# Patient Record
Sex: MALE | Race: WHITE | Employment: OTHER | URBAN - METROPOLITAN AREA
[De-identification: names, ages, dates, MRNs, and addresses within clinical notes are randomized per-mention and may not be internally consistent; named-entity substitution may affect disease eponyms.]

---

## 2017-01-03 RX ORDER — ATORVASTATIN CALCIUM 40 MG/1
TABLET, FILM COATED ORAL
Qty: 30 TABLET | Refills: 5 | Status: SHIPPED | OUTPATIENT
Start: 2017-01-03 | End: 2017-07-23 | Stop reason: SDUPTHER

## 2017-02-01 RX ORDER — CYCLOBENZAPRINE HCL 10 MG
TABLET ORAL
Qty: 30 TABLET | Refills: 0 | Status: SHIPPED | OUTPATIENT
Start: 2017-02-01 | End: 2017-03-22 | Stop reason: SDUPTHER

## 2017-02-06 ENCOUNTER — HOSPITAL ENCOUNTER (OUTPATIENT)
Dept: OTHER | Age: 62
Discharge: HOME | End: 2017-02-06
Attending: CHIROPRACTOR | Admitting: CHIROPRACTOR

## 2017-02-06 DIAGNOSIS — R51.9 GENERALIZED HEADACHES: ICD-10-CM

## 2017-03-18 ASSESSMENT — ENCOUNTER SYMPTOMS
SHORTNESS OF BREATH: 0
ABDOMINAL PAIN: 0

## 2017-03-22 ENCOUNTER — OFFICE VISIT (OUTPATIENT)
Dept: INTERNAL MEDICINE CLINIC | Age: 62
End: 2017-03-22

## 2017-03-22 VITALS
HEIGHT: 70 IN | BODY MASS INDEX: 23.62 KG/M2 | RESPIRATION RATE: 16 BRPM | SYSTOLIC BLOOD PRESSURE: 132 MMHG | DIASTOLIC BLOOD PRESSURE: 80 MMHG | WEIGHT: 165 LBS | HEART RATE: 64 BPM

## 2017-03-22 DIAGNOSIS — E78.00 PURE HYPERCHOLESTEROLEMIA: Primary | ICD-10-CM

## 2017-03-22 DIAGNOSIS — M54.16 LUMBAR RADICULOPATHY: ICD-10-CM

## 2017-03-22 DIAGNOSIS — K57.30 DIVERTICULOSIS OF LARGE INTESTINE WITHOUT HEMORRHAGE: ICD-10-CM

## 2017-03-22 PROCEDURE — 99213 OFFICE O/P EST LOW 20 MIN: CPT | Performed by: INTERNAL MEDICINE

## 2017-03-22 RX ORDER — CYCLOBENZAPRINE HCL 10 MG
TABLET ORAL
Qty: 30 TABLET | Refills: 3 | Status: SHIPPED | OUTPATIENT
Start: 2017-03-22 | End: 2017-07-23 | Stop reason: SDUPTHER

## 2017-03-22 RX ORDER — RIZATRIPTAN BENZOATE 10 MG/1
TABLET ORAL
Qty: 30 TABLET | Refills: 3 | Status: SHIPPED | OUTPATIENT
Start: 2017-03-22 | End: 2017-11-29 | Stop reason: SDUPTHER

## 2017-03-28 ENCOUNTER — OFFICE VISIT (OUTPATIENT)
Dept: INTERNAL MEDICINE CLINIC | Age: 62
End: 2017-03-28

## 2017-03-28 VITALS
HEART RATE: 96 BPM | HEIGHT: 70 IN | DIASTOLIC BLOOD PRESSURE: 78 MMHG | RESPIRATION RATE: 16 BRPM | WEIGHT: 167.2 LBS | BODY MASS INDEX: 23.94 KG/M2 | SYSTOLIC BLOOD PRESSURE: 138 MMHG

## 2017-03-28 DIAGNOSIS — H61.21 IMPACTED CERUMEN OF RIGHT EAR: Primary | ICD-10-CM

## 2017-03-28 PROCEDURE — 69210 REMOVE IMPACTED EAR WAX UNI: CPT | Performed by: INTERNAL MEDICINE

## 2017-03-28 PROCEDURE — 99999 PR OFFICE/OUTPT VISIT,PROCEDURE ONLY: CPT | Performed by: INTERNAL MEDICINE

## 2017-06-08 DIAGNOSIS — Z13.9 SCREENING: Primary | ICD-10-CM

## 2017-06-08 LAB
A/G RATIO: 1.8 (ref 1.1–2.2)
ALBUMIN SERPL-MCNC: 4.3 G/DL (ref 3.4–5)
ALP BLD-CCNC: 80 U/L (ref 40–129)
ALT SERPL-CCNC: 31 U/L (ref 10–40)
ANION GAP SERPL CALCULATED.3IONS-SCNC: 13 MMOL/L (ref 3–16)
AST SERPL-CCNC: 30 U/L (ref 15–37)
BILIRUB SERPL-MCNC: <0.2 MG/DL (ref 0–1)
BUN BLDV-MCNC: 25 MG/DL (ref 7–20)
CALCIUM SERPL-MCNC: 9.8 MG/DL (ref 8.3–10.6)
CHLORIDE BLD-SCNC: 104 MMOL/L (ref 99–110)
CHOLESTEROL, TOTAL: 147 MG/DL (ref 0–199)
CO2: 28 MMOL/L (ref 21–32)
CREAT SERPL-MCNC: 1.2 MG/DL (ref 0.8–1.3)
GFR AFRICAN AMERICAN: >60
GFR NON-AFRICAN AMERICAN: >60
GLOBULIN: 2.4 G/DL
GLUCOSE BLD-MCNC: 90 MG/DL (ref 70–99)
HDLC SERPL-MCNC: 53 MG/DL (ref 40–60)
LDL CHOLESTEROL CALCULATED: 75 MG/DL
POTASSIUM SERPL-SCNC: 4.9 MMOL/L (ref 3.5–5.1)
PROSTATE SPECIFIC ANTIGEN: 1.84 NG/ML (ref 0–4)
SODIUM BLD-SCNC: 145 MMOL/L (ref 136–145)
TOTAL PROTEIN: 6.7 G/DL (ref 6.4–8.2)
TRIGL SERPL-MCNC: 94 MG/DL (ref 0–150)
VLDLC SERPL CALC-MCNC: 19 MG/DL

## 2017-06-12 ASSESSMENT — ENCOUNTER SYMPTOMS
SHORTNESS OF BREATH: 0
ABDOMINAL PAIN: 0

## 2017-06-14 ENCOUNTER — OFFICE VISIT (OUTPATIENT)
Dept: INTERNAL MEDICINE CLINIC | Age: 62
End: 2017-06-14

## 2017-06-14 VITALS
DIASTOLIC BLOOD PRESSURE: 80 MMHG | WEIGHT: 162 LBS | HEIGHT: 70 IN | BODY MASS INDEX: 23.19 KG/M2 | RESPIRATION RATE: 16 BRPM | HEART RATE: 72 BPM | SYSTOLIC BLOOD PRESSURE: 130 MMHG

## 2017-06-14 DIAGNOSIS — Z00.00 PREVENTATIVE HEALTH CARE: Primary | ICD-10-CM

## 2017-06-14 DIAGNOSIS — E78.00 PURE HYPERCHOLESTEROLEMIA: ICD-10-CM

## 2017-06-14 DIAGNOSIS — K57.30 DIVERTICULOSIS OF LARGE INTESTINE WITHOUT HEMORRHAGE: ICD-10-CM

## 2017-06-14 DIAGNOSIS — M54.16 LUMBAR RADICULOPATHY: ICD-10-CM

## 2017-06-14 PROCEDURE — 99396 PREV VISIT EST AGE 40-64: CPT | Performed by: INTERNAL MEDICINE

## 2017-06-14 RX ORDER — IBUPROFEN 800 MG/1
TABLET ORAL
Qty: 90 TABLET | Refills: 3 | Status: SHIPPED | OUTPATIENT
Start: 2017-06-14 | End: 2018-07-24 | Stop reason: SDUPTHER

## 2017-06-14 RX ORDER — EZETIMIBE 10 MG/1
10 TABLET ORAL DAILY
COMMUNITY
End: 2017-06-14 | Stop reason: SDUPTHER

## 2017-06-14 RX ORDER — EZETIMIBE 10 MG/1
10 TABLET ORAL DAILY
Qty: 90 TABLET | Refills: 3 | Status: SHIPPED | OUTPATIENT
Start: 2017-06-14 | End: 2018-07-09 | Stop reason: SDUPTHER

## 2017-07-19 ENCOUNTER — OFFICE VISIT (OUTPATIENT)
Dept: INTERNAL MEDICINE CLINIC | Age: 62
End: 2017-07-19

## 2017-07-19 VITALS
WEIGHT: 161 LBS | SYSTOLIC BLOOD PRESSURE: 146 MMHG | HEIGHT: 70 IN | DIASTOLIC BLOOD PRESSURE: 92 MMHG | HEART RATE: 80 BPM | BODY MASS INDEX: 23.05 KG/M2 | RESPIRATION RATE: 16 BRPM

## 2017-07-19 DIAGNOSIS — M54.16 LUMBAR RADICULOPATHY: ICD-10-CM

## 2017-07-19 DIAGNOSIS — M25.569 ACUTE KNEE PAIN, UNSPECIFIED LATERALITY: ICD-10-CM

## 2017-07-19 DIAGNOSIS — E78.00 PURE HYPERCHOLESTEROLEMIA: Primary | ICD-10-CM

## 2017-07-19 PROCEDURE — 99213 OFFICE O/P EST LOW 20 MIN: CPT | Performed by: INTERNAL MEDICINE

## 2017-07-19 RX ORDER — PREDNISONE 10 MG/1
TABLET ORAL
Qty: 28 TABLET | Refills: 0 | Status: SHIPPED | OUTPATIENT
Start: 2017-07-19 | End: 2018-01-30 | Stop reason: SDUPTHER

## 2017-07-19 ASSESSMENT — ENCOUNTER SYMPTOMS
SHORTNESS OF BREATH: 0
ABDOMINAL PAIN: 0

## 2017-07-20 ENCOUNTER — HOSPITAL ENCOUNTER (OUTPATIENT)
Dept: OTHER | Age: 62
Discharge: HOME | End: 2017-07-20
Attending: INTERNAL MEDICINE | Admitting: INTERNAL MEDICINE

## 2017-07-20 DIAGNOSIS — M25.569 ACUTE KNEE PAIN, UNSPECIFIED LATERALITY: ICD-10-CM

## 2017-07-24 RX ORDER — ATORVASTATIN CALCIUM 40 MG/1
TABLET, FILM COATED ORAL
Qty: 30 TABLET | Refills: 0 | Status: SHIPPED | OUTPATIENT
Start: 2017-07-24 | End: 2017-08-24 | Stop reason: SDUPTHER

## 2017-07-24 RX ORDER — CYCLOBENZAPRINE HCL 10 MG
TABLET ORAL
Qty: 30 TABLET | Refills: 0 | Status: SHIPPED | OUTPATIENT
Start: 2017-07-24 | End: 2017-08-24 | Stop reason: SDUPTHER

## 2017-08-25 RX ORDER — CYCLOBENZAPRINE HCL 10 MG
TABLET ORAL
Qty: 30 TABLET | Refills: 0 | Status: SHIPPED | OUTPATIENT
Start: 2017-08-25 | End: 2017-09-16 | Stop reason: SDUPTHER

## 2017-08-25 RX ORDER — ATORVASTATIN CALCIUM 40 MG/1
TABLET, FILM COATED ORAL
Qty: 30 TABLET | Refills: 0 | Status: SHIPPED | OUTPATIENT
Start: 2017-08-25 | End: 2017-09-16 | Stop reason: SDUPTHER

## 2017-09-18 RX ORDER — CYCLOBENZAPRINE HCL 10 MG
TABLET ORAL
Qty: 30 TABLET | Refills: 0 | Status: SHIPPED | OUTPATIENT
Start: 2017-09-18 | End: 2017-10-26 | Stop reason: SDUPTHER

## 2017-09-18 RX ORDER — ATORVASTATIN CALCIUM 40 MG/1
TABLET, FILM COATED ORAL
Qty: 30 TABLET | Refills: 0 | Status: SHIPPED | OUTPATIENT
Start: 2017-09-18 | End: 2017-10-26 | Stop reason: SDUPTHER

## 2017-10-27 RX ORDER — ATORVASTATIN CALCIUM 40 MG/1
TABLET, FILM COATED ORAL
Qty: 30 TABLET | Refills: 0 | Status: SHIPPED | OUTPATIENT
Start: 2017-10-27 | End: 2017-11-21 | Stop reason: SDUPTHER

## 2017-10-27 RX ORDER — CYCLOBENZAPRINE HCL 10 MG
TABLET ORAL
Qty: 30 TABLET | Refills: 0 | Status: SHIPPED | OUTPATIENT
Start: 2017-10-27 | End: 2017-11-29 | Stop reason: SDUPTHER

## 2017-11-21 DIAGNOSIS — Z00.00 PREVENTATIVE HEALTH CARE: ICD-10-CM

## 2017-11-21 DIAGNOSIS — E78.00 PURE HYPERCHOLESTEROLEMIA: ICD-10-CM

## 2017-11-21 LAB
A/G RATIO: 2 (ref 1.1–2.2)
ALBUMIN SERPL-MCNC: 4.2 G/DL (ref 3.4–5)
ALP BLD-CCNC: 86 U/L (ref 40–129)
ALT SERPL-CCNC: 39 U/L (ref 10–40)
ANION GAP SERPL CALCULATED.3IONS-SCNC: 13 MMOL/L (ref 3–16)
AST SERPL-CCNC: 42 U/L (ref 15–37)
BILIRUB SERPL-MCNC: <0.2 MG/DL (ref 0–1)
BUN BLDV-MCNC: 22 MG/DL (ref 7–20)
CALCIUM SERPL-MCNC: 9.8 MG/DL (ref 8.3–10.6)
CHLORIDE BLD-SCNC: 102 MMOL/L (ref 99–110)
CHOLESTEROL, TOTAL: 147 MG/DL (ref 0–199)
CO2: 26 MMOL/L (ref 21–32)
CREAT SERPL-MCNC: 1.2 MG/DL (ref 0.8–1.3)
GFR AFRICAN AMERICAN: >60
GFR NON-AFRICAN AMERICAN: >60
GLOBULIN: 2.1 G/DL
GLUCOSE BLD-MCNC: 91 MG/DL (ref 70–99)
HDLC SERPL-MCNC: 70 MG/DL (ref 40–60)
HEPATITIS C ANTIBODY INTERPRETATION: NORMAL
LDL CHOLESTEROL CALCULATED: 65 MG/DL
POTASSIUM SERPL-SCNC: 4.4 MMOL/L (ref 3.5–5.1)
SODIUM BLD-SCNC: 141 MMOL/L (ref 136–145)
TOTAL PROTEIN: 6.3 G/DL (ref 6.4–8.2)
TRIGL SERPL-MCNC: 61 MG/DL (ref 0–150)
VLDLC SERPL CALC-MCNC: 12 MG/DL

## 2017-11-21 RX ORDER — ATORVASTATIN CALCIUM 40 MG/1
TABLET, FILM COATED ORAL
Qty: 30 TABLET | Refills: 0 | Status: SHIPPED | OUTPATIENT
Start: 2017-11-21 | End: 2017-12-26 | Stop reason: SDUPTHER

## 2017-11-22 LAB — HIV-1 AND HIV-2 ANTIBODIES: NORMAL

## 2017-11-27 ASSESSMENT — ENCOUNTER SYMPTOMS
SHORTNESS OF BREATH: 0
ABDOMINAL PAIN: 0

## 2017-11-29 ENCOUNTER — OFFICE VISIT (OUTPATIENT)
Dept: INTERNAL MEDICINE CLINIC | Age: 62
End: 2017-11-29

## 2017-11-29 VITALS
RESPIRATION RATE: 16 BRPM | WEIGHT: 164 LBS | BODY MASS INDEX: 23.48 KG/M2 | HEIGHT: 70 IN | SYSTOLIC BLOOD PRESSURE: 132 MMHG | HEART RATE: 80 BPM | DIASTOLIC BLOOD PRESSURE: 84 MMHG

## 2017-11-29 DIAGNOSIS — G44.221 CHRONIC TENSION-TYPE HEADACHE, INTRACTABLE: ICD-10-CM

## 2017-11-29 DIAGNOSIS — E78.00 PURE HYPERCHOLESTEROLEMIA: Primary | ICD-10-CM

## 2017-11-29 DIAGNOSIS — N40.0 BENIGN PROSTATIC HYPERPLASIA WITHOUT LOWER URINARY TRACT SYMPTOMS: ICD-10-CM

## 2017-11-29 DIAGNOSIS — Z23 NEED FOR INFLUENZA VACCINATION: ICD-10-CM

## 2017-11-29 DIAGNOSIS — M50.20 HERNIATED CERVICAL DISC: ICD-10-CM

## 2017-11-29 PROCEDURE — 90686 IIV4 VACC NO PRSV 0.5 ML IM: CPT | Performed by: INTERNAL MEDICINE

## 2017-11-29 PROCEDURE — 90471 IMMUNIZATION ADMIN: CPT | Performed by: INTERNAL MEDICINE

## 2017-11-29 PROCEDURE — G8484 FLU IMMUNIZE NO ADMIN: HCPCS | Performed by: INTERNAL MEDICINE

## 2017-11-29 PROCEDURE — 1036F TOBACCO NON-USER: CPT | Performed by: INTERNAL MEDICINE

## 2017-11-29 PROCEDURE — 99214 OFFICE O/P EST MOD 30 MIN: CPT | Performed by: INTERNAL MEDICINE

## 2017-11-29 PROCEDURE — G8420 CALC BMI NORM PARAMETERS: HCPCS | Performed by: INTERNAL MEDICINE

## 2017-11-29 PROCEDURE — G8428 CUR MEDS NOT DOCUMENT: HCPCS | Performed by: INTERNAL MEDICINE

## 2017-11-29 PROCEDURE — 3017F COLORECTAL CA SCREEN DOC REV: CPT | Performed by: INTERNAL MEDICINE

## 2017-11-29 RX ORDER — CYCLOBENZAPRINE HCL 10 MG
TABLET ORAL
Qty: 30 TABLET | Refills: 5 | Status: SHIPPED | OUTPATIENT
Start: 2017-11-29 | End: 2018-06-10 | Stop reason: SDUPTHER

## 2017-11-29 RX ORDER — RIZATRIPTAN BENZOATE 10 MG/1
TABLET ORAL
Qty: 30 TABLET | Refills: 5 | Status: SHIPPED | OUTPATIENT
Start: 2017-11-29 | End: 2018-02-26

## 2017-12-26 RX ORDER — ATORVASTATIN CALCIUM 40 MG/1
TABLET, FILM COATED ORAL
Qty: 30 TABLET | Refills: 5 | Status: SHIPPED | OUTPATIENT
Start: 2017-12-26 | End: 2018-07-09 | Stop reason: SDUPTHER

## 2018-01-29 PROBLEM — M79.645 THUMB PAIN, LEFT: Status: ACTIVE | Noted: 2018-01-29

## 2018-01-29 PROBLEM — M25.561 ACUTE PAIN OF RIGHT KNEE: Status: ACTIVE | Noted: 2018-01-29

## 2018-01-29 ASSESSMENT — ENCOUNTER SYMPTOMS
ABDOMINAL PAIN: 0
SHORTNESS OF BREATH: 0

## 2018-01-29 NOTE — PROGRESS NOTES
Subjective:      Patient ID: Maynor Astorga is a 65 y.o. male. HPI  1. Pure hypercholesterolemia --Stable--no new issues----lab noted and reviewed      2. Acute pain of right knee --incr sx --pain--last rx pred--did help-- 07/2017--[facility] sx--no trauma     3. Thumb pain, left --base of thumb --ligament full and sl fluid --cystic         Review of Systems   Respiratory: Negative for shortness of breath. Cardiovascular: Negative for chest pain. Gastrointestinal: Negative for abdominal pain. Objective:   Physical Exam   Cardiovascular: Normal rate. Pulmonary/Chest: Effort normal.   Abdominal: Soft. Musculoskeletal:   Rt knee--med pain at jt line--sl decr flex   No effusion   Lt thumb--palp cyst  at pip jt--sl tender        Assessment:      1. Pure hypercholesterolemia --Continue current therapy      2. Acute pain of right knee --pred tpaer--see ortho if no better     3.  Thumb pain, left --pred tpaer---hand orto for asp if no better             Plan:

## 2018-01-30 ENCOUNTER — OFFICE VISIT (OUTPATIENT)
Dept: INTERNAL MEDICINE CLINIC | Age: 63
End: 2018-01-30

## 2018-01-30 VITALS
SYSTOLIC BLOOD PRESSURE: 138 MMHG | HEART RATE: 72 BPM | HEIGHT: 70 IN | WEIGHT: 171 LBS | DIASTOLIC BLOOD PRESSURE: 82 MMHG | BODY MASS INDEX: 24.48 KG/M2 | RESPIRATION RATE: 16 BRPM

## 2018-01-30 DIAGNOSIS — M25.561 ACUTE PAIN OF RIGHT KNEE: ICD-10-CM

## 2018-01-30 DIAGNOSIS — M79.645 THUMB PAIN, LEFT: ICD-10-CM

## 2018-01-30 DIAGNOSIS — E78.00 PURE HYPERCHOLESTEROLEMIA: Primary | ICD-10-CM

## 2018-01-30 PROCEDURE — 99213 OFFICE O/P EST LOW 20 MIN: CPT | Performed by: INTERNAL MEDICINE

## 2018-01-30 PROCEDURE — G8484 FLU IMMUNIZE NO ADMIN: HCPCS | Performed by: INTERNAL MEDICINE

## 2018-01-30 PROCEDURE — 1036F TOBACCO NON-USER: CPT | Performed by: INTERNAL MEDICINE

## 2018-01-30 PROCEDURE — G8427 DOCREV CUR MEDS BY ELIG CLIN: HCPCS | Performed by: INTERNAL MEDICINE

## 2018-01-30 PROCEDURE — G8420 CALC BMI NORM PARAMETERS: HCPCS | Performed by: INTERNAL MEDICINE

## 2018-01-30 PROCEDURE — 3017F COLORECTAL CA SCREEN DOC REV: CPT | Performed by: INTERNAL MEDICINE

## 2018-01-30 RX ORDER — PREDNISONE 10 MG/1
TABLET ORAL
Qty: 28 TABLET | Refills: 0 | Status: SHIPPED | OUTPATIENT
Start: 2018-01-30 | End: 2018-09-05 | Stop reason: ALTCHOICE

## 2018-02-26 RX ORDER — RIZATRIPTAN BENZOATE 10 MG/1
TABLET ORAL
Qty: 30 TABLET | Refills: 0 | Status: SHIPPED | OUTPATIENT
Start: 2018-02-26 | End: 2018-05-17 | Stop reason: SDUPTHER

## 2018-05-17 RX ORDER — RIZATRIPTAN BENZOATE 10 MG/1
TABLET ORAL
Qty: 30 TABLET | Refills: 0 | Status: SHIPPED | OUTPATIENT
Start: 2018-05-17 | End: 2018-07-24 | Stop reason: SDUPTHER

## 2018-05-21 DIAGNOSIS — E78.00 PURE HYPERCHOLESTEROLEMIA: ICD-10-CM

## 2018-05-21 DIAGNOSIS — N40.0 BENIGN PROSTATIC HYPERPLASIA WITHOUT LOWER URINARY TRACT SYMPTOMS: ICD-10-CM

## 2018-05-21 LAB
A/G RATIO: 2.3 (ref 1.1–2.2)
ALBUMIN SERPL-MCNC: 4.3 G/DL (ref 3.4–5)
ALP BLD-CCNC: 84 U/L (ref 40–129)
ALT SERPL-CCNC: 23 U/L (ref 10–40)
ANION GAP SERPL CALCULATED.3IONS-SCNC: 16 MMOL/L (ref 3–16)
AST SERPL-CCNC: 35 U/L (ref 15–37)
BILIRUB SERPL-MCNC: 0.4 MG/DL (ref 0–1)
BUN BLDV-MCNC: 30 MG/DL (ref 7–20)
CALCIUM SERPL-MCNC: 9.7 MG/DL (ref 8.3–10.6)
CHLORIDE BLD-SCNC: 102 MMOL/L (ref 99–110)
CHOLESTEROL, TOTAL: 153 MG/DL (ref 0–199)
CO2: 23 MMOL/L (ref 21–32)
CREAT SERPL-MCNC: 1.5 MG/DL (ref 0.8–1.3)
GFR AFRICAN AMERICAN: 57
GFR NON-AFRICAN AMERICAN: 47
GLOBULIN: 1.9 G/DL
GLUCOSE BLD-MCNC: 102 MG/DL (ref 70–99)
HDLC SERPL-MCNC: 57 MG/DL (ref 40–60)
LDL CHOLESTEROL CALCULATED: 82 MG/DL
POTASSIUM SERPL-SCNC: 4.2 MMOL/L (ref 3.5–5.1)
PROSTATE SPECIFIC ANTIGEN: 3.19 NG/ML (ref 0–4)
SODIUM BLD-SCNC: 141 MMOL/L (ref 136–145)
TOTAL PROTEIN: 6.2 G/DL (ref 6.4–8.2)
TRIGL SERPL-MCNC: 70 MG/DL (ref 0–150)
VLDLC SERPL CALC-MCNC: 14 MG/DL

## 2018-06-11 RX ORDER — CYCLOBENZAPRINE HCL 10 MG
TABLET ORAL
Qty: 30 TABLET | Refills: 0 | Status: SHIPPED | OUTPATIENT
Start: 2018-06-11 | End: 2018-07-09 | Stop reason: SDUPTHER

## 2018-06-12 ASSESSMENT — ENCOUNTER SYMPTOMS
SHORTNESS OF BREATH: 0
ABDOMINAL PAIN: 0

## 2018-06-15 ENCOUNTER — OFFICE VISIT (OUTPATIENT)
Dept: INTERNAL MEDICINE CLINIC | Age: 63
End: 2018-06-15

## 2018-06-15 VITALS
DIASTOLIC BLOOD PRESSURE: 78 MMHG | RESPIRATION RATE: 16 BRPM | HEART RATE: 84 BPM | BODY MASS INDEX: 23.96 KG/M2 | WEIGHT: 167 LBS | SYSTOLIC BLOOD PRESSURE: 122 MMHG | OXYGEN SATURATION: 97 %

## 2018-06-15 DIAGNOSIS — M54.16 LUMBAR RADICULOPATHY: ICD-10-CM

## 2018-06-15 DIAGNOSIS — E78.00 PURE HYPERCHOLESTEROLEMIA: Primary | ICD-10-CM

## 2018-06-15 DIAGNOSIS — N28.9 RENAL INSUFFICIENCY: ICD-10-CM

## 2018-06-15 DIAGNOSIS — M54.12 CERVICAL RADICULOPATHY: ICD-10-CM

## 2018-06-15 LAB
ANION GAP SERPL CALCULATED.3IONS-SCNC: 14 MMOL/L (ref 3–16)
BUN BLDV-MCNC: 25 MG/DL (ref 7–20)
CALCIUM SERPL-MCNC: 10.2 MG/DL (ref 8.3–10.6)
CHLORIDE BLD-SCNC: 103 MMOL/L (ref 99–110)
CO2: 26 MMOL/L (ref 21–32)
CREAT SERPL-MCNC: 1.2 MG/DL (ref 0.8–1.3)
GFR AFRICAN AMERICAN: >60
GFR NON-AFRICAN AMERICAN: >60
GLUCOSE BLD-MCNC: 93 MG/DL (ref 70–99)
POTASSIUM SERPL-SCNC: 5 MMOL/L (ref 3.5–5.1)
SODIUM BLD-SCNC: 143 MMOL/L (ref 136–145)

## 2018-06-15 PROCEDURE — 99396 PREV VISIT EST AGE 40-64: CPT | Performed by: INTERNAL MEDICINE

## 2018-07-09 RX ORDER — EZETIMIBE 10 MG/1
10 TABLET ORAL DAILY
Qty: 90 TABLET | Refills: 0 | Status: SHIPPED | OUTPATIENT
Start: 2018-07-09 | End: 2018-10-22

## 2018-07-09 RX ORDER — CYCLOBENZAPRINE HCL 10 MG
TABLET ORAL
Qty: 30 TABLET | Refills: 0 | Status: SHIPPED | OUTPATIENT
Start: 2018-07-09 | End: 2018-08-11 | Stop reason: SDUPTHER

## 2018-07-09 RX ORDER — ATORVASTATIN CALCIUM 40 MG/1
TABLET, FILM COATED ORAL
Qty: 30 TABLET | Refills: 0 | Status: SHIPPED | OUTPATIENT
Start: 2018-07-09 | End: 2018-08-11 | Stop reason: SDUPTHER

## 2018-07-25 RX ORDER — IBUPROFEN 800 MG/1
TABLET ORAL
Qty: 90 TABLET | Refills: 0 | Status: SHIPPED | OUTPATIENT
Start: 2018-07-25 | End: 2018-12-03 | Stop reason: SDUPTHER

## 2018-07-25 RX ORDER — RIZATRIPTAN BENZOATE 10 MG/1
TABLET ORAL
Qty: 30 TABLET | Refills: 0 | Status: SHIPPED | OUTPATIENT
Start: 2018-07-25 | End: 2018-09-21 | Stop reason: SDUPTHER

## 2018-08-13 RX ORDER — ATORVASTATIN CALCIUM 40 MG/1
TABLET, FILM COATED ORAL
Qty: 30 TABLET | Refills: 0 | Status: SHIPPED | OUTPATIENT
Start: 2018-08-13 | End: 2018-09-14 | Stop reason: SDUPTHER

## 2018-08-13 RX ORDER — CYCLOBENZAPRINE HCL 10 MG
TABLET ORAL
Qty: 30 TABLET | Refills: 0 | Status: SHIPPED | OUTPATIENT
Start: 2018-08-13 | End: 2018-09-14 | Stop reason: SDUPTHER

## 2018-09-05 ENCOUNTER — OFFICE VISIT (OUTPATIENT)
Dept: DERMATOLOGY | Age: 63
End: 2018-09-05

## 2018-09-05 DIAGNOSIS — L82.1 SEBORRHEIC KERATOSES: ICD-10-CM

## 2018-09-05 DIAGNOSIS — D48.5 NEOPLASM OF UNCERTAIN BEHAVIOR OF SKIN: Primary | ICD-10-CM

## 2018-09-05 PROCEDURE — 11100 PR BIOPSY OF SKIN LESION: CPT | Performed by: DERMATOLOGY

## 2018-09-05 PROCEDURE — 1036F TOBACCO NON-USER: CPT | Performed by: DERMATOLOGY

## 2018-09-05 PROCEDURE — G8427 DOCREV CUR MEDS BY ELIG CLIN: HCPCS | Performed by: DERMATOLOGY

## 2018-09-05 PROCEDURE — G8420 CALC BMI NORM PARAMETERS: HCPCS | Performed by: DERMATOLOGY

## 2018-09-05 PROCEDURE — 99202 OFFICE O/P NEW SF 15 MIN: CPT | Performed by: DERMATOLOGY

## 2018-09-05 PROCEDURE — 3017F COLORECTAL CA SCREEN DOC REV: CPT | Performed by: DERMATOLOGY

## 2018-09-05 NOTE — PROGRESS NOTES
Addendum: Excoriated pink papule left buttock present for months. Has had similar lesions last for years. Very pruritic and patient scratching constantly. Physical exam-raised erythematous papule with intact skin markings, prurigo nodule left buttock    Assessment and plan: Sample of clobetasol cream to be used b.i.d. under occlusion. Reviewed proper use and side effects.

## 2018-09-05 NOTE — PROGRESS NOTES
[facility]) Dermatology  Toni Dia M.D.  [phone]       Jose Garcia  1955    65 y.o. male     Date of Visit: 9/5/2018    Chief Complaint:   Chief Complaint   Patient presents with   [address]     NP here today to establish care    Skin Lesion     lesion on back increasing in size        I was asked to see this patient by Dr. Porras ref. provider found. History of Present Illness:  1. Total body skin exam-wife is a patient    New lesion left lower back present 6-9 months. Was initially pruritic. Enlarged and has now reduced in size. Currently asymptomatic. Not previously treated. No prior personal history of skin cancer. Father with a history of actinic keratoses. Does wear a hat regularly. Review of Systems:  Constitutional: Reports general sense of well-being       Past Medical History, Surgical History, Family History, Medications and Allergies reviewed. Social History:   Social History     Social History    Marital status:      Spouse name: N/A    Number of children: N/A    Years of education: N/A     Occupational History    Not on file. Social History Main Topics    Smoking status: Never Smoker    Smokeless tobacco: Never Used    Alcohol use Yes    Drug use: No    Sexual activity: Yes     Partners: Female     Other Topics Concern    Not on file     Social History Narrative    No narrative on file       Physical Examination       -General: Well-appearing, NAD  1. Normal affect. Total body skin exam including scalp, face, neck, chest, abdomen, back, bilateral upper extremities, bilateral lower extremities, ocular conjunctiva, external lips, and nails was performed. Examination normal unless stated below. Underwear area not examined. Scattered on the trunk and extremities are multiple well-defined round and oval symmetric smoothly-bordered uniformly brown macules and papules.   Hyperkeratotic stuck on papule central chest and back  Left lower back 8 mm pink papule-rule out nonmelanoma skin cancer          Assessment and Plan     1. Neoplasm of uncertain behavior of skin -Left lower back--Discussed possible diagnosis. Patient agreeable to biopsy. Verbal consent obtained after risks (infection, bleeding, scar), benefits and alternatives explained. -Area(s) to be biopsied were marked with a surgical pen. Site(s) were cleansed with alcohol. Local anesthesia achieved with 1% lidocaine with epinephrine/sodium bicarbonate. Shave biopsy performed with a razor blade. Hemostasis was achieved with aluminum chloride. The wound(s) were dressed with petrolatum and covered with a bandage. Specimen(s) sent to pathology. Pt educated re: risk of bleeding, infection, scar and wound care instructions.      2. Seborrheic keratoses -Monitor for change

## 2018-09-10 LAB — DERMATOLOGY PATHOLOGY REPORT: NORMAL

## 2018-09-13 RX ORDER — CLOBETASOL PROPIONATE 0.5 MG/G
OINTMENT TOPICAL
Qty: 15 G | Refills: 2 | Status: SHIPPED | OUTPATIENT
Start: 2018-09-13

## 2018-09-13 NOTE — TELEPHONE ENCOUNTER
clobetasol (TEMOVATE) 0.05 % ointment [[id number]]   Order Details   Dose, Route, Frequency: As Directed    Dispense Quantity:  15 g Refills:  2 Fills remaining:  --           Sig: Apply to affected area BID PRN flares               Spoke with patient to notify that prescription had been sent in. Patient stated understanding.

## 2018-09-16 RX ORDER — ATORVASTATIN CALCIUM 40 MG/1
TABLET, FILM COATED ORAL
Qty: 30 TABLET | Refills: 0 | Status: SHIPPED | OUTPATIENT
Start: 2018-09-16 | End: 2018-10-23 | Stop reason: SDUPTHER

## 2018-09-16 RX ORDER — CYCLOBENZAPRINE HCL 10 MG
TABLET ORAL
Qty: 30 TABLET | Refills: 0 | Status: SHIPPED | OUTPATIENT
Start: 2018-09-16 | End: 2018-10-21 | Stop reason: SDUPTHER

## 2018-09-21 RX ORDER — RIZATRIPTAN BENZOATE 10 MG/1
TABLET ORAL
Qty: 30 TABLET | Refills: 0 | Status: SHIPPED | OUTPATIENT
Start: 2018-09-21 | End: 2018-11-30 | Stop reason: SDUPTHER

## 2018-10-16 PROBLEM — M54.9 BACK PAIN: Status: ACTIVE | Noted: 2018-10-16

## 2018-10-22 RX ORDER — CYCLOBENZAPRINE HCL 10 MG
TABLET ORAL
Qty: 30 TABLET | Refills: 0 | Status: SHIPPED | OUTPATIENT
Start: 2018-10-22 | End: 2018-11-19 | Stop reason: SDUPTHER

## 2018-10-22 RX ORDER — EZETIMIBE 10 MG/1
10 TABLET ORAL DAILY
Qty: 90 TABLET | Refills: 0 | Status: SHIPPED | OUTPATIENT
Start: 2018-10-22 | End: 2019-01-23

## 2018-10-24 RX ORDER — ATORVASTATIN CALCIUM 40 MG/1
TABLET, FILM COATED ORAL
Qty: 30 TABLET | Refills: 0 | Status: SHIPPED | OUTPATIENT
Start: 2018-10-24 | End: 2018-11-19 | Stop reason: SDUPTHER

## 2018-11-19 RX ORDER — ATORVASTATIN CALCIUM 40 MG/1
TABLET, FILM COATED ORAL
Qty: 30 TABLET | Refills: 0 | Status: SHIPPED | OUTPATIENT
Start: 2018-11-19 | End: 2018-12-23 | Stop reason: SDUPTHER

## 2018-11-19 RX ORDER — CYCLOBENZAPRINE HCL 10 MG
TABLET ORAL
Qty: 30 TABLET | Refills: 0 | Status: SHIPPED | OUTPATIENT
Start: 2018-11-19 | End: 2019-02-25 | Stop reason: SDUPTHER

## 2018-12-01 RX ORDER — RIZATRIPTAN BENZOATE 10 MG/1
TABLET ORAL
Qty: 30 TABLET | Refills: 0 | Status: SHIPPED | OUTPATIENT
Start: 2018-12-01 | End: 2019-01-23 | Stop reason: SDUPTHER

## 2018-12-03 RX ORDER — IBUPROFEN 800 MG/1
TABLET ORAL
Qty: 90 TABLET | Refills: 0 | Status: SHIPPED | OUTPATIENT
Start: 2018-12-03 | End: 2019-05-04 | Stop reason: SDUPTHER

## 2018-12-24 RX ORDER — ATORVASTATIN CALCIUM 40 MG/1
TABLET, FILM COATED ORAL
Qty: 30 TABLET | Refills: 0 | Status: SHIPPED | OUTPATIENT
Start: 2018-12-24 | End: 2019-01-23 | Stop reason: SDUPTHER

## 2019-01-04 ENCOUNTER — TELEPHONE (OUTPATIENT)
Dept: FAMILY MEDICINE CLINIC | Age: 64
End: 2019-01-04

## 2019-01-23 RX ORDER — EZETIMIBE 10 MG/1
10 TABLET ORAL DAILY
Qty: 90 TABLET | Refills: 0 | Status: SHIPPED | OUTPATIENT
Start: 2019-01-23 | End: 2019-08-20

## 2019-01-23 RX ORDER — ATORVASTATIN CALCIUM 40 MG/1
TABLET, FILM COATED ORAL
Qty: 30 TABLET | Refills: 0 | Status: SHIPPED | OUTPATIENT
Start: 2019-01-23 | End: 2019-02-23 | Stop reason: SDUPTHER

## 2019-01-23 RX ORDER — RIZATRIPTAN BENZOATE 10 MG/1
TABLET ORAL
Qty: 30 TABLET | Refills: 0 | Status: SHIPPED | OUTPATIENT
Start: 2019-01-23 | End: 2019-05-14 | Stop reason: SDUPTHER

## 2019-02-25 RX ORDER — CYCLOBENZAPRINE HCL 10 MG
TABLET ORAL
Qty: 30 TABLET | Refills: 0 | Status: SHIPPED | OUTPATIENT
Start: 2019-02-25 | End: 2019-04-01 | Stop reason: SDUPTHER

## 2019-02-25 RX ORDER — ATORVASTATIN CALCIUM 40 MG/1
TABLET, FILM COATED ORAL
Qty: 30 TABLET | Refills: 0 | Status: SHIPPED | OUTPATIENT
Start: 2019-02-25 | End: 2019-04-01 | Stop reason: SDUPTHER

## 2019-04-02 RX ORDER — CYCLOBENZAPRINE HCL 10 MG
TABLET ORAL
Qty: 30 TABLET | Refills: 0 | Status: SHIPPED | OUTPATIENT
Start: 2019-04-02 | End: 2019-05-04 | Stop reason: SDUPTHER

## 2019-04-02 RX ORDER — ATORVASTATIN CALCIUM 40 MG/1
TABLET, FILM COATED ORAL
Qty: 30 TABLET | Refills: 0 | Status: SHIPPED | OUTPATIENT
Start: 2019-04-02 | End: 2019-05-04 | Stop reason: SDUPTHER

## 2019-05-06 RX ORDER — IBUPROFEN 800 MG/1
TABLET ORAL
Qty: 90 TABLET | Refills: 0 | Status: SHIPPED | OUTPATIENT
Start: 2019-05-06 | End: 2019-08-20 | Stop reason: SDUPTHER

## 2019-05-06 RX ORDER — ATORVASTATIN CALCIUM 40 MG/1
TABLET, FILM COATED ORAL
Qty: 30 TABLET | Refills: 0 | Status: SHIPPED | OUTPATIENT
Start: 2019-05-06 | End: 2019-05-31 | Stop reason: SDUPTHER

## 2019-05-06 RX ORDER — EZETIMIBE 10 MG/1
10 TABLET ORAL DAILY
Qty: 90 TABLET | Refills: 0 | Status: SHIPPED | OUTPATIENT
Start: 2019-05-06 | End: 2019-06-05 | Stop reason: CLARIF

## 2019-05-06 RX ORDER — CYCLOBENZAPRINE HCL 10 MG
TABLET ORAL
Qty: 30 TABLET | Refills: 0 | Status: SHIPPED | OUTPATIENT
Start: 2019-05-06 | End: 2019-06-05 | Stop reason: SDUPTHER

## 2019-05-14 RX ORDER — RIZATRIPTAN BENZOATE 10 MG/1
TABLET ORAL
Qty: 30 TABLET | Refills: 0 | Status: SHIPPED | OUTPATIENT
Start: 2019-05-14 | End: 2019-07-29 | Stop reason: SDUPTHER

## 2019-05-31 RX ORDER — ATORVASTATIN CALCIUM 40 MG/1
TABLET, FILM COATED ORAL
Qty: 30 TABLET | Refills: 0 | Status: SHIPPED | OUTPATIENT
Start: 2019-05-31 | End: 2019-07-10 | Stop reason: SDUPTHER

## 2019-07-29 RX ORDER — RIZATRIPTAN BENZOATE 10 MG/1
TABLET ORAL
Qty: 30 TABLET | Refills: 0 | Status: SHIPPED | OUTPATIENT
Start: 2019-07-29 | End: 2019-08-09 | Stop reason: SDUPTHER

## 2019-08-20 RX ORDER — EZETIMIBE 10 MG/1
10 TABLET ORAL DAILY
Qty: 90 TABLET | Refills: 0 | Status: SHIPPED | OUTPATIENT
Start: 2019-08-20 | End: 2019-12-09

## 2019-08-20 RX ORDER — ATORVASTATIN CALCIUM 40 MG/1
TABLET, FILM COATED ORAL
Qty: 30 TABLET | Refills: 0 | Status: SHIPPED | OUTPATIENT
Start: 2019-08-20 | End: 2019-09-25 | Stop reason: SDUPTHER

## 2019-08-20 RX ORDER — IBUPROFEN 800 MG/1
TABLET ORAL
Qty: 90 TABLET | Refills: 0 | Status: SHIPPED | OUTPATIENT
Start: 2019-08-20 | End: 2019-12-08 | Stop reason: SDUPTHER

## 2019-09-25 RX ORDER — ATORVASTATIN CALCIUM 40 MG/1
TABLET, FILM COATED ORAL
Qty: 30 TABLET | Refills: 0 | Status: SHIPPED | OUTPATIENT
Start: 2019-09-25 | End: 2019-10-30 | Stop reason: SDUPTHER

## 2019-10-30 RX ORDER — ATORVASTATIN CALCIUM 40 MG/1
TABLET, FILM COATED ORAL
Qty: 30 TABLET | Refills: 0 | Status: SHIPPED | OUTPATIENT
Start: 2019-10-30 | End: 2019-12-08 | Stop reason: SDUPTHER

## 2019-12-09 RX ORDER — IBUPROFEN 800 MG/1
TABLET ORAL
Qty: 90 TABLET | Refills: 0 | Status: SHIPPED | OUTPATIENT
Start: 2019-12-09 | End: 2020-03-10

## 2019-12-09 RX ORDER — EZETIMIBE 10 MG/1
10 TABLET ORAL DAILY
Qty: 90 TABLET | Refills: 0 | Status: SHIPPED | OUTPATIENT
Start: 2019-12-09 | End: 2020-03-10

## 2019-12-09 RX ORDER — ATORVASTATIN CALCIUM 40 MG/1
TABLET, FILM COATED ORAL
Qty: 30 TABLET | Refills: 0 | Status: SHIPPED | OUTPATIENT
Start: 2019-12-09 | End: 2020-01-10

## 2020-01-10 RX ORDER — ATORVASTATIN CALCIUM 40 MG/1
TABLET, FILM COATED ORAL
Qty: 30 TABLET | Refills: 0 | Status: SHIPPED | OUTPATIENT
Start: 2020-01-10 | End: 2020-02-10

## 2020-02-10 RX ORDER — ATORVASTATIN CALCIUM 40 MG/1
TABLET, FILM COATED ORAL
Qty: 30 TABLET | Refills: 0 | Status: SHIPPED | OUTPATIENT
Start: 2020-02-10 | End: 2020-03-10

## 2020-03-10 RX ORDER — EZETIMIBE 10 MG/1
10 TABLET ORAL DAILY
Qty: 90 TABLET | Refills: 0 | Status: SHIPPED | OUTPATIENT
Start: 2020-03-10 | End: 2020-06-03

## 2020-06-03 RX ORDER — EZETIMIBE 10 MG/1
10 TABLET ORAL DAILY
Qty: 90 TABLET | Refills: 0 | Status: SHIPPED | OUTPATIENT
Start: 2020-06-03 | End: 2020-06-22

## 2020-06-22 RX ORDER — EZETIMIBE 10 MG/1
10 TABLET ORAL DAILY
Qty: 90 TABLET | Refills: 0 | Status: SHIPPED | OUTPATIENT
Start: 2020-06-22 | End: 2020-09-15

## 2020-11-23 DIAGNOSIS — N40.0 BENIGN PROSTATIC HYPERPLASIA WITHOUT LOWER URINARY TRACT SYMPTOMS: ICD-10-CM

## 2020-11-23 DIAGNOSIS — E78.00 PURE HYPERCHOLESTEROLEMIA: ICD-10-CM

## 2020-11-23 LAB
A/G RATIO: 2.2 (ref 1.1–2.2)
ALBUMIN SERPL-MCNC: 4.4 G/DL (ref 3.4–5)
ALP BLD-CCNC: 96 U/L (ref 40–129)
ALT SERPL-CCNC: 21 U/L (ref 10–40)
ANION GAP SERPL CALCULATED.3IONS-SCNC: 9 MMOL/L (ref 3–16)
AST SERPL-CCNC: 32 U/L (ref 15–37)
BILIRUB SERPL-MCNC: 0.5 MG/DL (ref 0–1)
BUN BLDV-MCNC: 22 MG/DL (ref 7–20)
CALCIUM SERPL-MCNC: 9.6 MG/DL (ref 8.3–10.6)
CHLORIDE BLD-SCNC: 102 MMOL/L (ref 99–110)
CHOLESTEROL, TOTAL: 147 MG/DL (ref 0–199)
CO2: 27 MMOL/L (ref 21–32)
CREAT SERPL-MCNC: 1.3 MG/DL (ref 0.8–1.3)
GFR AFRICAN AMERICAN: >60
GFR NON-AFRICAN AMERICAN: 56
GLOBULIN: 2 G/DL
GLUCOSE BLD-MCNC: 102 MG/DL (ref 70–99)
HDLC SERPL-MCNC: 58 MG/DL (ref 40–60)
LDL CHOLESTEROL CALCULATED: 75 MG/DL
POTASSIUM SERPL-SCNC: 4.4 MMOL/L (ref 3.5–5.1)
PROSTATE SPECIFIC ANTIGEN: 2.69 NG/ML (ref 0–4)
SODIUM BLD-SCNC: 138 MMOL/L (ref 136–145)
TOTAL PROTEIN: 6.4 G/DL (ref 6.4–8.2)
TRIGL SERPL-MCNC: 72 MG/DL (ref 0–150)
VLDLC SERPL CALC-MCNC: 14 MG/DL

## 2021-06-14 DIAGNOSIS — E78.00 PURE HYPERCHOLESTEROLEMIA: ICD-10-CM

## 2021-06-14 LAB
A/G RATIO: 2 (ref 1.1–2.2)
ALBUMIN SERPL-MCNC: 4.2 G/DL (ref 3.4–5)
ALP BLD-CCNC: 93 U/L (ref 40–129)
ALT SERPL-CCNC: 22 U/L (ref 10–40)
ANION GAP SERPL CALCULATED.3IONS-SCNC: 13 MMOL/L (ref 3–16)
AST SERPL-CCNC: 25 U/L (ref 15–37)
BILIRUB SERPL-MCNC: 0.3 MG/DL (ref 0–1)
BUN BLDV-MCNC: 26 MG/DL (ref 7–20)
CALCIUM SERPL-MCNC: 9.6 MG/DL (ref 8.3–10.6)
CHLORIDE BLD-SCNC: 105 MMOL/L (ref 99–110)
CHOLESTEROL, TOTAL: 155 MG/DL (ref 0–199)
CO2: 23 MMOL/L (ref 21–32)
CREAT SERPL-MCNC: 1.3 MG/DL (ref 0.8–1.3)
GFR AFRICAN AMERICAN: >60
GFR NON-AFRICAN AMERICAN: 55
GLOBULIN: 2.1 G/DL
GLUCOSE BLD-MCNC: 107 MG/DL (ref 70–99)
HDLC SERPL-MCNC: 49 MG/DL (ref 40–60)
LDL CHOLESTEROL CALCULATED: 85 MG/DL
POTASSIUM SERPL-SCNC: 5 MMOL/L (ref 3.5–5.1)
SODIUM BLD-SCNC: 141 MMOL/L (ref 136–145)
TOTAL PROTEIN: 6.3 G/DL (ref 6.4–8.2)
TRIGL SERPL-MCNC: 104 MG/DL (ref 0–150)
VLDLC SERPL CALC-MCNC: 21 MG/DL

## 2021-10-08 PROBLEM — M25.529 ELBOW PAIN: Status: ACTIVE | Noted: 2021-10-08

## 2021-12-13 DIAGNOSIS — R73.09 ABNORMAL GLUCOSE: ICD-10-CM

## 2021-12-13 DIAGNOSIS — N40.0 BENIGN PROSTATIC HYPERPLASIA WITHOUT LOWER URINARY TRACT SYMPTOMS: ICD-10-CM

## 2021-12-13 DIAGNOSIS — E78.00 PURE HYPERCHOLESTEROLEMIA: ICD-10-CM

## 2021-12-13 LAB
A/G RATIO: 1.9 (ref 1.1–2.2)
ALBUMIN SERPL-MCNC: 4.6 G/DL (ref 3.4–5)
ALP BLD-CCNC: 100 U/L (ref 40–129)
ALT SERPL-CCNC: 21 U/L (ref 10–40)
ANION GAP SERPL CALCULATED.3IONS-SCNC: 14 MMOL/L (ref 3–16)
AST SERPL-CCNC: 25 U/L (ref 15–37)
BILIRUB SERPL-MCNC: 0.4 MG/DL (ref 0–1)
BUN BLDV-MCNC: 21 MG/DL (ref 7–20)
CALCIUM SERPL-MCNC: 10 MG/DL (ref 8.3–10.6)
CHLORIDE BLD-SCNC: 99 MMOL/L (ref 99–110)
CHOLESTEROL, TOTAL: 178 MG/DL (ref 0–199)
CO2: 25 MMOL/L (ref 21–32)
CREAT SERPL-MCNC: 1.2 MG/DL (ref 0.8–1.3)
GFR AFRICAN AMERICAN: >60
GFR NON-AFRICAN AMERICAN: >60
GLUCOSE BLD-MCNC: 91 MG/DL (ref 70–99)
HDLC SERPL-MCNC: 60 MG/DL (ref 40–60)
LDL CHOLESTEROL CALCULATED: 102 MG/DL
POTASSIUM SERPL-SCNC: 5.5 MMOL/L (ref 3.5–5.1)
PROSTATE SPECIFIC ANTIGEN: 3.24 NG/ML (ref 0–4)
SODIUM BLD-SCNC: 138 MMOL/L (ref 136–145)
TOTAL PROTEIN: 7 G/DL (ref 6.4–8.2)
TRIGL SERPL-MCNC: 80 MG/DL (ref 0–150)
VLDLC SERPL CALC-MCNC: 16 MG/DL

## 2021-12-14 LAB
ESTIMATED AVERAGE GLUCOSE: 119.8 MG/DL
HBA1C MFR BLD: 5.8 %

## 2022-06-13 DIAGNOSIS — R73.09 ABNORMAL GLUCOSE: ICD-10-CM

## 2022-06-13 DIAGNOSIS — E78.00 PURE HYPERCHOLESTEROLEMIA: ICD-10-CM

## 2022-06-13 LAB
A/G RATIO: 1.8 (ref 1.1–2.2)
ALBUMIN SERPL-MCNC: 4.2 G/DL (ref 3.4–5)
ALP BLD-CCNC: 135 U/L (ref 40–129)
ALT SERPL-CCNC: 105 U/L (ref 10–40)
ANION GAP SERPL CALCULATED.3IONS-SCNC: 12 MMOL/L (ref 3–16)
AST SERPL-CCNC: 47 U/L (ref 15–37)
BILIRUB SERPL-MCNC: 0.3 MG/DL (ref 0–1)
BUN BLDV-MCNC: 24 MG/DL (ref 7–20)
CALCIUM SERPL-MCNC: 9.3 MG/DL (ref 8.3–10.6)
CHLORIDE BLD-SCNC: 105 MMOL/L (ref 99–110)
CHOLESTEROL, TOTAL: 152 MG/DL (ref 0–199)
CO2: 22 MMOL/L (ref 21–32)
CREAT SERPL-MCNC: 1.2 MG/DL (ref 0.8–1.3)
ESTIMATED AVERAGE GLUCOSE: 125.5 MG/DL
GFR AFRICAN AMERICAN: >60
GFR NON-AFRICAN AMERICAN: >60
GLUCOSE BLD-MCNC: 106 MG/DL (ref 70–99)
HBA1C MFR BLD: 6 %
HDLC SERPL-MCNC: 39 MG/DL (ref 40–60)
LDL CHOLESTEROL CALCULATED: 90 MG/DL
POTASSIUM SERPL-SCNC: 5.6 MMOL/L (ref 3.5–5.1)
SODIUM BLD-SCNC: 139 MMOL/L (ref 136–145)
TOTAL PROTEIN: 6.6 G/DL (ref 6.4–8.2)
TRIGL SERPL-MCNC: 117 MG/DL (ref 0–150)
VLDLC SERPL CALC-MCNC: 23 MG/DL

## 2022-07-14 DIAGNOSIS — R73.09 ABNORMAL GLUCOSE: ICD-10-CM

## 2022-07-14 DIAGNOSIS — E78.00 PURE HYPERCHOLESTEROLEMIA: ICD-10-CM

## 2022-07-14 DIAGNOSIS — R74.8 ABNORMAL LIVER ENZYMES: ICD-10-CM

## 2022-07-14 DIAGNOSIS — N40.0 BENIGN PROSTATIC HYPERPLASIA WITHOUT LOWER URINARY TRACT SYMPTOMS: ICD-10-CM

## 2022-07-14 LAB
A/G RATIO: 2.3 (ref 1.1–2.2)
ALBUMIN SERPL-MCNC: 4.8 G/DL (ref 3.4–5)
ALP BLD-CCNC: 106 U/L (ref 40–129)
ALT SERPL-CCNC: 18 U/L (ref 10–40)
ANION GAP SERPL CALCULATED.3IONS-SCNC: 16 MMOL/L (ref 3–16)
AST SERPL-CCNC: 27 U/L (ref 15–37)
BILIRUB SERPL-MCNC: 0.5 MG/DL (ref 0–1)
BUN BLDV-MCNC: 24 MG/DL (ref 7–20)
CALCIUM SERPL-MCNC: 10.2 MG/DL (ref 8.3–10.6)
CHLORIDE BLD-SCNC: 102 MMOL/L (ref 99–110)
CHOLESTEROL, TOTAL: 177 MG/DL (ref 0–199)
CO2: 24 MMOL/L (ref 21–32)
CREAT SERPL-MCNC: 1.4 MG/DL (ref 0.8–1.3)
GFR AFRICAN AMERICAN: >60
GFR NON-AFRICAN AMERICAN: 51
GLUCOSE BLD-MCNC: 62 MG/DL (ref 70–99)
HDLC SERPL-MCNC: 62 MG/DL (ref 40–60)
LDL CHOLESTEROL CALCULATED: 93 MG/DL
POTASSIUM SERPL-SCNC: 5.3 MMOL/L (ref 3.5–5.1)
SODIUM BLD-SCNC: 142 MMOL/L (ref 136–145)
TOTAL PROTEIN: 6.9 G/DL (ref 6.4–8.2)
TRIGL SERPL-MCNC: 112 MG/DL (ref 0–150)
VLDLC SERPL CALC-MCNC: 22 MG/DL

## 2022-07-15 LAB
ESTIMATED AVERAGE GLUCOSE: 122.6 MG/DL
HBA1C MFR BLD: 5.9 %
PROSTATE SPECIFIC ANTIGEN: 2.69 NG/ML (ref 0–4)

## 2022-08-09 ENCOUNTER — HOSPITAL ENCOUNTER (EMERGENCY)
Age: 67
Discharge: HOME | End: 2022-08-09
Attending: EMERGENCY MEDICINE
Payer: MEDICARE

## 2022-08-09 ENCOUNTER — APPOINTMENT (OUTPATIENT)
Dept: GENERAL RADIOLOGY | Age: 67
End: 2022-08-09
Payer: MEDICARE

## 2022-08-09 VITALS
HEART RATE: 81 BPM | WEIGHT: 159 LBS | BODY MASS INDEX: 22.76 KG/M2 | TEMPERATURE: 98.4 F | SYSTOLIC BLOOD PRESSURE: 151 MMHG | HEIGHT: 70 IN | OXYGEN SATURATION: 99 % | DIASTOLIC BLOOD PRESSURE: 88 MMHG | RESPIRATION RATE: 14 BRPM

## 2022-08-09 DIAGNOSIS — M25.511 ACUTE PAIN OF RIGHT SHOULDER: Primary | ICD-10-CM

## 2022-08-09 PROCEDURE — 73030 X-RAY EXAM OF SHOULDER: CPT

## 2022-08-09 PROCEDURE — 99283 EMERGENCY DEPT VISIT LOW MDM: CPT

## 2022-08-09 ASSESSMENT — PAIN DESCRIPTION - PAIN TYPE
TYPE: ACUTE PAIN
TYPE: ACUTE PAIN

## 2022-08-09 ASSESSMENT — PAIN DESCRIPTION - LOCATION
LOCATION: SHOULDER
LOCATION: SHOULDER

## 2022-08-09 ASSESSMENT — PAIN SCALES - GENERAL
PAINLEVEL_OUTOF10: 5
PAINLEVEL_OUTOF10: 5

## 2022-08-09 ASSESSMENT — PAIN - FUNCTIONAL ASSESSMENT
PAIN_FUNCTIONAL_ASSESSMENT: 0-10
PAIN_FUNCTIONAL_ASSESSMENT: 0-10

## 2022-08-09 ASSESSMENT — PAIN DESCRIPTION - ORIENTATION
ORIENTATION: RIGHT
ORIENTATION: RIGHT

## 2022-08-09 ASSESSMENT — PAIN DESCRIPTION - DESCRIPTORS
DESCRIPTORS: ACHING
DESCRIPTORS: ACHING

## 2022-08-09 ASSESSMENT — PAIN DESCRIPTION - FREQUENCY
FREQUENCY: INTERMITTENT
FREQUENCY: INTERMITTENT

## 2022-08-09 NOTE — ED NOTES
Patient to ed with complaints of a right shoulder injury which occurred yesterday after a trip and fall.      Luis Daniel Fountain RN  08/09/22 [id number]

## 2022-08-09 NOTE — ED NOTES
Patient given discharge instructions verbal and written, patient verbalized understanding. Alert/oriented X4, Clear speech.   Patient exhibits no distress, ambulates with steady gait per self leaving unit, no further request.      David Daniel RN  08/09/22 [id number]

## 2022-08-09 NOTE — ED PROVIDER NOTES
TRIAGE CHIEF COMPLAINT:   Chief Complaint   Patient presents with    Shoulder Injury     right         HPI: Angelina Grossman is a 73 y.o. male who presents to the Emergency Department with complaint of right shoulder pain. He states he tripped over the threshold this morning trying to get his dog and fell forward landing on both arms. Denies loss of consciousness. No headache, head injury or neck pain. Denies any lower extremity injury. No back pain. Complains of right anterior shoulder pain. No elbow wrist or right hand pain. He is not on blood thinners. Denies numbness or weakness. He has had previous rotator cuff surgery about 10 years ago in this shoulder. REVIEW OF SYSTEMS:  6 systems reviewed. Pertinent positives per HPI. Otherwise noted to be negative. Nursing notes reviewed and agree with above. Past medical/surgical history reviewed. MEDICATIONS   Patient's Medications   New Prescriptions    No medications on file   Previous Medications    ASPIRIN 325 MG TABLET    Take 325 mg by mouth daily.     ATORVASTATIN (LIPITOR) 40 MG TABLET    TAKE 1 TABLET BY MOUTH DAILY    CLOBETASOL (TEMOVATE) 0.05 % OINTMENT    Apply to affected area BID PRN flares    CYCLOBENZAPRINE (FLEXERIL) 10 MG TABLET    TAKE 1 TABLET BY MOUTH EVERY EVENING AS NEEDED FOR MUSCLE SPASM    ERGOTAMINE-CAFFEINE (CAFERGOT) 2-100 MG SUPPOSITORY    Place 1 suppository rectally once    EZETIMIBE (ZETIA) 10 MG TABLET    TAKE 1 TABLET BY MOUTH DAILY    GABAPENTIN (NEURONTIN) 100 MG CAPSULE    TAKE 2 CAPSULE BY MOUTH EVERY NIGHT AT BEDTIME    GABAPENTIN (NEURONTIN) 300 MG CAPSULE    TAKE 1 CAPSULE BY MOUTH EVERY NIGHT    IBUPROFEN (ADVIL;MOTRIN) 800 MG TABLET    TAKE 1 TABLET BY MOUTH EVERY 8 HOURS AS NEEDED    MONTELUKAST (SINGULAIR) 10 MG TABLET    Take 10 mg by mouth nightly    RIZATRIPTAN (MAXALT) 10 MG TABLET    TAKE UP TO 4 TABLETS BY MOUTH EVERY 7 DAYS AS NEEDED   Modified Medications    No medications on file   Discontinued Medications    No medications on file         ALLERGIES No Known Allergies      BP (!) 151/88   Pulse 81   Temp 98.4 °F (36.9 °C)   Resp 14   Ht 5' 10\" (1.778 m)   Wt 159 lb (72.1 kg)   SpO2 99%   BMI 22.81 kg/m²   General:  No acute distress. Non toxic appearance  Head:   Normocephalic and atraumatic  Eyes:   Conjunctiva clear, KRISTOPHER, EOM's intact. ENT:   Mucous membranes moist  Neck:   Supple. No adenopathy. Lungs/Chest:  No respiratory distress  CVS:   Regular rate and rhythm  Extremities:  Examination of the right upper extremity shows no deformity or bruising. Mild localized tenderness noted in the anterior aspect of the right shoulder. No obvious rotator cuff weakness. Range of motion is mildly decreased in abduction but normal in rotation. No tenderness of the clavicle, elbow, forearm, wrist or hand. Distal neurovascular exams intact. Skin:   No rashes or lesions to exposed skin  Neuro:  Alert and OX3. Speech clear and appropriate. No extremity weakness. Normal sensation in all extremities. No facial asymmetry. Gait normal.  Psych:   Affect normal. Mood normal        RADIOLOGY  XR SHOULDER RIGHT (MIN 2 VIEWS)   Final Result      No acute fracture seen. LAB      ED COURSE / MDM:  13-year-old male status post rotator cuff surgery on the right shoulder about 10 years ago, tripped over the threshold today falling forward landing on both arms and injuring the right shoulder. No loss of consciousness, head injury, headache or neck pain. He has some mild tenderness of the right shoulder without deformity or bruising. Distal neurovascular exam is intact. X-rays of the right shoulder read by the radiologist and reviewed by myself shows no evidence of acute fracture. I recommended rest and ice to the area. Advised Tylenol or ibuprofen if needed for pain. I recommended he use his own sling at home for the next 3 to 5 days if needed for pain.   Recommended follow-up with his

## 2022-08-29 PROBLEM — M25.511 ACUTE PAIN OF RIGHT SHOULDER: Status: ACTIVE | Noted: 2022-08-29

## 2022-09-01 ENCOUNTER — OFFICE VISIT (OUTPATIENT)
Dept: ORTHOPEDIC SURGERY | Age: 67
End: 2022-09-01
Payer: MEDICARE

## 2022-09-01 VITALS — BODY MASS INDEX: 22.9 KG/M2 | WEIGHT: 160 LBS | HEIGHT: 70 IN

## 2022-09-01 DIAGNOSIS — M75.21 BICIPITAL TENDINITIS OF RIGHT SHOULDER: ICD-10-CM

## 2022-09-01 DIAGNOSIS — S46.011A TRAUMATIC COMPLETE TEAR OF RIGHT ROTATOR CUFF, INITIAL ENCOUNTER: Primary | ICD-10-CM

## 2022-09-01 DIAGNOSIS — M25.511 RIGHT SHOULDER PAIN, UNSPECIFIED CHRONICITY: ICD-10-CM

## 2022-09-01 DIAGNOSIS — S43.431A SUPERIOR GLENOID LABRUM LESION OF RIGHT SHOULDER, INITIAL ENCOUNTER: ICD-10-CM

## 2022-09-01 DIAGNOSIS — S46.011A TRAUMATIC COMPLETE TEAR OF RIGHT ROTATOR CUFF, INITIAL ENCOUNTER: ICD-10-CM

## 2022-09-01 PROCEDURE — 99204 OFFICE O/P NEW MOD 45 MIN: CPT | Performed by: ORTHOPAEDIC SURGERY

## 2022-09-01 PROCEDURE — 1123F ACP DISCUSS/DSCN MKR DOCD: CPT | Performed by: ORTHOPAEDIC SURGERY

## 2022-09-01 RX ORDER — MELOXICAM 15 MG/1
15 TABLET ORAL DAILY PRN
Qty: 90 TABLET | OUTPATIENT
Start: 2022-09-01

## 2022-09-01 RX ORDER — MELOXICAM 15 MG/1
15 TABLET ORAL DAILY PRN
Qty: 30 TABLET | Refills: 0 | Status: SHIPPED | OUTPATIENT
Start: 2022-09-01 | End: 2022-10-17

## 2022-09-01 NOTE — PROGRESS NOTES
needed for Pain, Disp: 30 tablet, Rfl: 0    ezetimibe (ZETIA) 10 MG tablet, TAKE 1 TABLET BY MOUTH DAILY, Disp: 90 tablet, Rfl: 0    cyclobenzaprine (FLEXERIL) 10 MG tablet, TAKE 1 TABLET BY MOUTH EVERY EVENING AS NEEDED FOR MUSCLE SPASM, Disp: 30 tablet, Rfl: 0    gabapentin (NEURONTIN) 300 MG capsule, TAKE 1 CAPSULE BY MOUTH EVERY NIGHT, Disp: 90 capsule, Rfl: 0    gabapentin (NEURONTIN) 100 MG capsule, TAKE 2 CAPSULE BY MOUTH EVERY NIGHT AT BEDTIME, Disp: 60 capsule, Rfl: 5    ibuprofen (ADVIL;MOTRIN) 800 MG tablet, TAKE 1 TABLET BY MOUTH EVERY 8 HOURS AS NEEDED, Disp: 90 tablet, Rfl: 0    atorvastatin (LIPITOR) 40 MG tablet, TAKE 1 TABLET BY MOUTH DAILY, Disp: 90 tablet, Rfl: 3    rizatriptan (MAXALT) 10 MG tablet, TAKE UP TO 4 TABLETS BY MOUTH EVERY 7 DAYS AS NEEDED, Disp: 30 tablet, Rfl: 0    ergotamine-caffeine (CAFERGOT) 2-100 MG suppository, Place 1 suppository rectally once, Disp: , Rfl:     montelukast (SINGULAIR) 10 MG tablet, Take 10 mg by mouth nightly, Disp: , Rfl:     clobetasol (TEMOVATE) 0.05 % ointment, Apply to affected area BID PRN flares, Disp: 15 g, Rfl: 2    aspirin 325 MG tablet, Take 325 mg by mouth daily, Disp: , Rfl:      Social history: Denies IV drug use.     Social History     Socioeconomic History    Marital status:      Spouse name: Not on file    Number of children: Not on file    Years of education: Not on file    Highest education level: Not on file   Occupational History    Not on file   Tobacco Use    Smoking status: Never    Smokeless tobacco: Never   Vaping Use    Vaping Use: Never used   Substance and Sexual Activity    Alcohol use: Yes    Drug use: No    Sexual activity: Yes     Partners: Female   Other Topics Concern    Not on file   Social History Narrative    Not on file     Social Determinants of Health     Financial Resource Strain: Unknown    Difficulty of Paying Living Expenses: Patient refused   Food Insecurity: Unknown    Worried About Running Out of Food in the Last Year: Patient refused    Ran Out of Food in the Last Year: Patient refused   Transportation Needs: Not on file   Physical Activity: Not on file   Stress: Not on file   Social Connections: Not on file   Intimate Partner Violence: Not on file   Housing Stability: Not on file     Tobacco use. Social History     Tobacco Use   Smoking Status Never   Smokeless Tobacco Never     Employment: Noncontributory    Workers compensation claim: Noncontributory    Review of systems: Patient denies any fevers chills chest pain shortness of breath nausea vomiting significant weight loss any change in voiding or bowel movements. Patient denies any significant numbness or tingling at baseline as it relates to this presenting symptom/chief complaint. The patient denies any significant problems with skin or any significant allergies. Physical examination:  Body mass index is 22.96 kg/m². AAOx3, NCAT  EOMI  MMM  RR  Unlabored breathing, no wheezing  Skin intact BUE and BLE, warm and moist  Bilateral upper extremity examination specific to subjective symptoms  Exam Right Shoulder                                                Active Range of Motion (FF/Abd/ER/IR)         160/160/50/T12  Passive Range of Motion (FF/Abd/ER/IR)      170/170  Trace   Neer,    positive Becerra,      4+/5   empty Can,          5/5 ER arm at the side,       5/5   belly Press,      plus/5 and asymmetric ear Hug,       trace O'Briens,      trace TTP at Biceps Tendon Sheath,     positive Speed, positive Yergeson, negative   TTP AC Joint, negative cross arm adduction,               Skin intact throughout  5/5 D B T G IO EPL  SILT Ax, R, U, M  +2 radial pulse      Diagnostic imaging:  MY READ:  3 view right shoulder 8/9/2022 and 2 view right shoulder 9/1/2022: Negative fracture. Positive acromioclavicular joint arthrosis. Very early glenohumeral arthrosis. Type I acromion with anterior spur. Pertinent lab work: None     Diagnosis Orders   1. Traumatic complete tear of right rotator cuff, initial encounter  MRI SHOULDER RIGHT WO CONTRAST    meloxicam (MOBIC) 15 MG tablet      2. Superior glenoid labrum lesion of right shoulder, initial encounter  MRI SHOULDER RIGHT WO CONTRAST    meloxicam (MOBIC) 15 MG tablet      3. Bicipital tendinitis of right shoulder  MRI SHOULDER RIGHT WO CONTRAST    meloxicam (MOBIC) 15 MG tablet      4. Right shoulder pain, unspecified chronicity            Assessment and plan: 73 y.o. male with current subjective symptoms and physical exam findings with diagnostic imaging correlating to right shoulder traumatic rotator cuff tears setting of previous rotator cuff repair, bicipital tenosynovitis and subacromial bursitis. -Time of 23 minutes was spent coordinating and discussing the clinical findings, reviewing diagnostic imaging as indicated, coordinating care with prior notes review and current clinical encounter documentation as it pertains to the patient's presenting subjective symptoms and diagnoses. -I reviewed with the patient the imaging findings as well as clinical exam and  how it correlates to subjective symptoms.  -I had a pleasant discussion with the patient today. I reviewed with him and had additional time taken to review with him his previous history of prior rotator cuff repair and how he had recovered. I reviewed with him in detail his current clinical examination as to how it correlates to his subjective symptoms and his traumatic injury.  -I recommended a MRI to rule out rotator cuff tear is also in the setting of previous rotator cuff repair. He has clinical weakness on examination and pain. -Mobic 15 mg p.o. daily as needed pain.   OTC Tylenol per bottle as needed discomfort  -Continue activity modification to include low impact.  -Physician directed physical therapy has been provided to include Thera-Band's  -All questions answered to the patient's satisfaction and the patient expressed understanding and agreement with the above listed treatment plan  -Follow up in after MRI completed to determine additional treatment options pending results of imaging correlating to subjective symptoms and exam  -Thank you for the clinical consultation and allowing me to participate in the patient's care. Electronically signed by Sugar Clancy MD on 9/1/22 at 2:21 PM EDT         Sugar Clancy MD       Orthopaedic Surgery-Sports Medicine        Disclaimer: This note was dictated with voice recognition software. Though review and correction are routinely performed, please contact the office/medical records for any errors requiring correction.

## 2022-09-06 ENCOUNTER — TELEPHONE (OUTPATIENT)
Dept: ORTHOPEDIC SURGERY | Age: 67
End: 2022-09-06

## 2022-09-06 NOTE — TELEPHONE ENCOUNTER
Left VM with patient stating his MRI for R shoulder does not need a pre certification.  Instructed to call [facility] in [place] to schedule MRI and then to call back to schedule FU with Dr. Pacheco Escobar

## 2022-09-07 ENCOUNTER — TELEPHONE (OUTPATIENT)
Dept: ORTHOPEDIC SURGERY | Age: 67
End: 2022-09-07

## 2022-09-07 NOTE — TELEPHONE ENCOUNTER
General Question     Subject: PATIENT IS REQUESTING HIS REFERRAL FOR A MRI BE FAXED TO [facility]  Patient and /or Facility Request: Ellen Kyaw  Contact Number:  [phone]    PATIENT IS REQUESTING HIS REFERRAL FOR A MRI BE FAXED TO [facility] THE FAX NUMBER IS [phone]. PLEASE CALL PATIENT BACK AT THE ABOVE NUMBER WHEN FINISHED.

## 2022-09-09 ENCOUNTER — TELEPHONE (OUTPATIENT)
Dept: ORTHOPEDIC SURGERY | Age: 67
End: 2022-09-09

## 2022-09-09 NOTE — TELEPHONE ENCOUNTER
General Question     Subject: MRI PRO SCAN  Patient and /or Facility Request: Alex Charlton  Contact Number: [phone]        PATIENT IS TRYING TO GET [facility] IN [facility] ON [facility]. FX NUMBER IS [id number].

## 2022-09-20 ENCOUNTER — TELEPHONE (OUTPATIENT)
Dept: ORTHOPEDIC SURGERY | Age: 67
End: 2022-09-20

## 2022-09-20 NOTE — TELEPHONE ENCOUNTER
I LEFT VM WITH PATIENT TO LET THEM KNOW THEY ARE APPROVED FOR MRI. I LEFT PHONE NUMBERS AND CONTACT INFO [facility].  Verena Wilcox

## 2022-10-11 ENCOUNTER — OFFICE VISIT (OUTPATIENT)
Dept: ORTHOPEDIC SURGERY | Age: 67
End: 2022-10-11
Payer: MEDICARE

## 2022-10-11 VITALS — WEIGHT: 160 LBS | BODY MASS INDEX: 22.9 KG/M2 | HEIGHT: 70 IN

## 2022-10-11 DIAGNOSIS — M75.21 BICIPITAL TENDINITIS OF SHOULDER, RIGHT: ICD-10-CM

## 2022-10-11 DIAGNOSIS — S46.011A TRAUMATIC COMPLETE TEAR OF RIGHT ROTATOR CUFF, INITIAL ENCOUNTER: Primary | ICD-10-CM

## 2022-10-11 DIAGNOSIS — M75.51 SUBACROMIAL BURSITIS OF RIGHT SHOULDER JOINT: ICD-10-CM

## 2022-10-11 PROCEDURE — L3670 SO ACRO/CLAV CAN WEB PRE OTS: HCPCS | Performed by: ORTHOPAEDIC SURGERY

## 2022-10-11 PROCEDURE — 99214 OFFICE O/P EST MOD 30 MIN: CPT | Performed by: ORTHOPAEDIC SURGERY

## 2022-10-11 PROCEDURE — 1123F ACP DISCUSS/DSCN MKR DOCD: CPT | Performed by: ORTHOPAEDIC SURGERY

## 2022-10-11 NOTE — LETTER
[id number]   DR. Bird Rodarte     TODAY'S DATE: 10/11/22    PATIENT'S NAME: Brett Garcia    PATIENT'S NUMBER: [id number]    : 1955    PREFERRED PHONE NUMBER: [phone]      WORK PHONE NUMBER: [phone] (work)         DIAGNOSIS:   1. Traumatic complete tear of right rotator cuff, initial encounter    2. Bicipital tendinitis of shoulder, right    3.  Subacromial bursitis of right shoulder joint        PROCEDURE: Right shoulder arthroscopic revision rotator cuff repair, possible allograft augmentation, subacromial decompression and open subpectoral biceps tenodesis    SURGEON: Dr. Maxie Meigs: Elective    [facility]PITAL: [facility]: Outpatient/Same Day Surgery    ANESTHESIA: General  Pre-Op Block requested NONE    LENGTH OF SURGERY: 2.5 Hr    EQUIPMENT REQUESTED: Arthrex Biceps Tenodesis set, Arthrex Corkscrews/Swivel Locks and Arthrex SutureTaks / FiberTaks      X-RAYS REQUIRED: None    ANTIBIOTICS: Ancef 2gm IV x 1    MEDICATIONS: None    LABS: None          PCP: Sangeeta Ayala MD    H&P TO BE DONE BY THE PCP      CARDIAC CLEARANCE NEEDED: No     ALLERGIES: No Known Allergies    DME: Shoulder UltraSling (Belly Pillow)    POST-OP VISIT: 10-12 days    OTHER INSTRUCTIONS/REMARKS: Arthoflex augmentation patch kit - Arthrex    INSURANCE INFORMATION: _________________________ CARD IN MEDIA IN EPIC___  CARD FAXED___    PRE-CERTIFICATION REQUIRED: YES   NO   PER _______________________    SURGERY SCHEDULED BY: ____________________________________    PATIENT CALLED AND CONFIRMED DATE & TIME: ______________________             Saskia Dumont MD       Orthopaedic Surgery - Sports Medicine

## 2022-10-11 NOTE — PROGRESS NOTES
FOLLOW UP ORTHOPAEDIC NOTE    The patient follows up today for reevaluation of right shoulder. The patient states 5/10 pain right shoulder. He received his MRI and follows up for results and determination of additional treatment options. He had been out of town for a few weeks and so that is ultimately with the follow-up interval difference    PE:  AAOx3  RR  Unlabored breathing  Skin warm and moist  Focused physical examination of the right shoulder  Positive tenderness palpation biceps tendon sheath, positive Speed positive Yergason positive Becerra positive Neer 4+/5 supraspinatus 5/5 infraspinatus, 4/5 subscapularis with asymmetry. Nontender to palpation acromioclavicular joint    Pertinent radiographs/imaging:  MRI 10/6/2022  CONCLUSION:   1. Full-depth supraspinatus tendon tear distal insertion fibers 1.3 x 1.9cm retracted fibers    level of the superior humeral head. 2. Full-depth near-complete subscapularis tendon tear middle and inferior fibers. Tendinotic    fibers attached to the footprint. Grade 1 subscapularis strain. 3. Tendinosis and partial-thickness tear long head biceps tendon split component bicipital    groove. 4. Confluent hypertrophic infraspinatus tendinosis. 5. Large glenohumeral bursal effusion. MY READ: Full-thickness supraspinatus tear with retraction to mid to lateral humeral head. Infraspinatus with tendinosis albeit intact. Positive subacromial edema. Positive acromioclavicular joint edema/arthrosis. Positive full-thickness subscapularis tear. Positive fluid in the biceps tendon sheath with associated degenerative superior labrum. Grade 2/3 changes supraspinatus, grade 1/2 changes subscapularis although limited based incomplete visualization medially     Diagnosis Orders   1.  Traumatic complete tear of right rotator cuff, initial encounter  Breg Sling Shot 2 Shoulder Sling        Assessment and plan: 78 male with continued subjective symptoms of right shoulder pain with known, correlating diagnosis of right shoulder full-thickness rotator cuff tears and bicipital tenosynovitis. -Time of 16 minutes was spent coordinating and discussing the clinical findings and diagnostic imaging results as they pertain to the patient's presenting subjective symptoms.  -I had a pleasant discussion with the patient and his wife who accompanies him. At this time I reviewed with him directly his MRI images. He does have a previous failed rotator cuff repair. This was reported as by Dr. Tip Alegria having been performed in 2011. I asked for the patient to be able to try to obtain the operative report to be able to look at previous tear pattern and injury and how repaired.  -I did review with the patient long-term sequelae with nonoperative management versus surgical treatment options. I did review with him likelihood of success in the setting of revision rotator cuff repair. The imaging did not go as far medial as I would have hoped to be able to fully ascertain grading fatty infiltration/atrophy however this appears to be roughly grade 2 changes on the supraspinatus possible 3 and grade 1/2 changes on the subscapularis. While previous injury to the previous repaired rotator cuff could have been present given relative chronicity, patient states that he was doing well except for most recent breaking up a dog fight as previously reported  -At this time understand the risk and benefits of nonoperative versus operative measures, the patient wishes to proceed with surgery. We will tentatively plan for 26 October as he has multiple logistics that he is working through. This would be for a right shoulder arthroscopic revision rotator cuff repair (possible allograft augmentation) and subpectoral biceps tenodesis to include subacromial decompression.   --Currently the patient wishes to proceed with surgery given right shoulder symptoms from right shoulder injury as assessed by clinical exam and diagnostic imaging, which correlates to subjective symptoms. Understanding the risks and benefits of nonoperative versus operative treatments, he wishes to pursue surgery. Benefits of decreased pain and improved function were discussed with the patient as well as potential risks which included but were not limited to damage to nerves arteries tendons veins infection bleeding continued pain, new onset pain, stiffness, loss of range of motion, painful scar tissue, ligament instability, instability, chondromalacia related symptoms, need for revision surgery, need for additional surgery, venous thromboembolism, and ultimately death. Understanding this, a signed document consent will be placed in the patient's chart for right shoulder arthroscopic revision rotator cuff repair, possible allograft augmentation, subacromial decompression and open subpectoral biceps tenodesis. -The patient understands the perioperative course and what it would entail  -He is going to work on some logistics on his end and he was educated as to preoperative clearance to be obtained through PCP. -Patient understands that I will be out the majority the month of November 4 to 6 weeks and ultimately he will follow-up with one of my colleagues for initial suture discontinuation. Given massive rotator cuff repair what I would anticipate him likely not starting formal physical therapy until 1 month postop. I would then plan to see him at his 6-week postop visit.  -All questions answered to the patient's satisfaction and the patient expressed understanding and agreement with the above listed treatment plan  -Follow up in 10-12d postop for suture discontinuation and ensuring that he has physical therapy scheduled to start 1 month after surgery unless changed from intraoperative op report-Thank you for the clinical consultation and allowing me to participate in the patient's care.       Electronically signed by Magaly Friedman MD on 10/11/22 at 5:12 PM EDT         Rey Stovall MD       Orthopaedic Surgery-Sports Medicine    Disclaimer: This note was dictated with voice recognition software. Though review and correction are routinely performed, please contact the office/medical records for any errors requiring correction.

## 2022-10-14 ENCOUNTER — TELEPHONE (OUTPATIENT)
Dept: ORTHOPEDIC SURGERY | Age: 67
End: 2022-10-14

## 2022-10-14 PROBLEM — Z01.818 PRE-OP EVALUATION: Status: ACTIVE | Noted: 2022-10-14

## 2022-10-14 NOTE — TELEPHONE ENCOUNTER
SUBMITTED OUTPATIENT SX PA R SHOULDER VIA [facility] WEBSITE. PENDING STATUS [id number].

## 2022-10-17 NOTE — TELEPHONE ENCOUNTER
[id number]    Date: 10/26/22  Type of SX:  OUTPATIENT  Location: [facility]  CPT: 76899   SX area: R SHOULDER  Insurance: Jalen Dennys

## 2022-10-18 NOTE — PROGRESS NOTES
Atif Polanco    Age 78 y.o.    male    1955    MRN [id number]    10/26/2022  Arrival Time_____________  OR Time____________[address]     Procedure(s):  VIDEO ARTHROSCOPY RIGHT SHOULDER, REVISION OF ROTATOR CUFF REPAIR, POSSIBLE ALLOGRAFT AUGMENTATION, SUBACROMIAL DECOMPRESSION AND SUBPECTORAL BICEPS TENODESIS                      General    Surgeon(s):  Alfredito Barth MD       Phone [phone] (home) [phone] (work)    [address] [place] [address]         Work  _____________________________________________________________________  _____________________________________________________________________  _____________________________________________________________________  _____________________________________________________________________  _____________________________________________________________________    PCP _____________________________ Phone_________________     H&P__________________Bringing      Chart            Epic   DOS      Called________  EKG__________________Bringing      Chart            Epic   DOS      Called________  LAB__________________ Bringing      Chart            Epic   DOS      Called________  Cardiac Clearance_______Bringing      Chart            Epic      DOS      Called________    Cardiologist________________________ Phone___________________________    ? Pent[employer]alism concerns / Waiver on Chart            [employer]________________  ? Pre-op Instructions Given [place]          _________________________________  ? Directions to Surgery Center                          _________________________________  ? Transportation Home_______________      __________________________________  ?  Crutches/Walker__________________        __________________________________    ________Pre-op Orders   _______Transcribed    _______Wt.  ________Pharmacy          _______SCD  ______VTE     ______[facility]  _______  Surgery Consent    _______  Anesthesia Consent         COVID DATE______________LOCATION________________ RESULT__________

## 2022-10-20 DIAGNOSIS — Z47.89 ORTHOPEDIC AFTERCARE: Primary | ICD-10-CM

## 2022-10-21 NOTE — PROGRESS NOTES
Obstructive Sleep Apnea (ASHLYN) Screening     Patient:  Rubio Tatum    YOB: 1955      Medical Record #:  [id number]                     Date:  10/21/2022     1. Are you a loud and/or regular snorer? []  Yes       [x] No    2. Have you been observed to gasp or stop breathing during sleep? []  Yes       [x] No    3. Do you feel tired or groggy upon awakening or do you awaken with a headache?           []  Yes       [] No    4. Are you often tired or fatigued during the wake time hours? []  Yes       [] No    5. Do you fall asleep sitting, reading, watching TV or driving? []  Yes       [] No    6. Do you often have problems with memory or concentration? []  Yes       [] No    **If patient's score is ? 3 they are considered high risk for ASHLYN. An Anesthesia provider will evaluate the patient and develop a plan of care the day of surgery. Note:  If the patient's BMI is more than 35 kg m¯² , has neck circumference > 40 cm, and/or high blood pressure the risk is greater (© American Sleep Apnea Association, 2006).

## 2022-10-21 NOTE — PROGRESS NOTES

## 2022-10-25 ENCOUNTER — ANESTHESIA EVENT (OUTPATIENT)
Dept: OPERATING ROOM | Age: 67
End: 2022-10-25
Payer: MEDICARE

## 2022-10-25 ENCOUNTER — OFFICE VISIT (OUTPATIENT)
Dept: DERMATOLOGY | Age: 67
End: 2022-10-25
Payer: MEDICARE

## 2022-10-25 DIAGNOSIS — L82.1 SEBORRHEIC KERATOSES: ICD-10-CM

## 2022-10-25 DIAGNOSIS — D22.9 BENIGN NEVUS: Primary | ICD-10-CM

## 2022-10-25 PROCEDURE — 99203 OFFICE O/P NEW LOW 30 MIN: CPT | Performed by: DERMATOLOGY

## 2022-10-25 PROCEDURE — 1123F ACP DISCUSS/DSCN MKR DOCD: CPT | Performed by: DERMATOLOGY

## 2022-10-25 NOTE — PROGRESS NOTES
[facility]) Dermatology  Donna Cardenas M.D.  [phone]       Holli Nicole  1955    78 y.o. male     Date of Visit: 10/25/2022    Chief Complaint:   Chief Complaint   Patient presents with    Skin Lesion        I was asked to see this patient by Dr. Zander Nava. History of Present Illness:  1. TBSE    Multiple nevi. Patient has not noticed any new or changing pigmented lesions. Stable in size, shape, color. Not itching, bleeding. Seborrheic keratoses. Increasing number of hyperkeratotic stuck on papules and plaques over the torso. No discrete lesion itching, bleeding, becoming symptomatic. No prior personal history of skin cancer. Father with a history of actinic keratoses. Does wear a hat regularly. Wife is a patient    Review of Systems:  Constitutional: Reports general sense of well-being       Past Medical History, Surgical History, Family History, Medications and Allergies reviewed.     Social History:   Social History     Socioeconomic History    Marital status:      Spouse name: Not on file    Number of children: Not on file    Years of education: Not on file    Highest education level: Not on file   Occupational History    Not on file   Tobacco Use    Smoking status: Never    Smokeless tobacco: Never   Vaping Use    Vaping Use: Never used   Substance and Sexual Activity    Alcohol use: Yes     Comment: 0-1 drink per month    Drug use: No    Sexual activity: Yes     Partners: Female   Other Topics Concern    Not on file   Social History Narrative    Not on file     Social Determinants of Health     Financial Resource Strain: Low Risk     Difficulty of Paying Living Expenses: Not hard at all   Food Insecurity: No Food Insecurity    Worried About Running Out of Food in the Last Year: Never true    Ran Out of Food in the Last Year: Never true   Transportation Needs: Not on file   Physical Activity: Not on file   Stress: Not on file   Social Connections: Not on file Intimate Partner Violence: Not on file   Housing Stability: Not on file       Physical Examination       -General: Well-appearing, NAD  1. Normal affect. Total body skin exam including scalp, face, neck, chest, abdomen, back, bilateral upper extremities, bilateral lower extremities excluding socks, ocular conjunctiva, external lips, and nails was performed. Examination normal unless stated below. Underwear area not examined. Scattered on the trunk and extremities are multiple well-defined round and oval symmetric smoothly-bordered uniformly brown macules and papules. Multiple hyperkeratotic stuck on papules and plaques torso      Assessment and Plan     1. Benign nevus - Benign acquired melanocytic nevi  -Recommend monthly self skin exams   -Educated regarding the ABCDEs of melanoma detection   -Call for any new/changing moles or concerning lesions  -Reviewed sun protective behavior -- sun avoidance during the peak hours of the day, sun-protective clothing (including hat and sunglasses), sunscreen use (water resistant, broad spectrum, SPF at least 30, need for reapplication every 2 to 3 hours), avoidance of tanning beds      2. Seborrheic keratoses - Discussed underlying nature of seborrheic keratosis and low risk of malignancy. Treatment reserved for lesions that are itching, bleeding, growing or otherwise becoming bothersome. Discussed monitoring for change with reevaluation for changing lesions.

## 2022-10-26 ENCOUNTER — HOSPITAL ENCOUNTER (OUTPATIENT)
Age: 67
Setting detail: OUTPATIENT SURGERY
Discharge: HOME | End: 2022-10-26
Attending: ORTHOPAEDIC SURGERY | Admitting: ORTHOPAEDIC SURGERY
Payer: MEDICARE

## 2022-10-26 ENCOUNTER — ANESTHESIA (OUTPATIENT)
Dept: OPERATING ROOM | Age: 67
End: 2022-10-26
Payer: MEDICARE

## 2022-10-26 ENCOUNTER — TELEPHONE (OUTPATIENT)
Dept: ORTHOPEDIC SURGERY | Age: 67
End: 2022-10-26

## 2022-10-26 ENCOUNTER — HOSPITAL ENCOUNTER (EMERGENCY)
Age: 67
Discharge: HOME | End: 2022-10-26
Attending: EMERGENCY MEDICINE
Payer: MEDICARE

## 2022-10-26 VITALS
SYSTOLIC BLOOD PRESSURE: 155 MMHG | BODY MASS INDEX: 23.77 KG/M2 | DIASTOLIC BLOOD PRESSURE: 88 MMHG | HEIGHT: 70 IN | HEART RATE: 78 BPM | RESPIRATION RATE: 14 BRPM | TEMPERATURE: 97.7 F | WEIGHT: 166 LBS | OXYGEN SATURATION: 100 %

## 2022-10-26 VITALS
HEIGHT: 70 IN | HEART RATE: 63 BPM | TEMPERATURE: 97.5 F | WEIGHT: 160 LBS | RESPIRATION RATE: 10 BRPM | DIASTOLIC BLOOD PRESSURE: 79 MMHG | BODY MASS INDEX: 22.9 KG/M2 | SYSTOLIC BLOOD PRESSURE: 129 MMHG | OXYGEN SATURATION: 94 %

## 2022-10-26 DIAGNOSIS — R33.8 ACUTE URINARY RETENTION: Primary | ICD-10-CM

## 2022-10-26 DIAGNOSIS — Z47.89 ORTHOPEDIC AFTERCARE: Primary | ICD-10-CM

## 2022-10-26 LAB
BILIRUBIN URINE: NEGATIVE
BLOOD, URINE: NEGATIVE
CLARITY: CLEAR
COLOR: YELLOW
GLUCOSE URINE: 250 MG/DL
KETONES, URINE: ABNORMAL MG/DL
LEUKOCYTE ESTERASE, URINE: NEGATIVE
MICROSCOPIC EXAMINATION: ABNORMAL
NITRITE, URINE: NEGATIVE
PH UA: 6.5 (ref 5–8)
PROTEIN UA: NEGATIVE MG/DL
SPECIFIC GRAVITY UA: 1.01 (ref 1–1.03)
URINE REFLEX TO CULTURE: ABNORMAL
URINE TYPE: ABNORMAL
UROBILINOGEN, URINE: 0.2 E.U./DL

## 2022-10-26 PROCEDURE — 7100000011 HC PHASE II RECOVERY - ADDTL 15 MIN: Performed by: ORTHOPAEDIC SURGERY

## 2022-10-26 PROCEDURE — C1713 ANCHOR/SCREW BN/BN,TIS/BN: HCPCS | Performed by: ORTHOPAEDIC SURGERY

## 2022-10-26 PROCEDURE — 2580000003 HC RX 258: Performed by: ANESTHESIOLOGY

## 2022-10-26 PROCEDURE — 2720000010 HC SURG SUPPLY STERILE: Performed by: ORTHOPAEDIC SURGERY

## 2022-10-26 PROCEDURE — 23430 REPAIR BICEPS TENDON: CPT | Performed by: ORTHOPAEDIC SURGERY

## 2022-10-26 PROCEDURE — 6360000002 HC RX W HCPCS: Performed by: ANESTHESIOLOGY

## 2022-10-26 PROCEDURE — 6360000002 HC RX W HCPCS: Performed by: ORTHOPAEDIC SURGERY

## 2022-10-26 PROCEDURE — 2709999900 HC NON-CHARGEABLE SUPPLY: Performed by: ORTHOPAEDIC SURGERY

## 2022-10-26 PROCEDURE — 7100000010 HC PHASE II RECOVERY - FIRST 15 MIN: Performed by: ORTHOPAEDIC SURGERY

## 2022-10-26 PROCEDURE — 51798 US URINE CAPACITY MEASURE: CPT

## 2022-10-26 PROCEDURE — 2500000003 HC RX 250 WO HCPCS: Performed by: ORTHOPAEDIC SURGERY

## 2022-10-26 PROCEDURE — 99283 EMERGENCY DEPT VISIT LOW MDM: CPT

## 2022-10-26 PROCEDURE — 76942 ECHO GUIDE FOR BIOPSY: CPT | Performed by: ANESTHESIOLOGY

## 2022-10-26 PROCEDURE — A4217 STERILE WATER/SALINE, 500 ML: HCPCS | Performed by: ORTHOPAEDIC SURGERY

## 2022-10-26 PROCEDURE — 29827 SHO ARTHRS SRG RT8TR CUF RPR: CPT | Performed by: ORTHOPAEDIC SURGERY

## 2022-10-26 PROCEDURE — 2500000003 HC RX 250 WO HCPCS

## 2022-10-26 PROCEDURE — 2580000003 HC RX 258: Performed by: ORTHOPAEDIC SURGERY

## 2022-10-26 PROCEDURE — 7100000000 HC PACU RECOVERY - FIRST 15 MIN: Performed by: ORTHOPAEDIC SURGERY

## 2022-10-26 PROCEDURE — 6370000000 HC RX 637 (ALT 250 FOR IP): Performed by: ANESTHESIOLOGY

## 2022-10-26 PROCEDURE — 81003 URINALYSIS AUTO W/O SCOPE: CPT

## 2022-10-26 PROCEDURE — C1776 JOINT DEVICE (IMPLANTABLE): HCPCS | Performed by: ORTHOPAEDIC SURGERY

## 2022-10-26 PROCEDURE — 7100000001 HC PACU RECOVERY - ADDTL 15 MIN: Performed by: ORTHOPAEDIC SURGERY

## 2022-10-26 PROCEDURE — 3700000001 HC ADD 15 MINUTES (ANESTHESIA): Performed by: ORTHOPAEDIC SURGERY

## 2022-10-26 PROCEDURE — 29826 SHO ARTHRS SRG DECOMPRESSION: CPT | Performed by: ORTHOPAEDIC SURGERY

## 2022-10-26 PROCEDURE — 3600000004 HC SURGERY LEVEL 4 BASE: Performed by: ORTHOPAEDIC SURGERY

## 2022-10-26 PROCEDURE — 3600000014 HC SURGERY LEVEL 4 ADDTL 15MIN: Performed by: ORTHOPAEDIC SURGERY

## 2022-10-26 PROCEDURE — 6360000002 HC RX W HCPCS

## 2022-10-26 PROCEDURE — 3700000000 HC ANESTHESIA ATTENDED CARE: Performed by: ORTHOPAEDIC SURGERY

## 2022-10-26 DEVICE — PROXIMAL TENODESIS IMPLANT SYSTEM REV: 0
Type: IMPLANTABLE DEVICE | Site: HUMERUS | Status: FUNCTIONAL
Brand: ARTHREX®

## 2022-10-26 DEVICE — ANCHOR SUTURE FIBER TAK SELF PUNCHING 2.6MM: Type: IMPLANTABLE DEVICE | Site: HUMERUS | Status: FUNCTIONAL

## 2022-10-26 DEVICE — BIO-COMP SWIVELOCK C, CLD 5.5X19.1MM
Type: IMPLANTABLE DEVICE | Site: HUMERUS | Status: FUNCTIONAL
Brand: ARTHREX®

## 2022-10-26 RX ORDER — ROPIVACAINE HYDROCHLORIDE 5 MG/ML
INJECTION, SOLUTION EPIDURAL; INFILTRATION; PERINEURAL
Status: COMPLETED | OUTPATIENT
Start: 2022-10-26 | End: 2022-10-26

## 2022-10-26 RX ORDER — MAGNESIUM HYDROXIDE 1200 MG/15ML
LIQUID ORAL CONTINUOUS PRN
Status: COMPLETED | OUTPATIENT
Start: 2022-10-26 | End: 2022-10-26

## 2022-10-26 RX ORDER — HYDRALAZINE HYDROCHLORIDE 20 MG/ML
10 INJECTION INTRAMUSCULAR; INTRAVENOUS
Status: DISCONTINUED | OUTPATIENT
Start: 2022-10-26 | End: 2022-10-26 | Stop reason: HOSPADM

## 2022-10-26 RX ORDER — BUPIVACAINE HYDROCHLORIDE 2.5 MG/ML
INJECTION, SOLUTION EPIDURAL; INFILTRATION; INTRACAUDAL PRN
Status: DISCONTINUED | OUTPATIENT
Start: 2022-10-26 | End: 2022-10-26 | Stop reason: ALTCHOICE

## 2022-10-26 RX ORDER — TRANEXAMIC ACID 100 MG/ML
INJECTION, SOLUTION INTRAVENOUS PRN
Status: DISCONTINUED | OUTPATIENT
Start: 2022-10-26 | End: 2022-10-26 | Stop reason: SDUPTHER

## 2022-10-26 RX ORDER — FENTANYL CITRATE 50 UG/ML
INJECTION, SOLUTION INTRAMUSCULAR; INTRAVENOUS PRN
Status: DISCONTINUED | OUTPATIENT
Start: 2022-10-26 | End: 2022-10-26 | Stop reason: SDUPTHER

## 2022-10-26 RX ORDER — SODIUM CHLORIDE 0.9 % (FLUSH) 0.9 %
5-40 SYRINGE (ML) INJECTION PRN
Status: DISCONTINUED | OUTPATIENT
Start: 2022-10-26 | End: 2022-10-26 | Stop reason: HOSPADM

## 2022-10-26 RX ORDER — ONDANSETRON 2 MG/ML
4 INJECTION INTRAMUSCULAR; INTRAVENOUS
Status: DISCONTINUED | OUTPATIENT
Start: 2022-10-26 | End: 2022-10-26 | Stop reason: HOSPADM

## 2022-10-26 RX ORDER — ROCURONIUM BROMIDE 10 MG/ML
INJECTION, SOLUTION INTRAVENOUS PRN
Status: DISCONTINUED | OUTPATIENT
Start: 2022-10-26 | End: 2022-10-26 | Stop reason: SDUPTHER

## 2022-10-26 RX ORDER — DEXAMETHASONE SODIUM PHOSPHATE 10 MG/ML
4 INJECTION INTRAMUSCULAR; INTRAVENOUS
Status: DISCONTINUED | OUTPATIENT
Start: 2022-10-26 | End: 2022-10-26 | Stop reason: HOSPADM

## 2022-10-26 RX ORDER — TRANEXAMIC ACID 100 MG/ML
1000 INJECTION, SOLUTION INTRAVENOUS ONCE
Status: DISCONTINUED | OUTPATIENT
Start: 2022-10-26 | End: 2022-10-26 | Stop reason: HOSPADM

## 2022-10-26 RX ORDER — SODIUM CHLORIDE, SODIUM LACTATE, POTASSIUM CHLORIDE, CALCIUM CHLORIDE 600; 310; 30; 20 MG/100ML; MG/100ML; MG/100ML; MG/100ML
INJECTION, SOLUTION INTRAVENOUS CONTINUOUS
Status: DISCONTINUED | OUTPATIENT
Start: 2022-10-26 | End: 2022-10-26 | Stop reason: HOSPADM

## 2022-10-26 RX ORDER — DEXAMETHASONE SODIUM PHOSPHATE 4 MG/ML
INJECTION, SOLUTION INTRA-ARTICULAR; INTRALESIONAL; INTRAMUSCULAR; INTRAVENOUS; SOFT TISSUE PRN
Status: DISCONTINUED | OUTPATIENT
Start: 2022-10-26 | End: 2022-10-26 | Stop reason: SDUPTHER

## 2022-10-26 RX ORDER — IPRATROPIUM BROMIDE AND ALBUTEROL SULFATE 2.5; .5 MG/3ML; MG/3ML
1 SOLUTION RESPIRATORY (INHALATION)
Status: DISCONTINUED | OUTPATIENT
Start: 2022-10-26 | End: 2022-10-26 | Stop reason: HOSPADM

## 2022-10-26 RX ORDER — OXYCODONE HYDROCHLORIDE 5 MG/1
5 TABLET ORAL EVERY 6 HOURS PRN
Qty: 20 TABLET | Refills: 0 | Status: SHIPPED | OUTPATIENT
Start: 2022-10-26 | End: 2022-10-31

## 2022-10-26 RX ORDER — SODIUM CHLORIDE 9 MG/ML
INJECTION, SOLUTION INTRAVENOUS PRN
Status: DISCONTINUED | OUTPATIENT
Start: 2022-10-26 | End: 2022-10-26 | Stop reason: HOSPADM

## 2022-10-26 RX ORDER — DIPHENHYDRAMINE HYDROCHLORIDE 50 MG/ML
12.5 INJECTION INTRAMUSCULAR; INTRAVENOUS
Status: DISCONTINUED | OUTPATIENT
Start: 2022-10-26 | End: 2022-10-26 | Stop reason: HOSPADM

## 2022-10-26 RX ORDER — SODIUM CHLORIDE 0.9 % (FLUSH) 0.9 %
5-40 SYRINGE (ML) INJECTION EVERY 12 HOURS SCHEDULED
Status: DISCONTINUED | OUTPATIENT
Start: 2022-10-26 | End: 2022-10-26 | Stop reason: HOSPADM

## 2022-10-26 RX ORDER — EPHEDRINE SULFATE 50 MG/ML
INJECTION, SOLUTION INTRAVENOUS PRN
Status: DISCONTINUED | OUTPATIENT
Start: 2022-10-26 | End: 2022-10-26 | Stop reason: SDUPTHER

## 2022-10-26 RX ORDER — OXYCODONE HYDROCHLORIDE 5 MG/1
5 TABLET ORAL
Status: DISCONTINUED | OUTPATIENT
Start: 2022-10-26 | End: 2022-10-26 | Stop reason: HOSPADM

## 2022-10-26 RX ORDER — ONDANSETRON 2 MG/ML
INJECTION INTRAMUSCULAR; INTRAVENOUS PRN
Status: DISCONTINUED | OUTPATIENT
Start: 2022-10-26 | End: 2022-10-26 | Stop reason: SDUPTHER

## 2022-10-26 RX ORDER — ONDANSETRON 4 MG/1
4 TABLET, ORALLY DISINTEGRATING ORAL EVERY 8 HOURS PRN
Status: DISCONTINUED | OUTPATIENT
Start: 2022-10-26 | End: 2022-10-26 | Stop reason: HOSPADM

## 2022-10-26 RX ORDER — ACETAMINOPHEN 325 MG/1
650 TABLET ORAL
Status: DISCONTINUED | OUTPATIENT
Start: 2022-10-26 | End: 2022-10-26 | Stop reason: HOSPADM

## 2022-10-26 RX ORDER — MIDAZOLAM HYDROCHLORIDE 1 MG/ML
INJECTION INTRAMUSCULAR; INTRAVENOUS PRN
Status: DISCONTINUED | OUTPATIENT
Start: 2022-10-26 | End: 2022-10-26 | Stop reason: SDUPTHER

## 2022-10-26 RX ORDER — PROPOFOL 10 MG/ML
INJECTION, EMULSION INTRAVENOUS PRN
Status: DISCONTINUED | OUTPATIENT
Start: 2022-10-26 | End: 2022-10-26 | Stop reason: SDUPTHER

## 2022-10-26 RX ORDER — GLYCOPYRROLATE 0.2 MG/ML
INJECTION INTRAMUSCULAR; INTRAVENOUS PRN
Status: DISCONTINUED | OUTPATIENT
Start: 2022-10-26 | End: 2022-10-26 | Stop reason: SDUPTHER

## 2022-10-26 RX ORDER — LIDOCAINE HYDROCHLORIDE 10 MG/ML
0.3 INJECTION, SOLUTION EPIDURAL; INFILTRATION; INTRACAUDAL; PERINEURAL
Status: DISCONTINUED | OUTPATIENT
Start: 2022-10-26 | End: 2022-10-26 | Stop reason: HOSPADM

## 2022-10-26 RX ORDER — LIDOCAINE HYDROCHLORIDE 20 MG/ML
INJECTION, SOLUTION INFILTRATION; PERINEURAL PRN
Status: DISCONTINUED | OUTPATIENT
Start: 2022-10-26 | End: 2022-10-26 | Stop reason: SDUPTHER

## 2022-10-26 RX ORDER — MIDAZOLAM HYDROCHLORIDE 1 MG/ML
2 INJECTION INTRAMUSCULAR; INTRAVENOUS
Status: DISCONTINUED | OUTPATIENT
Start: 2022-10-26 | End: 2022-10-26 | Stop reason: HOSPADM

## 2022-10-26 RX ORDER — ONDANSETRON 4 MG/1
TABLET, ORALLY DISINTEGRATING ORAL
Status: DISCONTINUED
Start: 2022-10-26 | End: 2022-10-26 | Stop reason: HOSPADM

## 2022-10-26 RX ORDER — MEPERIDINE HYDROCHLORIDE 50 MG/ML
12.5 INJECTION INTRAMUSCULAR; INTRAVENOUS; SUBCUTANEOUS EVERY 5 MIN PRN
Status: DISCONTINUED | OUTPATIENT
Start: 2022-10-26 | End: 2022-10-26 | Stop reason: HOSPADM

## 2022-10-26 RX ADMIN — LIDOCAINE HYDROCHLORIDE 40 MG: 20 INJECTION, SOLUTION INFILTRATION; PERINEURAL at 07:37

## 2022-10-26 RX ADMIN — ROCURONIUM BROMIDE 50 MG: 10 SOLUTION INTRAVENOUS at 07:37

## 2022-10-26 RX ADMIN — EPHEDRINE SULFATE 5 MG: 50 INJECTION INTRAMUSCULAR; INTRAVENOUS; SUBCUTANEOUS at 10:16

## 2022-10-26 RX ADMIN — PHENYLEPHRINE HYDROCHLORIDE 100 MCG: 10 INJECTION INTRAVENOUS at 10:35

## 2022-10-26 RX ADMIN — FENTANYL CITRATE 100 MCG: 50 INJECTION INTRAMUSCULAR; INTRAVENOUS at 07:00

## 2022-10-26 RX ADMIN — EPHEDRINE SULFATE 10 MG: 50 INJECTION INTRAMUSCULAR; INTRAVENOUS; SUBCUTANEOUS at 08:13

## 2022-10-26 RX ADMIN — ONDANSETRON 4 MG: 2 INJECTION INTRAMUSCULAR; INTRAVENOUS at 10:43

## 2022-10-26 RX ADMIN — EPHEDRINE SULFATE 10 MG: 50 INJECTION INTRAMUSCULAR; INTRAVENOUS; SUBCUTANEOUS at 09:49

## 2022-10-26 RX ADMIN — PHENYLEPHRINE HYDROCHLORIDE 50 MCG: 10 INJECTION INTRAVENOUS at 08:52

## 2022-10-26 RX ADMIN — SODIUM CHLORIDE, POTASSIUM CHLORIDE, SODIUM LACTATE AND CALCIUM CHLORIDE: 600; 310; 30; 20 INJECTION, SOLUTION INTRAVENOUS at 06:57

## 2022-10-26 RX ADMIN — DEXAMETHASONE SODIUM PHOSPHATE 8 MG: 4 INJECTION, SOLUTION INTRAMUSCULAR; INTRAVENOUS at 08:29

## 2022-10-26 RX ADMIN — PHENYLEPHRINE HYDROCHLORIDE 100 MCG: 10 INJECTION INTRAVENOUS at 08:37

## 2022-10-26 RX ADMIN — GLYCOPYRROLATE 0.2 MG: 0.2 INJECTION, SOLUTION INTRAMUSCULAR; INTRAVENOUS at 10:16

## 2022-10-26 RX ADMIN — GLYCOPYRROLATE 0.2 MG: 0.2 INJECTION, SOLUTION INTRAMUSCULAR; INTRAVENOUS at 07:45

## 2022-10-26 RX ADMIN — PHENYLEPHRINE HYDROCHLORIDE 100 MCG: 10 INJECTION INTRAVENOUS at 07:52

## 2022-10-26 RX ADMIN — CEFAZOLIN 2000 MG: 10 INJECTION, POWDER, FOR SOLUTION INTRAVENOUS at 10:35

## 2022-10-26 RX ADMIN — TRANEXAMIC ACID 1000 MG: 100 INJECTION, SOLUTION INTRAVENOUS at 07:45

## 2022-10-26 RX ADMIN — PHENYLEPHRINE HYDROCHLORIDE 100 MCG: 10 INJECTION INTRAVENOUS at 08:46

## 2022-10-26 RX ADMIN — PHENYLEPHRINE HYDROCHLORIDE 50 MCG: 10 INJECTION INTRAVENOUS at 10:03

## 2022-10-26 RX ADMIN — PHENYLEPHRINE HYDROCHLORIDE 100 MCG: 10 INJECTION INTRAVENOUS at 08:05

## 2022-10-26 RX ADMIN — PROPOFOL 100 MG: 10 INJECTION, EMULSION INTRAVENOUS at 07:37

## 2022-10-26 RX ADMIN — EPHEDRINE SULFATE 5 MG: 50 INJECTION INTRAMUSCULAR; INTRAVENOUS; SUBCUTANEOUS at 09:38

## 2022-10-26 RX ADMIN — ONDANSETRON 4 MG: 4 TABLET, ORALLY DISINTEGRATING ORAL at 12:43

## 2022-10-26 RX ADMIN — ROPIVACAINE HYDROCHLORIDE 30 ML: 5 INJECTION EPIDURAL; INFILTRATION; PERINEURAL at 07:00

## 2022-10-26 RX ADMIN — GLYCOPYRROLATE 0.2 MG: 0.2 INJECTION, SOLUTION INTRAMUSCULAR; INTRAVENOUS at 08:31

## 2022-10-26 RX ADMIN — EPHEDRINE SULFATE 5 MG: 50 INJECTION INTRAMUSCULAR; INTRAVENOUS; SUBCUTANEOUS at 08:05

## 2022-10-26 RX ADMIN — MIDAZOLAM HYDROCHLORIDE 2 MG: 2 INJECTION, SOLUTION INTRAMUSCULAR; INTRAVENOUS at 07:00

## 2022-10-26 RX ADMIN — PHENYLEPHRINE HYDROCHLORIDE 50 MCG: 10 INJECTION INTRAVENOUS at 10:11

## 2022-10-26 RX ADMIN — PHENYLEPHRINE HYDROCHLORIDE 200 MCG: 10 INJECTION INTRAVENOUS at 08:27

## 2022-10-26 RX ADMIN — SODIUM CHLORIDE, POTASSIUM CHLORIDE, SODIUM LACTATE AND CALCIUM CHLORIDE: 600; 310; 30; 20 INJECTION, SOLUTION INTRAVENOUS at 08:54

## 2022-10-26 RX ADMIN — EPHEDRINE SULFATE 5 MG: 50 INJECTION INTRAMUSCULAR; INTRAVENOUS; SUBCUTANEOUS at 08:43

## 2022-10-26 RX ADMIN — EPHEDRINE SULFATE 10 MG: 50 INJECTION INTRAMUSCULAR; INTRAVENOUS; SUBCUTANEOUS at 07:44

## 2022-10-26 RX ADMIN — CEFAZOLIN 2000 MG: 10 INJECTION, POWDER, FOR SOLUTION INTRAVENOUS at 07:41

## 2022-10-26 ASSESSMENT — PAIN - FUNCTIONAL ASSESSMENT: PAIN_FUNCTIONAL_ASSESSMENT: 0-10

## 2022-10-26 ASSESSMENT — ENCOUNTER SYMPTOMS: ABDOMINAL PAIN: 1

## 2022-10-26 NOTE — TELEPHONE ENCOUNTER
Left a VM for patient to call back either my personal line or [facility] to schedule the follow up appointment with Christine Kruse or Tyler Doe on Nov 7, for post op vist # 1. Ilana Regalado

## 2022-10-26 NOTE — OP NOTE
Operative Note      Patient: Irlanda Noyola  YOB: 1955  MRN: [id number]    Date of Procedure: 10/26/2022    Pre-Op Diagnosis: Traumatic tear of right rotator cuff, initial encounter [S46.011A] Bicipital tendinitis of right shoulder [M75.21] Subacromial bursitis of right shoulder joint [M75.51]    Post-Op Diagnosis: Same       Procedure(s):  VIDEO ARTHROSCOPY RIGHT SHOULDER, REVISION  ROTATOR CUFF REPAIR, SUBACROMIAL DECOMPRESSION AND SUBPECTORAL BICEPS TENODESIS    Surgeon(s):  Ming Sherman MD    Assistant:   Surgical Assistant: Irlanda Heller    Anesthesia: General    Estimated Blood Loss (mL): Minimal    Complications: None    Specimens:   * No specimens in log *    Implants:  Implant Name Type Inv. Item Serial No.  Lot No. LRB No. Used Action   ANCHOR SUTURE FIBER SKIP SELF PUNCHING 2.6MM - [id number]  ANCHOR SUTURE FIBER SKIP SELF PUNCHING 2.6MM  [employer] [id number] Right 4 Implanted   ANCHOR SUT L19.1MM DIA5. 5MM BIOCOMPOSITE FULL THRD KNOTLESS - [id number]  ANCHOR SUT L19.1MM DIA5. 5MM BIOCOMPOSITE FULL THRD KNOTLESS  [employer] [id number] Right 3 Implanted   KIT IMPL SYS PROX TENODESIS W/ BICEPSBUTTON INSRT FIBERLOOP - [id number]  KIT IMPL SYS PROX TENODESIS W/ [employer] [id number] Right 2 Implanted         Drains: * No LDAs found *    Findings: Complete subscapularis tear - subacute appearing.  + Synovitis GH joint. Grade I softening humeral head, grade II changes central and anterior glenoid.  + Bicipital tenosynovitis and with extra-articular component with severe tendinosis and 30% tear.  + full thickness mid substance at footprint supraspinatus tear (16mm A to P) with minimal extension to anterior infraspinatus if at all.  + scar tissue throughout and subacromial bursitis.   No gross spur    Detailed Description of Procedure:     OPERATIVE INDICATIONS: Patient is a  78 y.o. male with right shoulder pain and symptoms consistent with subacromial impingement, failed prior rotator cuff repair of supraspinatus and with full thickness subscapularis tear and associated bicipital tendinitis. Please see outpatient notes for details and clinical history and previous treatment courses as applicable. With further discussion with the patient regarding continued non operative versus operative measures including risks and benefit s of each. Understanding these, the patient wished to proceed with surgery. These risks included were not limited to: damage to nerves, arteries, tendons, veins, infection, bleeding, continued pain, continued disability, need for revision surgery, chondral changes/chondral damage, loss of range of motion, damage to ligaments causing shoulder instability, painful hardware, retained broken in strumentation, retained broken hardware, venothromboembolism, hardware failure, cosmetic deformity, associated complications with anesthesia, and ultimately death. Understanding these, a signed document of consent was placed in the patients chart. OPERATIVE PROCEDURE: The patient was correctly identified in the preoperative holding area. The right upper extremity was marked by the surgeon. All questions were answered. Ultimately the patient was transported back to the operating room placed supine on the OR table with all bony prominences well padded and transitioned to a beach chair position after anesthesia induced per above. The right upper extremity was prepped and draped in the standard sterile fashion after the patient received Ancef IV x1 within 60 minutes prior to surgery as well as TXA 1gm IV x1 at induction anesthesia, a timeout was performed per protocol, and the patient had SCDs on the bilateral lower extremity for venothromboembolism prophylaxis. The right shoulder glenohumeral joint was insufflated with 60 cc of normal saline. Standard posterior working portal was made and arthroscope was introduced.  Immediate anterior working portal was made by spinal needle localization. Above findings were appreciated. Biceps tendon was tenotomized in anticipation for tenodesis given preop and intraop findings. Labrum was debrided to stable base and edge. Articular sided review and probing of the rotator cuff revealed full thickness tear of the supraspinatus however with anterior leading edge intact and with minimal extension into anterior infraspinatus and no gross tear of the  infraspinatus . A full thickness tear was visualized in the subscapularis. No loose bodies in the joint appreciated. A decision was made to repair the subscapularis full thickness tendon tear which was appreciated to be moreso subacute with its assessment with scar tissue present. After making an additional anteriorlateral portal and working through the known and visualized supraspinatus tear. After preparing the bone bed on the lesser tuberosity and with safely dissecting the subscapularis tendon away from scar tissue superior anterior and articular sided as needed and with traction suture placed into it, two double loaded anchors were placed in the lesser tuberosity as per routine. Additional time was taken given subacute nature of this tear with scar tissue to safely mobilize the tendon. Sutures were passed through the tendon and ultimately tied arthroscopically which nicely reduced the tendon to the bone. Sutures were then incorporated into a swivel lock for double row fixation of the subscapularis. Final arthroscopic images were obtained and found to have excellent tear repair with tissue quality satisfactory and with stable IR and ER to 30 degrees. All arthroscopic equipment was removed from the glenohumeral joint and scope placed into the subacromial space when viewing from posteriorly. Direct anterior lateral working portal was made after localizing with a spinal needle.  Shaver and VAPR electrocautery were introduced to perform a subacromial decompression which there was significant bursitis appreciated. With reviewing there was full thickness supraspinatus tear with prior appreciated sutures. These were debrided/removed as needed. There was scar tissue appreciated as well which was managed during subacromial decompression, which required additional time also in the setting of ensuring adequate tendon mobilization. Ultimately the tendon quality was found to be reasonable and robust for adequate revision repair without augmentation with allograft. Additional portals were created as needed. Ultimately the bone bed just at the articular edge was debrided and prepared. Two double loaded anchors were placed to allow for adequate passage of sutures through the tendon in a L type manner that allowed for tendon to bone and also side to side. Again the tissue was relatively robust without gross attrition. Sutures were tied arthroscopically and this nicely reduced the tendon to bone and side to side as well. A single limb from each knot was then incorporated into a knotless lateral row anchor. A total of two lateral row anchors were placed in this fashion, which nicely reduced the tissue to bone and provided stable fixation. The repair was probed and found to be satisfactory. There was no gross prominence of hardware and the cuff moved as one unit. All arthroscopic portals were thoroughly irrigated and closed with interrupated 3-0 nylon. Attention then turned to the subpectoral biceps tenodesis. A vertical incision was made perpendicular to the inferior palpated border of the pectoralis major tendon, roughly 1cm above and 2cm below this border. Satisfactory soft tissue planes were developed bluntly. The subpectoral space was entered after gently dissecting the deep fascial layers by blunt finger dissection. The biceps tendon was palpated and delivered through the open wound. Significant tendinosis and partial tear of the groove portion was noted. See findings. The tendon and musculotendinous junction was clearly identified. A locking #2 fiberwire suture was placed in the tendon starting at the musculotendinous junction and worked proximally. Then remnant tendon was resected and the biceps button loaded onto the suture. Blunt homans were  placed laterally and gently medially around the proximal humerus, staying directly on bone. No undue tension or pressure was placed on either retractor. A unicortical drill hole was made above the inferior border of the pectoralis tendon per routine with spade drill tip and the anterior humeral cortex was freshened with key elevator. The suture button was introduced into that hole, flipped and tensioned. This nicely tensioned the biceps tendon. A single limb of the suture was placed through the tendon and the sutures tied to secure the fixation and then sutures cut. The wound was thoroughly irrigated with copious normal saline and the retractors safely removed. The layers were allowed to develop back onto themselves. The superficial skin was closed with 3-0 monocryl and the skin closed with 3-0 stratofix in running subcuticular fashion. Wounds were cleaned and dried. The biceps incision was infiltrated with 15 cc (0.25% bupivacaine s epi). The wounds were ultimately dressed with Xeroform 4 x 4's ABDs and foam tape. The right upper extremity was placed in a sling. Patient was awakened without difficulty and transferred to the PACU in stable condition    **Greater than 50% time to include 30 minutes were required given revision setting of cuff mobilization and scar tissue management**    Condition stable     Disposition to PACU and then to home. Patient will start PT 1 mo postop given massive cuff repair in the setting of revision. Patient will follow massive RCR protocol and biceps tenodesis protocol.  Sling and NWB x 6 weeks postop      Electronically signed by Ruma White MD on 10/26/22 at 11:16 AM EDT      Electronically signed by Alfredito Barth MD on 10/26/2022 at 11:13 AM

## 2022-10-26 NOTE — PROGRESS NOTES
Pt states ready to go home, pt taken to car via wheelchair; able to trans mony to car without assist

## 2022-10-26 NOTE — DISCHARGE INSTRUCTIONS
Orthopaedic Sports Medicine  Post-Operative Discharge Instructions      Pain Medication/Management  - Narcotic electronic-scribed to pharmacy listed in EPIC (Follow prescription instructions)  - IF taking a narcotic with acetaminophen then do not take additional acetaminophen. IF not, then Tylenol (650mg) - take 1 tab every 8 hours x 1 week  - Enteric Coated Aspirin (325mg) Over The Counter - take 1 tab daily x 3 weeks with food -  Start in AM on 10/27/22  - Colace (100mg) Over The Counter - take 1 tab twice daily as needed for a stool softener  * Wean off narcotic and start Tylenol (500mg) Over The Counter - take 1-2 tabs every 8 hours as needed for pain    Nonweightbearing right arm, use sling at all times except for hygiene. Ice right shoulder. Elevate right  wrist above right elbow and open close hand to circulate blood. May start range of motion on 11/26/22 and may shower on 10/29/22, pat wounds dry air dry and then place bandaids and/or gauze/tape to cover all wounds and then return to sling use. Call PT in 24hrs to activate PT referral to start PT on 11/26/22. Check all paperwork for surgeon's follow up appointment date and time. Call office [phone] with any questions or concerns      * Day of Surgery - If you had a regional nerve block (extremity was numbed by anesthesia), it will wear off in 6-16 hours after surgery. It is recommended that you start the narcotic prescription once you get home to stay ahead of pain control even if the nerve block has not worn off yet. This will help limit severe pain from waking you up. It is also reasonable to set your alarm for every 6 hours so that you don't get behind in your pain control. * Elevation and Ice - For lower extremity surgery, elevate the operative extremity with rolled towels / pillows placed under the ankle/calf as tolerated.   Move the position of towels or pillows around every so often to limit undue pressure to the back of the calf/heel. NEVER place pillows or blankets under the knee. For shoulder and upper extremity surgery, position pillows behind your back to aid in swelling reduction and comfort with sleep, and if it applies, keep the wrist above the elbow as tolerated while in the sling. Dressing/Shower  - If you had a shoulder or knee arthroscopy, you may shower in the PM on the 3rd post-operative day. Pat the wounds dry as well and place band-aids over the smaller wounds and gauze/ACE wrap over larger wounds. Do NOT submerge the wounds in hot-tubs, bathtubs, or ocean for at least 1 month postoperatively. - If you had a surgery other than the above listed (or if otherwise stated on hospital discharge instructions), then remove the dressing in the PM on the 7th post-operative day. Shower and pat the wounds dry, then place a clean dry dressing with ACE wrap over the wound(s). Brace  - Must ALWAYS wear the brace/sling applied by the surgeon. Unless otherwise instructed, unlock the brace the day after surgery to work on range of motion as it is set on the brace/listed in discharge instruction. May remove brace for hygiene. IF the operative extremity is in a well-padded splint/cast, do NOT remove it. It will be removed at the first post-operative office visit unless otherwise directed. Mobilization   - While recovery time is needed post-operatively, please attempt to get some fresh air outside as tolerated, out of bed/chair mobilization as tolerated using assist devices as prescribed and maintain weight-bearing status as directed by the surgeon in the discharge instructions. Open/close your hand and move your ankle up and down to help circulate the blood and limit swelling. Physical Therapy  - Please contact physical therapy no later than 2 days after your surgery date to schedule your first appointment with them. The first visit with them will likely be an introductory visit between the 3rd -7th postoperative day. chair whenever possible, especially the first 24 hours after surgery. ? Use only medicines prescribed by your doctor. ? Do not drink alcohol. ? If you have a dental device to assist you while at rest, use it at all times for the first 24 hours. For patients using CPAP machines:  ? Use your CPAP machine during all periods of sleep as usual.  ? Use your CPAP machine during all periods of daytime rest while on pain medicines. ** Follow up with your primary care doctor for continued care. IF YOU DO NOT TAKE ALL OF YOUR NARCOTIC PAIN MEDICATION, please dispose of them responsibly. There are drop off boxes in the Emergency Departments 24/7 at both [facility] and [facility]. If these locations are not convenient, other options for discarding them can be found at:  [email or web address]    Hospital or office staff may NOT accept any medications to drop off in the cabinet for you. What is a Surgical Site Infection or  (SSI)? A surgical site infection (SSI) is an infection that occurs after surgery in the part of the body where the surgery took place. Most patients who have surgery do not develop an infection. However, infections can develop in about 1-3 cases for every 100 patients who have had surgery. Our goal is for you to NOT experience any complications and be completely satisfied with your care! However, some signs or symptoms to look for and report immediately to your doctor are:   1. Fever above 101 degrees    2. Redness and increasing pain around the area  where you had surgery   3. Drainage of cloudy fluid or pus coming from the surgical area    Some of the things we/ you can do to prevent SSI's are:   1. Clean hands with soap and water or an alcohol-based hand rub before and after caring for the operative area. This occurs the day of surgery and for the next 2 weeks.    2.Sometimes you receive an appropriate antibiotic within 60 minutes before your surgery or take one for several days after surgery depending on your surgeon's instructions and/or the type of surgery you are having. 3. Family and/or friends who visit you should NOT touch the surgical wound or dressings until advised by your surgeon. 4. Be sure to elevate and decrease the swelling after your surgery to help prevent infection. 5. If you are a diabetic, you need to closely monitor your blood sugar levels and report any significant increases or changes to your surgeon to help promote the healing process. PERIPHERAL NERVE BLOCK INSTRUCTIONS     Please remember while having a nerve block you are at an increased risk for [address]!! You were given a nerve block today from the anesthesiologist. Most nerve blocks last anywhere from 6-36 hours. You should start taking your pain medication before the block wears off or when you first begin feeling discomfort. It takes at least 30-60 minutes for a pain pill to take effect. Pain medications should be taken with food. Consider setting an alarm through the night to help manage your pain level so you do not wake up with too much pain. Pain medicines can cause more sedation and decrease your breathing so ONLY take as directed. If you have Sleep Apnea, you definitely need to use your C-Pap machine. What to expect after a nerve block:   Numbness, tingling- arm or leg feels heavy or asleep  Weakness or inability to move or control your arm or leg   Inability to feel temperature changes to your arm or leg  Usually the weakness wears off first, followed by the tingling or heaviness. You may notice more pain at this point and should start taking your pain meds.    If you had a shoulder block, you may experience:  Mild shortness of breath (may be relieved by sitting up in a chair or recliner)  Hoarse voice  Blurry vision  Unequal pupils  Drooping of your face (eye or lip) on the same side as the nerve block  Swelling at the injection site on the side of your neck  These side effects should resolve as the block wears off! IF you have severe or prolonged shortness of breath-  GO to the nearest Emergency Room! If you had a nerve block of your arm or leg:  Protect the arm or leg from extreme hot or cold temperatures  Protect the arm or leg from obstructing blood flow by frequent position changes- Make sure fingers/ toes stay pink and warm. Call surgeon with any changes  For leg block, get assistance walking until the block wears off, ie:  crutches, walker, support person   If you continue to feel the effects of the nerve block for longer than 72 hours- call [facility] at [phone] and ask to speak with the Anesthesiologist on call.

## 2022-10-26 NOTE — H&P
ORTHOPAEDIC SURGERY INTERVAL H&P    Candelario Mcclellan was seen in the preoperative area, where a history and physical examination was reviewed and the patient was examined by me today. There have been no significant clinical changes since the completion of the previous recorded history and physical dated 9/1/22 and 10/11/22. The surgical site was confirmed by the patient and me and the surgical site was marked. The risks, benefits, and alternatives of the proposed procedure(s) have been explained to the patient (or appropriately confirmed guardian) and understanding was verbalized. Please see outpatient notes for details. All questions were answered and a signed documented consent has been placed in the patient's chart. The patient wishes to proceed. On call to the OR. Electronically signed by: Magaly Friedman MD,10/26/2022,7:32 AM         Magaly Friedman MD       Orthopaedic Surgery-Sports Medicine      Disclaimer: This note was dictated with voice recognition software. Though review and correction are routinely performed, please contact the office/medical records for any errors requiring correction.

## 2022-10-26 NOTE — PROGRESS NOTES
Time out confirmed with Dr. Olya Chisholm RN and the patient. Interscalene johnny R shoulder for VIDEO ARTHROSCOPY RIGHT SHOULDER, REVISION OF ROTATOR CUFF REPAIR, POSSIBLE ALLOGRAFT AUGMENTATION, SUBACROMIAL DECOMPRESSION AND SUBPECTORAL BICEPS TENODESIS.

## 2022-10-26 NOTE — ANESTHESIA PROCEDURE NOTES
Peripheral Block    Patient location during procedure: pre-op  Reason for block: post-op pain management  Start time: 10/26/2022 7:00 AM  End time: 10/26/2022 7:10 AM  Staffing  Performed: anesthesiologist   Anesthesiologist: Elizabeth Hancock MD  Preanesthetic Checklist  Completed: patient identified, IV checked, site marked, risks and benefits discussed, surgical/procedural consents, equipment checked, pre-op evaluation, timeout performed, anesthesia consent given, oxygen available and monitors applied/VS acknowledged  Peripheral Block   Patient position: sitting  Prep: ChloraPrep  Provider prep: sterile gloves  Patient monitoring: continuous pulse ox  Block type: Brachial plexus  Interscalene  Laterality: right  Injection technique: single-shot  Guidance: ultrasound guided  Local infiltration: lidocaine  Infiltration strength: 1 %  Local infiltration: lidocaine  Dose: 0.5 mL    Needle   Needle type: insulated echogenic nerve stimulator needle   Needle gauge: 21 G  Needle localization: anatomical landmarks and ultrasound guidance  Needle insertion depth: 4 cm  Test dose: negative  Needle length: 10 cm  Assessment   Injection assessment: negative aspiration for heme, no paresthesia on injection, local visualized surrounding nerve on ultrasound and no intravascular symptoms  Paresthesia pain: none  Slow fractionated injection: yes  Hemodynamics: stable  Real-time US image taken/store: yes  Outcomes: uncomplicated    Medications Administered  ropivacaine (NAROPIN) injection 0.5% - Perineural, Neck   30 mL - 10/26/2022 7:00:00 AM

## 2022-10-26 NOTE — ANESTHESIA POSTPROCEDURE EVALUATION
Department of Anesthesiology  Postprocedure Note    Patient: Janeth Oseguera  MRN: [id number]  [place]: 1955  Date of evaluation: 10/26/2022      Procedure Summary     Date: 10/26/22 Room / Location: [address]    Anesthesia Start: 1579 Anesthesia Stop: 0804    Procedure: VIDEO ARTHROSCOPY RIGHT SHOULDER, REVISION  ROTATOR CUFF REPAIR, SUBACROMIAL DECOMPRESSION AND SUBPECTORAL BICEPS TENODESIS (Right: Shoulder) Diagnosis:       Traumatic tear of right rotator cuff, initial encounter      Bicipital tendinitis of right shoulder      Subacromial bursitis of right shoulder joint      (Traumatic tear of right rotator cuff, initial encounter [S46.011A] Bicipital tendinitis of right shoulder [M75.21] Subacromial bursitis of right shoulder joint [M75.51])    Surgeons: Delilah Hidalgo MD Responsible Provider: Daysi Iniguez MD    Anesthesia Type: general ASA Status: 2          Anesthesia Type: No value filed.     Familia Phase I: Familia Score: 10    Familia Phase II:        Anesthesia Post Evaluation    Patient location during evaluation: PACU  Patient participation: complete - patient participated  Level of consciousness: awake  Pain score: 0  Airway patency: patent  Nausea & Vomiting: no nausea  Complications: no  Cardiovascular status: blood pressure returned to baseline  Respiratory status: acceptable  Hydration status: euvolemic

## 2022-10-26 NOTE — ANESTHESIA PRE PROCEDURE
Department of Anesthesiology  Preprocedure Note       Name:  Franky Kovacs   Age:  78 y.o.  :  1955                                          MRN:  [id number]         Date:  10/26/2022      Surgeon: Param Nelson):  Willem Oliva MD    Procedure: Procedure(s):  VIDEO ARTHROSCOPY RIGHT SHOULDER, REVISION OF ROTATOR CUFF REPAIR, POSSIBLE ALLOGRAFT AUGMENTATION, SUBACROMIAL DECOMPRESSION AND SUBPECTORAL BICEPS TENODESIS    Medications prior to admission:   Prior to Admission medications    Medication Sig Start Date End Date Taking?  Authorizing Provider   atorvastatin (LIPITOR) 40 MG tablet TAKE 1 TABLET BY MOUTH DAILY 10/17/22   Gary Yoon MD   cyclobenzaprine (FLEXERIL) 10 MG tablet TAKE 1 TABLET BY MOUTH EVERY EVENING AS NEEDED FOR MUSCLE SPASM 10/17/22   Gary Yoon MD   gabapentin (NEURONTIN) 300 MG capsule TAKE 1 CAPSULE BY MOUTH EVERY NIGHT 10/11/22 1/9/23  Gary Yoon MD   ibuprofen (ADVIL;MOTRIN) 800 MG tablet TAKE 1 TABLET BY MOUTH EVERY 8 HOURS AS NEEDED 22   Gary Yoon MD   ezetimibe (ZETIA) 10 MG tablet TAKE 1 TABLET BY MOUTH DAILY 22   Gary Yoon MD   rizatriptan (MAXALT) 10 MG tablet TAKE UP TO 4 TABLETS BY MOUTH EVERY 7 DAYS AS NEEDED 21   Elvira Manriquez MD   clobetasol (TEMOVATE) 0.05 % ointment Apply to affected area BID PRN flares 18   Triny Hudson MD       Current medications:    Current Facility-Administered Medications   Medication Dose Route Frequency Provider Last Rate Last Admin    ceFAZolin (ANCEF) 2000 mg in dextrose 5 % 100 mL IVPB  2,000 mg IntraVENous On Call to [place] MD        lidocaine PF 1 % injection 0.3 mL  0.3 mL IntraDERmal Once PRN Kat Luna MD        lactated ringers infusion   IntraVENous Continuous Kat Luna  mL/hr at 10/26/22 0704 NoRateChange at 10/26/22 0704    sodium chloride flush 0.9 % injection 5-40 mL  5-40 mL IntraVENous 2 times per day Kat Luna MD        sodium chloride flush 0.9 % injection 5-40 mL  5-40 mL IntraVENous PRN Vaishali Amaya MD        0.9 % sodium chloride infusion   IntraVENous PRN Vaishali Amaya MD         Facility-Administered Medications Ordered in Other Encounters   Medication Dose Route Frequency Provider Last Rate Last Admin    fentaNYL (SUBLIMAZE) injection   IntraVENous PRN Samira Cantu MD   100 mcg at 10/26/22 0700    midazolam (VERSED) injection   IntraVENous PRN Samira Cantu MD   2 mg at 10/26/22 0700       Allergies:  No Known Allergies    Problem List:    Patient Active Problem List   Diagnosis Code    Insomnia G47.00    Constipation K59.00    Herniated cervical disc M50.20    Cervical radiculopathy M54.12    Lumbar radiculopathy M54.16    Rotator cuff syndrome M75.100    Depression F32. A    Eczema L30.9    Cervicalgia M54.2    Renal insufficiency N28.9    Benign prostatic hyperplasia N40.0    Hyperlipidemia E78.5    Cellulitis and abscess of upper arm and forearm [id number]    Acute kidney injury (nontraumatic) (HCC) N17.9    Cellulitis L03.90    Foot pain M79.673    Headache R51.9    Renal cyst N28.1    Diverticulosis of large intestine without hemorrhage K57.30    Acute knee pain M25.569    Acute pain of right knee M25.561    Thumb pain, left M79.645    Back pain M54.9    Elbow pain M25.529    Acute pain of right shoulder M25.511    Pre-op evaluation Z01.818       Past Medical History:        Diagnosis Date    Arthritis     Cervical disc disease 01/01/2010    Headache(784.0)     Hyperlipemia     Low back pain        Past Surgical History:        Procedure Laterality Date    ACHILLES TENDON SURGERY      APPENDECTOMY      COLONOSCOPY  06/30/2017    polyp,  diverticulosis    dr Criselda caceres    5 years    SHOULDER SURGERY  right shoulder       Social History:    Social History     Tobacco Use    Smoking status: Never    Smokeless tobacco: Never   Substance Use Topics    Alcohol use:  Yes Comment: 0-1 drink per month                                Counseling given: Not Answered      Vital Signs (Current):   Vitals:    10/21/22 1435 10/26/22 0624   BP:  (!) 164/103   Pulse:  70   Resp:  16   Temp:  97 °F (36.1 °C)   TempSrc:  Temporal   SpO2:  98%   Weight: 160 lb (72.6 kg)    Height: 5' 10\" (1.778 m)                                               BP Readings from Last 3 Encounters:   10/26/22 (!) 164/103   10/17/22 136/78   08/29/22 (!) 136/100       NPO Status: Time of last liquid consumption: 1900                        Time of last solid consumption: 1900                        Date of last liquid consumption: 10/25/22                        Date of last solid food consumption: 10/25/22    BMI:   Wt Readings from Last 3 Encounters:   10/21/22 160 lb (72.6 kg)   10/17/22 162 lb (73.5 kg)   10/11/22 160 lb (72.6 kg)     Body mass index is 22.96 kg/m².     CBC:   Lab Results   Component Value Date/Time    WBC 6.2 01/27/2016 02:14 PM    RBC 4.62 01/27/2016 02:14 PM    HGB 14.7 01/27/2016 02:14 PM    HCT 44.2 01/27/2016 02:14 PM    MCV 95.8 01/27/2016 02:14 PM    RDW 12.9 01/27/2016 02:14 PM     01/27/2016 02:14 PM       CMP:   Lab Results   Component Value Date/Time     07/14/2022 01:18 PM    K 5.3 07/14/2022 01:18 PM     07/14/2022 01:18 PM    CO2 24 07/14/2022 01:18 PM    BUN 24 07/14/2022 01:18 PM    CREATININE 1.4 07/14/2022 01:18 PM    GFRAA >60 07/14/2022 01:18 PM    GFRAA >60 04/03/2013 07:51 AM    AGRATIO 2.3 07/14/2022 01:18 PM    LABGLOM 51 07/14/2022 01:18 PM    LABGLOM 56.1 04/19/2011 07:58 AM    GLUCOSE 62 07/14/2022 01:18 PM    GLUCOSE 100 04/19/2011 07:58 AM    PROT 6.9 07/14/2022 01:18 PM    PROT 6.8 10/01/2012 08:00 AM    CALCIUM 10.2 07/14/2022 01:18 PM    BILITOT 0.5 07/14/2022 01:18 PM    ALKPHOS 106 07/14/2022 01:18 PM    AST 27 07/14/2022 01:18 PM    ALT 18 07/14/2022 01:18 PM       POC Tests: No results for input(s): POCGLU, POCNA, POCK, POCCL, POCBUN, POCHEMO, POCHCT in the last 72 hours. Coags: No results found for: PROTIME, INR, APTT    HCG (If Applicable): No results found for: PREGTESTUR, PREGSERUM, HCG, HCGQUANT     ABGs: No results found for: PHART, PO2ART, TTE0XYI, QYF8OTQ, BEART, E9THHGUV     Type & Screen (If Applicable):  No results found for: LABABO, LABRH    Drug/Infectious Status (If Applicable):  No results found for: HIV, HEPCAB    COVID-19 Screening (If Applicable): No results found for: COVID19        Anesthesia Evaluation  Patient summary reviewed and Nursing notes reviewed  Airway: Mallampati: II  TM distance: >3 FB   Neck ROM: full  Mouth opening: > = 3 FB   Dental: normal exam         Pulmonary:normal exam                               Cardiovascular:Negative CV ROS                      Neuro/Psych:   (+) neuromuscular disease:, headaches:, psychiatric history:            GI/Hepatic/Renal: Neg GI/Hepatic/Renal ROS            Endo/Other: Negative Endo/Other ROS                    Abdominal:             Vascular: negative vascular ROS. Other Findings:           Anesthesia Plan      general     ASA 2       Induction: intravenous. MIPS: Postoperative opioids intended. Anesthetic plan and risks discussed with patient and spouse. Plan discussed with CRNA.     Attending anesthesiologist reviewed and agrees with Preprocedure content      Post-op pain plan if not by surgeon: single peripheral nerve block            SUZANNE Carmichael MD   10/26/2022

## 2022-10-26 NOTE — ANESTHESIA PRE PROCEDURE
Department of Anesthesiology  Preprocedure Note       Name:  Boston Larson   Age:  78 y.o.  :  1955                                          MRN:  [id number]         Date:  10/26/2022      Surgeon: Ashley Peña):  Alex Aggarwal MD    Procedure: Procedure(s):  VIDEO ARTHROSCOPY RIGHT SHOULDER, REVISION OF ROTATOR CUFF REPAIR, POSSIBLE ALLOGRAFT AUGMENTATION, SUBACROMIAL DECOMPRESSION AND SUBPECTORAL BICEPS TENODESIS    Medications prior to admission:   Prior to Admission medications    Medication Sig Start Date End Date Taking?  Authorizing Provider   atorvastatin (LIPITOR) 40 MG tablet TAKE 1 TABLET BY MOUTH DAILY 10/17/22   Daria Ferrari MD   cyclobenzaprine (FLEXERIL) 10 MG tablet TAKE 1 TABLET BY MOUTH EVERY EVENING AS NEEDED FOR MUSCLE SPASM 10/17/22   Daria Ferrari MD   gabapentin (NEURONTIN) 300 MG capsule TAKE 1 CAPSULE BY MOUTH EVERY NIGHT 10/11/22 1/9/23  Daria Ferrari MD   ibuprofen (ADVIL;MOTRIN) 800 MG tablet TAKE 1 TABLET BY MOUTH EVERY 8 HOURS AS NEEDED 22   Daria Ferrari MD   ezetimibe (ZETIA) 10 MG tablet TAKE 1 TABLET BY MOUTH DAILY 22   Daria Ferrari MD   rizatriptan (MAXALT) 10 MG tablet TAKE UP TO 4 TABLETS BY MOUTH EVERY 7 DAYS AS NEEDED 21   Db Suarez MD   clobetasol (TEMOVATE) 0.05 % ointment Apply to affected area BID PRN flares 18   Jairon Andrade MD       Current medications:    Current Facility-Administered Medications   Medication Dose Route Frequency Provider Last Rate Last Admin    ceFAZolin (ANCEF) 2000 mg in dextrose 5 % 100 mL IVPB  2,000 mg IntraVENous On Call to Alejandrina Daniels MD        lidocaine PF 1 % injection 0.3 mL  0.3 mL IntraDERmal Once PRN Chito Morris MD        lactated ringers infusion   IntraVENous Continuous Chito Morris MD        sodium chloride flush 0.9 % injection 5-40 mL  5-40 mL IntraVENous 2 times per day Chito Morris MD        sodium chloride flush 0.9 % injection 5-40 mL  5-40 mL IntraVENous PRN Alida Bob MD        0.9 % sodium chloride infusion   IntraVENous PRN Alida Bob MD           Allergies:  No Known Allergies    Problem List:    Patient Active Problem List   Diagnosis Code    Insomnia G47.00    Constipation K59.00    Herniated cervical disc M50.20    Cervical radiculopathy M54.12    Lumbar radiculopathy M54.16    Rotator cuff syndrome M75.100    Depression F32. A    Eczema L30.9    Cervicalgia M54.2    Renal insufficiency N28.9    Benign prostatic hyperplasia N40.0    Hyperlipidemia E78.5    Cellulitis and abscess of upper arm and forearm [id number]    Acute kidney [facility] (nontraumatic) [facility]) N17.9    Cellulitis L03.90    Foot pain M79.673    Headache R51.9    Renal cyst N28.1    Diverticulosis of large intestine without hemorrhage K57.30    Acute knee pain M25.569    Acute pain of right knee M25.561    Thumb pain, left M79.645    Back pain M54.9    Elbow pain M25.529    Acute pain of right shoulder M25.511    Pre-op evaluation Z01.818       Past Medical History:        Diagnosis Date    Arthritis     Cervical disc disease 01/01/2010    Headache(784.0)     Hyperlipemia     Low back pain        Past Surgical History:        Procedure Laterality Date    ACHILLES TENDON SURGERY      APPENDECTOMY      COLONOSCOPY  06/30/2017    polyp,  diverticulosis    dr Crow Sorensen    5 years    SHOULDER SURGERY  right shoulder       Social History:    Social History     Tobacco Use    Smoking status: Never    Smokeless tobacco: Never   Substance Use Topics    Alcohol use: Yes     Comment: 0-1 drink per month                                Counseling given: Not Answered      Vital Signs (Current):   Vitals:    10/21/22 1435 10/26/22 0624   BP:  (!) 164/103   Pulse:  70   Resp:  16   Temp:  97 °F (36.1 °C)   TempSrc:  Temporal   SpO2:  98%   Weight: 160 lb (72.6 kg)    Height: 5' 10\" (1.778 m)                                               BP Readings from Last 3 Encounters:   10/26/22 (!) 164/103   10/17/22 136/78   08/29/22 (!) 136/100       NPO Status: Time of last liquid consumption: 1900                        Time of last solid consumption: 1900                        Date of last liquid consumption: 10/25/22                        Date of last solid food consumption: 10/25/22    BMI:   Wt Readings from Last 3 Encounters:   10/21/22 160 lb (72.6 kg)   10/17/22 162 lb (73.5 kg)   10/11/22 160 lb (72.6 kg)     Body mass index is 22.96 kg/m². CBC:   Lab Results   Component Value Date/Time    WBC 6.2 01/27/2016 02:14 PM    RBC 4.62 01/27/2016 02:14 PM    HGB 14.7 01/27/2016 02:14 PM    HCT 44.2 01/27/2016 02:14 PM    MCV 95.8 01/27/2016 02:14 PM    RDW 12.9 01/27/2016 02:14 PM     01/27/2016 02:14 PM       CMP:   Lab Results   Component Value Date/Time     07/14/2022 01:18 PM    K 5.3 07/14/2022 01:18 PM     07/14/2022 01:18 PM    CO2 24 07/14/2022 01:18 PM    BUN 24 07/14/2022 01:18 PM    CREATININE 1.4 07/14/2022 01:18 PM    GFRAA >60 07/14/2022 01:18 PM    GFRAA >60 04/03/2013 07:51 AM    AGRATIO 2.3 07/14/2022 01:18 PM    LABGLOM 51 07/14/2022 01:18 PM    LABGLOM 56.1 04/19/2011 07:58 AM    GLUCOSE 62 07/14/2022 01:18 PM    GLUCOSE 100 04/19/2011 07:58 AM    PROT 6.9 07/14/2022 01:18 PM    PROT 6.8 10/01/2012 08:00 AM    CALCIUM 10.2 07/14/2022 01:18 PM    BILITOT 0.5 07/14/2022 01:18 PM    ALKPHOS 106 07/14/2022 01:18 PM    AST 27 07/14/2022 01:18 PM    ALT 18 07/14/2022 01:18 PM       POC Tests: No results for input(s): POCGLU, POCNA, POCK, POCCL, POCBUN, POCHEMO, POCHCT in the last 72 hours.     Coags: No results found for: PROTIME, INR, APTT    HCG (If Applicable): No results found for: PREGTESTUR, PREGSERUM, HCG, HCGQUANT     ABGs: No results found for: PHART, PO2ART, QXG5TNY, PYW8HDF, BEART, V6IZNFSI     Type & Screen (If Applicable):  No results found for: LABABO, LABRH    Drug/Infectious Status (If Applicable):  No results found for: HIV, HEPCAB    COVID-19 Screening (If Applicable): No results found for: COVID19        Anesthesia Evaluation  Patient summary reviewed and Nursing notes reviewed  Airway:           Dental:          Pulmonary:                              Cardiovascular:Negative CV ROS                      Neuro/Psych:   (+) neuromuscular disease:, headaches:, psychiatric history:            GI/Hepatic/Renal: Neg GI/Hepatic/Renal ROS            Endo/Other: Negative Endo/Other ROS                    Abdominal:             Vascular: negative vascular ROS.          Other Findings:           Anesthesia Plan        SUZANNE Ryan MD   10/26/2022

## 2022-10-26 NOTE — PROGRESS NOTES
Wife updated in waiting room. Discharge instructions reviewed with patient and responsible adult. Discharge instructions signed and copy given with no additional questions. Patient to be discharged home with belongings.   Script sent to pharmacy per Md.

## 2022-10-27 ENCOUNTER — HOSPITAL ENCOUNTER (EMERGENCY)
Age: 67
Discharge: HOME | End: 2022-10-27
Attending: EMERGENCY MEDICINE
Payer: MEDICARE

## 2022-10-27 ENCOUNTER — TELEPHONE (OUTPATIENT)
Dept: ORTHOPEDIC SURGERY | Age: 67
End: 2022-10-27

## 2022-10-27 VITALS
RESPIRATION RATE: 14 BRPM | HEART RATE: 96 BPM | TEMPERATURE: 97.6 F | BODY MASS INDEX: 23.55 KG/M2 | DIASTOLIC BLOOD PRESSURE: 102 MMHG | WEIGHT: 159 LBS | HEIGHT: 69 IN | OXYGEN SATURATION: 99 % | SYSTOLIC BLOOD PRESSURE: 176 MMHG

## 2022-10-27 DIAGNOSIS — R33.9 URINARY RETENTION: Primary | ICD-10-CM

## 2022-10-27 DIAGNOSIS — T83.9XXA PROBLEM WITH FOLEY CATHETER, INITIAL ENCOUNTER (HCC): ICD-10-CM

## 2022-10-27 PROCEDURE — 99282 EMERGENCY DEPT VISIT SF MDM: CPT

## 2022-10-27 ASSESSMENT — PAIN - FUNCTIONAL ASSESSMENT
PAIN_FUNCTIONAL_ASSESSMENT: NONE - DENIES PAIN
PAIN_FUNCTIONAL_ASSESSMENT: NONE - DENIES PAIN

## 2022-10-27 NOTE — ED NOTES
Patients fuentes secured with clamp, leg bag emptied, patient given larger home bag for night time, instructions given on fuentes care.      Fredy Jennings RN  10/27/22 7181

## 2022-10-27 NOTE — TELEPHONE ENCOUNTER
Other PATIENT'S WIFE IS CALLING.  PATIENT WAS TAKE TO [facility] AFTER HOME FROM SURGERY BECAUSE HE COULD NOT URINATE AND A CATHETER WAS PLACED AND SHE WOULD LIKE TO KNOW IF ANYONE IN THE OFFICE CAN REMOVE IT. [address]

## 2022-10-27 NOTE — ED NOTES
Patient given discharge instructions verbal and written, patient verbalized understanding. Alert/oriented X4, Clear speech.   Patient exhibits no distress, ambulates with steady gait per self leaving unit, no further request.      Kira Mckay RN  10/27/22 0021

## 2022-10-27 NOTE — ED PROVIDER NOTES
[facility] PROVIDER NOTE    Patient Identification  Pt Name: Yao Roy  MRN: [id number]  [place] 1955  Date of evaluation: 10/26/2022  Provider: Michell Hicks MD  PCP: Hilton Parson MD    [facility]  Yao Roy is a 78 y.o. male who presents to the ED for  urinary retention. Had outpatient rotator cuff repair this morning and has not urinated since 7am this morning. .     No other complaints, modifying factors or associated symptoms. Nursing notes reviewed. Allergies: Patient has no known allergies. Past medical history:   Past Medical History:   Diagnosis Date    Arthritis     Cervical disc disease 01/01/2010    Headache(784.0)     Hyperlipemia     Low back pain        Past surgical history:   Past Surgical History:   Procedure Laterality Date    ACHILLES TENDON SURGERY      APPENDECTOMY      COLONOSCOPY  06/30/2017    polyp,  diverticulosis    dr Delgadillo Pac    5 years    SHOULDER ARTHROSCOPY Right 10/26/2022    VIDEO ARTHROSCOPY RIGHT SHOULDER, REVISION  ROTATOR CUFF REPAIR, SUBACROMIAL DECOMPRESSION AND SUBPECTORAL BICEPS TENODESIS performed by William Wiggins MD at [address] [place] [address]  right shoulder       Home medications:   Discharge Medication List as of 10/26/2022  8:58 PM        CONTINUE these medications which have NOT CHANGED    Details   oxyCODONE (ROXICODONE) 5 MG immediate release tablet Take 1 tablet by mouth every 6 hours as needed for Pain for up to 5 days. Intended supply: 5 days.  Take lowest dose possible to manage pain, Disp-20 tablet, R-0Normal      atorvastatin (LIPITOR) 40 MG tablet TAKE 1 TABLET BY MOUTH DAILY, Disp-90 tablet, R-3Normal      cyclobenzaprine (FLEXERIL) 10 MG tablet TAKE 1 TABLET BY MOUTH EVERY EVENING AS NEEDED FOR MUSCLE SPASM, Disp-30 tablet, R-0Normal      gabapentin (NEURONTIN) 300 MG capsule TAKE 1 CAPSULE BY MOUTH EVERY NIGHT, Disp-90 capsule, R-0Normal      ibuprofen (ADVIL;MOTRIN) 800 MG tablet TAKE 1 TABLET BY MOUTH EVERY 8 HOURS AS NEEDED, Disp-90 tablet, R-0Normal      ezetimibe (ZETIA) 10 MG tablet TAKE 1 TABLET BY MOUTH DAILY, Disp-90 tablet, R-0Normal      rizatriptan (MAXALT) 10 MG tablet TAKE UP TO 4 TABLETS BY MOUTH EVERY 7 DAYS AS NEEDED, Disp-30 tablet, R-0Normal      clobetasol (TEMOVATE) 0.05 % ointment Apply to affected area BID PRN flares, Disp-15 g, R-2, Normal             Social history:  reports that he has never smoked. He has never used smokeless tobacco. He reports current alcohol use. He reports that he does not use drugs. Family history:    Family History   Problem Relation Age of Onset    Cancer Mother     Cancer Father        REVIEW OF SYSTEMS  Review of Systems   Constitutional:  Negative for chills and fever. Gastrointestinal:  Positive for abdominal pain (pressure). PHYSICAL EXAM  BP (!) 155/88   Pulse 78   Temp 97.7 °F (36.5 °C) (Oral)   Resp 14   Ht 5' 10\" (1.778 m)   Wt 166 lb (75.3 kg)   SpO2 100%   BMI 23.82 kg/m²     Physical Exam  Vitals and nursing note reviewed. Constitutional:       Appearance: Normal appearance. He is well-developed. He is not ill-appearing. HENT:      Head: Normocephalic and atraumatic. Right Ear: External ear normal.      Left Ear: External ear normal.      Nose: Nose normal.   Eyes:      General: No scleral icterus. Right eye: No discharge. Left eye: No discharge. Conjunctiva/sclera: Conjunctivae normal.   Cardiovascular:      Rate and Rhythm: Normal rate and regular rhythm. Pulmonary:      Effort: Pulmonary effort is normal. No respiratory distress. Breath sounds: No wheezing or rales. Abdominal:      General: Bowel sounds are normal.   Musculoskeletal:      Cervical back: Neck supple. Skin:     Coloration: Skin is not pale. Neurological:      Mental Status: He is alert.    Psychiatric:         Mood and Affect: Mood normal.         Behavior: Behavior normal. PROCEDURES        EKG:    RADIOLOGY  No orders to display          LABS  Labs Reviewed   URINALYSIS WITH REFLEX TO CULTURE - Abnormal; Notable for the following components:       Result Value    Glucose, Ur 250 (*)     Ketones, Urine TRACE (*)     All other components within normal limits       ED COURSE/MDM  Patient seen and evaluated. Patient had general anesthesia today. Post surgery he had been unable to urinate. Garibay catheter inserted by nursing staff. Patient drained about 500 cc. No urinary tract infection. Patient reassured. He will be discharged with the Garibay catheter in place. I do feel that Americo Carpenter can be safely discharged home due to being low risk for serious or life-threatening conditions. Outpatient follow-up  recommended. Return instructions discussed. Patient and or family expresses understanding and is in agreement with plan. Is this patient to be included in the SEP-1 Core Measure due to severe sepsis or septic shock? No   Exclusion criteria - the patient is NOT to be included for SEP-1 Core Measure due to: Infection is not suspected       Patient Referrals: [facility] [address] [place] [address]  [phone]  Schedule an appointment as soon as possible for a visit       Discharge Medications:  Discharge Medication List as of 10/26/2022  8:58 PM          FINAL IMPRESSION  1. Acute urinary retention        Blood pressure (!) 155/88, pulse 78, temperature 97.7 °F (36.5 °C), temperature source Oral, resp. rate 14, height 5' 10\" (1.778 m), weight 166 lb (75.3 kg), SpO2 100 %. DISPOSITION  DISPOSITION Decision To Discharge 10/26/2022 08:56:22 PM        Electronically signed by: Ana Lee M.D. I am the primary clinician of record. This chart was generated using the [address] [place] [address] Vadd[employer]ation system. I created this record but it may contain dictation errors given the limitations of this technology.        Rhonda Portillo Linnette Mustafa MD  10/27/22 9832

## 2022-10-27 NOTE — ED PROVIDER NOTES
[address] [address] ENCOUNTER      Pt Name: Prudence Zacarias  MRN: [id number]  [place] 1955  Date of evaluation: 10/27/2022  Provider: Bob Perkins MD    CHIEF COMPLAINT       Chief Complaint   Patient presents with    Other     Wants Garibay cath removed         HISTORY OF PRESENT ILLNESS   (Location/Symptom, Timing/Onset, Context/Setting, Quality, Duration, Modifying Factors, Severity)  Note limiting factors. Prudence Zacarias is a 78 y.o. male with past medical history of hyperlipidemia, headaches, arthritis currently 1 day postop from right rotator cuff surgery here today with problems with his Garibay catheter that was placed yesterday. Patient is 1 day postop from right rotator cuff surgery complicated by postoperative urinary retention. He was seen last night diagnosed with this and a Garibay catheter was placed. His work-up was otherwise unremarkable. He was discharged with outpatient urology follow-up but throughout the day has had some difficulty with the catheter. He states the bag frequently pulls down when full and is very uncomfortable for him. He has had regular drainage but states he is just having discomfort at the end of his penis where the Garibay catheter itself is. No bleeding. No abdominal pain. No nausea or vomiting    HPI    Nursing Notes were reviewed. REVIEW OF SYSTEMS    (2-9 systems for level 4, 10 or more for level 5)     Review of Systems    Please see HPI for pertinent positive and negative review of system findings. A full 10 system ROS was performed and otherwise negative.         PAST MEDICAL HISTORY     Past Medical History:   Diagnosis Date    Arthritis     Cervical disc disease 01/01/2010    Headache(784.0)     Hyperlipemia     Low back pain          SURGICAL HISTORY       Past Surgical History:   Procedure Laterality Date    ACHILLES TENDON SURGERY      APPENDECTOMY      COLONOSCOPY  06/30/2017    polyp,  diverticulosis     real caceres    5 years    SHOULDER ARTHROSCOPY Right 10/26/2022    VIDEO ARTHROSCOPY RIGHT SHOULDER, REVISION  ROTATOR CUFF REPAIR, SUBACROMIAL DECOMPRESSION AND SUBPECTORAL BICEPS TENODESIS performed by Bobo العلي MD at [address] [place] [address]  right shoulder         CURRENT MEDICATIONS       Previous Medications    ATORVASTATIN (LIPITOR) 40 MG TABLET    TAKE 1 TABLET BY MOUTH DAILY    CLOBETASOL (TEMOVATE) 0.05 % OINTMENT    Apply to affected area BID PRN flares    CYCLOBENZAPRINE (FLEXERIL) 10 MG TABLET    TAKE 1 TABLET BY MOUTH EVERY EVENING AS NEEDED FOR MUSCLE SPASM    EZETIMIBE (ZETIA) 10 MG TABLET    TAKE 1 TABLET BY MOUTH DAILY    GABAPENTIN (NEURONTIN) 300 MG CAPSULE    TAKE 1 CAPSULE BY MOUTH EVERY NIGHT    IBUPROFEN (ADVIL;MOTRIN) 800 MG TABLET    TAKE 1 TABLET BY MOUTH EVERY 8 HOURS AS NEEDED    OXYCODONE (ROXICODONE) 5 MG IMMEDIATE RELEASE TABLET    Take 1 tablet by mouth every 6 hours as needed for Pain for up to 5 days. Intended supply: 5 days. Take lowest dose possible to manage pain    RIZATRIPTAN (MAXALT) 10 MG TABLET    TAKE UP TO 4 TABLETS BY MOUTH EVERY 7 DAYS AS NEEDED       ALLERGIES     Patient has no known allergies.     FAMILY HISTORY       Family History   Problem Relation Age of Onset    Cancer Mother     Cancer Father           SOCIAL HISTORY       Social History     Socioeconomic History    Marital status:    Tobacco Use    Smoking status: Never    Smokeless tobacco: Never   Vaping Use    Vaping Use: Never used   Substance and Sexual Activity    Alcohol use: Yes     Comment: 0-1 drink per month    Drug use: No    Sexual activity: Yes     Partners: Female     Social Determinants of Health     Financial Resource Strain: Low Risk     Difficulty of Paying Living Expenses: Not hard at all   Food Insecurity: No Food Insecurity    Worried About [facility] in the Last Year: Never true    Ran Out of Food in the Last Year: Never true       SCREENINGS    Cascade Coma Scale  Eye Opening: Spontaneous  Best Verbal Response: Oriented  Best Motor Response: Obeys commands  Barry Coma Scale Score: 15          PHYSICAL EXAM    (up to 7 for level 4, 8 or more for level 5)     ED Triage Vitals [10/27/22 1547]   BP Temp Temp src Heart Rate Resp SpO2 Height Weight   (!) 176/102 97.6 °F (36.4 °C) -- 96 14 99 % 5' 9\" (1.753 m) 159 lb (72.1 kg)       Physical Exam      General appearance:  Cooperative. No acute distress. Skin:  Warm. Dry. Ears, nose, mouth and throat:  Oral mucosa moist,  Perfusion:  intact  Respiratory: Respirations nonlabored. : Normal-appearing external male genitalia with Garibay catheter in place. Neurological:  Alert. Moves all extremities spontaneously  Musculoskeletal:   Normal ROM, no deformities. Right shoulder in a sling          Psychiatric:  Normal mood      DIAGNOSTIC RESULTS       Labs Reviewed - No data to display    Interpretation per the Radiologist below, if obtained/available at the time of this note:    No orders to display       All other labs/imaging were within normal range or not returned as of this dictation. EMERGENCY DEPARTMENT COURSE and DIFFERENTIAL DIAGNOSIS/MDM:   Vitals:    Vitals:    10/27/22 1547   BP: (!) 176/102   Pulse: 96   Resp: 14   Temp: 97.6 °F (36.4 °C)   SpO2: 99%   Weight: 159 lb (72.1 kg)   Height: 5' 9\" (1.753 m)       Patient presents with uncomfortable Garibay catheter. It was noted that the catheter itself was connected directly to the bag with fairly limited slack. He is uncomfortable because is frequently pulling on his penis. I explained that he is only had the catheter in for 12 hours and that this likely would need to remain in place somewhat longer especially given his underlying history of BPH with urinary retention. I explained there was a high rate of failure with need to return should we take the catheter out but that I would do it if he was insistent upon it.   He ultimately felt that he would be much happier if we could add tubing to the catheter to extend the length of the overall system and allow him to have the bag lower on his leg. This was performed here and ultimately feel he may be discharged home. Catheter appears to be working appropriately    Manju Carey M.D., am the primary clinician of record. MDM      CONSULTS     None    Critical Care:   None    REASSESSMENT          PROCEDURE     Unless otherwise noted below, none     Procedures      FINAL IMPRESSION      1. Urinary retention    2. Problem with Garibay catheter, initial encounter [facility])            DISPOSITION/PLAN   DISPOSITION Decision To Discharge 10/27/2022 04:09:20 PM        PATIENT REFERRED TO:  [facility]  [address] [address]  [phone]  Schedule an appointment as soon as possible for a visit       DISCHARGE MEDICATIONS:  New Prescriptions    No medications on file     Controlled Substances Monitoring:     RX Monitoring 7/16/2020   Periodic Controlled Substance Monitoring No signs of potential drug abuse or diversion identified.        (Please note that portions of this note were completed with a voice recognition program.  Efforts were made to edit the dictations but occasionally words are mis-transcribed.)    Josep Paris MD (electronically signed)  Attending Emergency Physician            Akila Amos MD  10/27/22 1600

## 2022-10-28 DIAGNOSIS — N99.89 POSTOPERATIVE URINARY RETENTION: Primary | ICD-10-CM

## 2022-10-28 DIAGNOSIS — R33.8 POSTOPERATIVE URINARY RETENTION: Primary | ICD-10-CM

## 2022-10-28 NOTE — TELEPHONE ENCOUNTER
Spoken with the patients wife. She states that the ER would not take the catheter out no less than 24hrs post insertion. She then stated that the ER would take it out after the time period had lapsed and the patient was voiding normally. Patient is voiding normally.  A new referral was placed and the wife was advised to revisit the [facility] ER for removal and to keep the follow up with the UROLOGY group for post catheter removal. David Chicas

## 2022-11-01 ENCOUNTER — TELEPHONE (OUTPATIENT)
Dept: ORTHOPEDIC SURGERY | Age: 67
End: 2022-11-01

## 2022-11-01 NOTE — TELEPHONE ENCOUNTER
General Question     Subject: Patient would like to know if he can get some medication so he can sleep. Just need a call back. Please Advise.   Patient Julio C Ayala  Contact Number: [phone]

## 2022-11-01 NOTE — TELEPHONE ENCOUNTER
Spoke with the patients wife. I advised that they should contact the PCP for medications to help sleep. The wife stated they have tried OTC sleep help and the PCP has prescribed flexeril. They were seeking guidance for a stronger medication. Advised again to contact PCP for relief.      Pastora Redd

## 2022-11-07 ENCOUNTER — OFFICE VISIT (OUTPATIENT)
Dept: ORTHOPEDIC SURGERY | Age: 67
End: 2022-11-07

## 2022-11-07 VITALS — HEIGHT: 69 IN | WEIGHT: 159 LBS | BODY MASS INDEX: 23.55 KG/M2

## 2022-11-07 DIAGNOSIS — S46.011A TRAUMATIC COMPLETE TEAR OF RIGHT ROTATOR CUFF, INITIAL ENCOUNTER: Primary | ICD-10-CM

## 2022-11-07 PROCEDURE — 99024 POSTOP FOLLOW-UP VISIT: CPT | Performed by: PHYSICIAN ASSISTANT

## 2022-11-07 NOTE — PROGRESS NOTES
Dr Seth Dyer      Date /Time 11/7/2022       2:32 PM EST  Name Martha Alejandro             1955   Location  [place]  MRN [id number]                Chief Complaint   Patient presents with    Follow-up     CC 1st PO Right Shoulder (CC Dr Ted Gayle)         History of Present Illness      Martha Alejandro is a 78 y.o. male is here for post-op visit after RIGHT        Patient presents to the office today for follow-up visit. He is seen in courtesy for Dr. Willis Greenwood who is out of the office. Patient is approximately 2 weeks status post left massive rotator cuff repair and biceps tenodesis. He is currently doing well. Pain controlled. Physical Exam    Based off 1997 Exam Criteria    Ht 5' 9\" (1.753 m)   Wt 159 lb (72.1 kg)   BMI 23.48 kg/m²      Constitutional:       General: He is not in acute distress. Appearance: Normal appearance. RIGHT Shoulder: incision clean, intact, healing appropriately. No surrounding  erythema or fluctuance. Neuro intact distal. No evidence of DVT. Imaging         Assessment and Plan    Chuy Aviles was seen today for follow-up. Diagnoses and all orders for this visit:    Traumatic complete tear of right rotator cuff, initial encounter  -     [facility]        Patient sutures are removed and Steri-Strips are applied with Mastisol. He is educated on soft tissue massage but not directly over the incision for at least 2 weeks. In addition he will start therapy but not until 4 weeks postop. He is also instructed to not leave the elbow unsupported to help support the biceps tenodesis. He can remove the sling to shower and change his close. At this time he can participate in gentle range of motion exercises of the elbow to prevent stiffness.   He will follow-up with Dr. Willis Greenwood in 1 month or sooner if problems arise peer    Electronically signed by Clark Clark PA-C on 11/7/2022 at 2:32 PM  This dictation was generated by voice recognition computer software. Although all attempts are made to edit the dictation for accuracy, there may be errors in the transcription that are not intended.

## 2022-11-13 PROBLEM — Z01.818 PRE-OP EVALUATION: Status: RESOLVED | Noted: 2022-10-14 | Resolved: 2022-11-13

## 2022-11-28 ENCOUNTER — HOSPITAL ENCOUNTER (OUTPATIENT)
Dept: PHYSICAL THERAPY | Age: 67
Setting detail: THERAPIES SERIES
Discharge: HOME | End: 2022-11-28
Payer: MEDICARE

## 2022-11-28 PROCEDURE — 97161 PT EVAL LOW COMPLEX 20 MIN: CPT | Performed by: PHYSICAL THERAPIST

## 2022-11-28 PROCEDURE — 97110 THERAPEUTIC EXERCISES: CPT | Performed by: PHYSICAL THERAPIST

## 2022-11-28 NOTE — PLAN OF CARE
[facility], [address]  Phone: [phone]  Fax [phone]     Janet Ortega    Dear Dr. Kavon Bates ,    We had the pleasure of evaluating the following patient for physical therapy services at [address] [place] [address]. A summary of our findings can be found in the initial assessment below. This includes our plan of care. If you have any questions or concerns regarding these findings, please do not hesitate to contact me at the office phone number checked above.   Thank you for the referral.       Physician Signature:_______________________________Date:__________________  By signing above (or electronic signature), therapists plan is approved by physician    Patient: Brett Garcia   : 1955   MRN: [id number]    Referring Physician: Gurmeet Batres MD/Pedro Rivero        Evaluation Date: 2022        Date of Referral:22    Insurance: [facility] Medicare BMN no auth    Next MD appt: scheduled:  none scheduled, to be in 4 weeks    Medical Diagnosis:  S46.011A (ICD-10-CM) - Traumatic complete tear of right rotator cuff, initial encounter    Treatment Diagnosis:  R  massive RCR and bicep tenodesis DOSx 10/26/22  R shoulder pain M25.511  R shoulder stiffness M215.611      Precautions/ Contra-indications: see below  Post operative per protocol:  Patient will wear an abduction pillow brace/sling for 6 weeks postop and be NWB for 6 weeks     Unless otherwise specified, do not begin PT until patient has seen M.D. for 2 weeks postop visit          Phase I:     Weeks 0-4      May remove sling for gentle pendulum exercises 2-3 times per day      Elbows/hand gripping and range of motion exercises: Perform 4-6 times per day      Cryotherapy as needed      Rhythmic stabilization drills      Continue all isometric contractions and use of cryotherapy as needed      Initiate seated scapular isometrics      Screen posture      May begin joint mobilizations grade 1 and 2 for pain relief/relaxation          Weeks 4-5 (begin 11/28/22)     PROM/AAROM-- flexion to 90°, abduction to 90°, external rotation 30°, internal rotation 30°, extension 30°. (ER/IR in scapular plane, Flexion/Extension at 90° flexion in scapular plane)      ER/IR in scapular plane and at 90° adduction      Initiate strengthening using exercise tubing at 0° abduction (use towel roll under arm)      Initiate manual resistance ER in supine in scapular plane (light resistance)      Progress scapular strengthening      Initiate prone rowing with arm at 30° of adduction to neutral arm position      Initiate prone shoulder extension with the elbow flexed to 90°      Continue use of ice as needed.  May use heat prior to range of motion exercises      Rhythmic stabilization exercises (flexion at 45°, 90°, 100° and ER/IR at multiple angles)          Weeks 5-6      PROM/AAROM-flexion to 120°, abduction to 120°, external rotation to degrees, internal rotation 45°, extension 30      Joint mobilizations: Gentle scapular/glenohumeral joint mobilization as indicated to regain the full PROM      AAR in stretching exercises to gain full motion      Shoulder flexion      ER at 90° abduction      Initiate AROM exercises      Shoulder flexion in scapular plane to 90° of flexion      Shoulder abduction to 90°      Progress isotonic strengthening exercise program      IR/ER tubing (towel under arm)      Side lying ER (towel under arm)       Prone rowing at 45° adduction      Prone horizontal abduction (flexed elbow) at 90° adduction      Biceps curls isotonic slipped very light resistance      Slowly progressive strengthening to prevent i inflammation of tendon     C-SSRS Triggered by Intake questionnaire (Past 2 wk assessment):  1/4 intake  [x] No, Questionnaire did not trigger screening.   [] Yes, Patient intake triggered further evaluation      [] C-SSRS Screening completed  [] PCP notified via Plan of Care  [] Emergency services notified     Latex Allergy:  [x]NO      []YES  Preferred Language for Healthcare:   [x]English       []other:    SUBJECTIVE: Patient stated complaint: Pt initially stepped off a scaffolding, then in 8/2022 he was breaking up a dog fight and it was the catalyst for the sx. Pt was having issues sleeping immediately after surgery but he is taking sleep medicine. Relevant Medical History: HAs, LBP, OA, hyper lip, cx DDD, achilles repair. Pt is right hand dominant. Functional Disability Index: FOTO 40/100     5'9  159 lbs    Pain Scale:  2/10  Easing factors:  Ibuprofen, ice occasionally   Provocative factors: moving     Type: []Constant   []Intermittent  []Radiating []Localized []other:     Numbness/Tingling:  yes B feet    Occupation/School:  retired    Living Status/Prior Level of Function: Independent with ADLs and IADLs, pt lives in a 80+ year old house he is stripping down to wood and repainting. Also redoing a vacation home. OBJECTIVE:     CERV ROM Screen, all ROM decreased    Cervical Flexion     Cervical Extension     Cervical SB     Cervical rotation          PROM Left Right   Shoulder Flex  30   Shoulder Abd     Shoulder ER  0 @0 ABD   Shoulder IR                    Strength  Left Right   Shoulder Flex     Shoulder Scap     Shoulder ER     Shoulder IR                 Reflexes/Sensation:  NT this visit   [x]Dermatomes/Myotomes intact    [x]Reflexes equal and normal bilaterally   []Other:    Joint mobility: TBA   []Normal    []Hypo   []Hyper    Palpation: RUT increased tone     Functional Mobility/Transfers:      Posture:  R shoulder elevated, humeral head forward     Bandages/Dressings/Incisions: all portals and axillary incision healed well. No bruising visible. Gait: (include devices/WB status): WNL    Orthopedic Special Tests: NA                       [x] Patient history, allergies, meds reviewed.  Medical chart reviewed. See intake form. Review Of Systems (ROS):  [x]Performed Review of systems (Integumentary, CardioPulmonary, Neurological) by intake and observation. Intake form has been scanned into medical record. Patient has been instructed to contact their primary care physician regarding ROS issues if not already being addressed at this time. Co-morbidities/Complexities (which will affect course of rehabilitation):   []None           Arthritic conditions   []Rheumatoid arthritis (M05.9)  []Osteoarthritis (M19.91)   Cardiovascular conditions   []Hypertension (I10)  [x]Hyperlipidemia (E78.5)  []Angina pectoris (I20)  []Atherosclerosis (I70)   Musculoskeletal conditions   [x]Disc pathology   []Congenital spine pathologies   []Prior surgical intervention  []Osteoporosis (M81.8)  []Osteopenia (M85.8)   Endocrine conditions   []Hypothyroid (E03.9)  []Hyperthyroid Gastrointestinal conditions   []Constipation (E09.69)   Metabolic conditions   []Morbid obesity (E66.01)  []Diabetes type 1(E10.65) or 2 (E11.65)   [x]Neuropathy (G60.9)     Pulmonary conditions   []Asthma (J45)  []Coughing   []COPD (J44.9)   Psychological Disorders  [x]Anxiety (F41.9)  []Depression (F32.9)   []Other:   [x]Other:  HAs        Barriers to/and or personal factors that will affect rehab potential:              []Age  []Sex              []Motivation/Lack of Motivation                        [x]Co-Morbidities              []Cognitive Function, education/learning barriers              []Environmental, home barriers              []profession/work barriers  []past PT/medical experience  []other:  Justification: multiple co morbidities     Falls Risk Assessment (30 days): Pt indicates no hx of falls or balance issues over the past year. There is no apparent indication of need for fall reduction program during clinic observation. [x] Falls Risk assessed and no intervention required.   [] Falls Risk assessed and Patient requires intervention due to Deficit - <45 degrees   []Signs/symptoms consistent with facet dysfunction of cervical/thoracic spine    []Signs/symptoms consistent with pathology which may benefit from Dry needling     []other:     Prognosis/Rehab Potential:      []Excellent   [x]Good    []Fair   []Poor    Tolerance of evaluation/treatment:    []Excellent   [x]Good    [x]Fair   []Poor  Physical Therapy Evaluation Complexity Justification  [x] A history of present problem with:  [] no personal factors and/or comorbidities that impact the plan of care;  [x]1-2 personal factors and/or comorbidities that impact the plan of care  []3 personal factors and/or comorbidities that impact the plan of care  [x] An examination of body systems using standardized tests and measures addressing any of the following: body structures and functions (impairments), activity limitations, and/or participation restrictions;:  [x] a total of 1-2 or more elements   [] a total of 3 or more elements   [] a total of 4 or more elements   [x] A clinical presentation with:  [x] stable and/or uncomplicated characteristics   [] evolving clinical presentation with changing characteristics  [] unstable and unpredictable characteristics;   [x] Clinical decision making of [x] low, [] moderate, [] high complexity using standardized patient assessment instrument and/or measurable assessment of functional outcome. [x] EVAL (LOW) 15678 (typically 20 minutes face-to-face)  [] EVAL (MOD) 92947 (typically 30 minutes face-to-face)  [] EVAL (HIGH) 83199 (typically 45 minutes face-to-face)  [] RE-EVAL       PLAN:  Frequency/Duration:  2 days per week for 12 Weeks:  INTERVENTIONS:  [x] Therapeutic exercise including: strength training, ROM, for Upper extremity and core   [x]  NMR activation and proprioception for UE, scap and Core   [x] Manual therapy as indicated for shoulder, scapula and spine to include: Dry Needling/IASTM, STM, PROM, Gr I-IV mobilizations, manipulation.    [x] Modalities as needed that may include: thermal agents, E-stim, Biofeedback, US, iontophoresis as indicated  [x] Patient education on joint protection, postural re-education, activity modification, progression of HEP. HEP instruction: Viktor Andrade (see scanned forms)  Access Code: [email or web address]  URL: [email or web address]  Date: 11/28/2022  Prepared by: Julián Butler    GOALS:  Patient stated goal: to get mobility back  [] Progressing: [] Met: [] Not Met: [] Adjusted    Therapist goals for Patient:   Short Term Goals: To be achieved in: 2 weeks  1. Independent in HEP and progression per patient tolerance, in order to prevent re-injury. [] Progressing: [] Met: [] Not Met: [] Adjusted  2. Patient will have a decrease in pain to facilitate improvement in movement, function, and ADLs as indicated by Functional Deficits. [] Progressing: [] Met: [] Not Met: [] Adjusted    Long Term Goals: To be achieved in: 12 weeks      1. FOTO score to be equal or greater to the predicted score (65/100)  to assist with reaching prior level of function. [] Progressing: [] Met: [] Not Met: [] Adjusted   2. Patient will demonstrate increased AROM to equal the opposite side bilaterally to allow for reaching into upper kitchen cabinets as indicated by patients Functional Deficits. [] Progressing: [] Met: [] Not Met: [] Adjusted   3. Patient will demonstrate an increase in strength to 4+/5 to allow for lifting paint buckets less than 5 lbs  as indicated by patients Functional Deficits. [] Progressing: [] Met: [] Not Met: [] Adjusted   4. Patient will return to all transfers, work activities, and functional activities without increased symptoms or restriction, specifically stripping paint off siding   [] Progressing: [] Met: [] Not Met: [] Adjusted   5. (Pt specific functional or activity goal)Pt will be able to sleep w/o sleep aids or waking from shoulder pain. [] Progressing: [] Met: [] Not Met: [] Adjusted  6.  Pt will demonstrate pain decreased by 50%  (0-1/10) with all ADLS.   [] Progressing: [] Met: [] Not Met: [] Adjusted         Electronically signed by:  Rohan Koengi, [id number] [facility]

## 2022-11-28 NOTE — FLOWSHEET NOTE
[facility], [address]  Phone: [phone]  Fax [phone]  :  Physical Therapy Treatment Note/ Progress Report:           Date:  2022    Patient Name:  Brett Garcia    :  1955  MRN: [id number]    Referring Physician: Gurmeet Batres MD/Pedro Rivero                                                    Evaluation Date: 2022                                         Date of Referral:22     Insurance: [facility] Medicare BMN no auth     Next MD appt: scheduled:  none scheduled, to be in 4 weeks     Medical Diagnosis:  S46.011A (ICD-10-CM) - Traumatic complete tear of right rotator cuff, initial encounter     Treatment Diagnosis:  R  massive RCR and bicep tenodesis DOSx 10/26/22  R shoulder pain M25.511  R shoulder stiffness M215.611        Precautions/ Contra-indications: see below  Post operative per protocol:  Patient will wear an abduction pillow brace/sling for 6 weeks postop and be NWB for 6 weeks      Unless otherwise specified, do not begin PT until patient has seen M.D. for 2 weeks postop visit       Has the plan of care been signed (Y/N):        []  Yes  [x]  No    Progress report will be due (10 Rx or 30 days ):        Recertification will be due (POC Duration  / 90 days ): 23     Is this a Progress Report:     []  Yes  [x]  No      If Yes:  Date Range for reporting period:  Beginnin22  Ending:              Visit # Date Range Insurance Allowable Requires auth   IN PERSON 1 22- BMN    [x]no        []yes:   TELEHEALTH       TOTAL              CX/NS       Next PT appt: 22                PT Practice Pattern:D  Co morbidities:1-2  Surgical:R RCR and bicep tenodesis massive repair  DOS 10/26/22    INITIAL VISIT 22 CURRENT VISIT    PAIN 0-10/10 2/10    FUNCTIONAL SCALE FOTO 40/100    PROM SFLX 30     SER @ 0 ABD 0                            STRENGHT (MMT)                                              Latex Allergy/Pacemaker/Adhesive allergy:  [x]NO      []YES     RESTRICTIONS/PRECAUTIONS:   Patient will wear an abduction pillow brace/sling for 6 weeks postop and be NWB for 6 weeks     Unless otherwise specified, do not begin PT until patient has seen M.DHillary for 2 weeks postop visit     Phase I:     Weeks 0-4      May remove sling for gentle pendulum exercises 2-3 times per day      Elbows/hand gripping and range of motion exercises: Perform 4-6 times per day      Cryotherapy as needed      Rhythmic stabilization drills      Continue all isometric contractions and use of cryotherapy as needed      Initiate seated scapular isometrics      Screen posture      May begin joint mobilizations grade 1 and 2 for pain relief/relaxation          Weeks 4-5 (begin 11/28/22)     PROM/AAROM-- flexion to 90°, abduction to 90°, external rotation 30°, internal rotation 30°, extension 30°. (ER/IR in scapular plane, Flexion/Extension at 90° flexion in scapular plane)      ER/IR in scapular plane and at 90° adduction      Initiate strengthening using exercise tubing at 0° abduction (use towel roll under arm)      Initiate manual resistance ER in supine in scapular plane (light resistance)      Progress scapular strengthening      Initiate prone rowing with arm at 30° of adduction to neutral arm position      Initiate prone shoulder extension with the elbow flexed to 90°      Continue use of ice as needed.  May use heat prior to range of motion exercises      Rhythmic stabilization exercises (flexion at 45°, 90°, 100° and ER/IR at multiple angles)          Weeks 5-6      PROM/AAROM-flexion to 120°, abduction to 120°, external rotation to degrees, internal rotation 45°, extension 30      Joint mobilizations: Gentle scapular/glenohumeral joint mobilization as indicated to regain the full PROM      AAR in stretching exercises to gain full motion      Shoulder flexion      ER at 90° abduction      Initiate AROM exercises      Shoulder flexion in scapular plane to 90° of flexion      Shoulder abduction to 90°      Progress isotonic strengthening exercise program      IR/ER tubing (towel under arm)      Side lying ER (towel under arm)       Prone rowing at 45° adduction      Prone horizontal abduction (flexed elbow) at 90° adduction      Biceps curls isotonic slipped very light resistance      Slowly progressive strengthening to prevent i inflammation of tendon            SUBJECTIVE:  Pain level:  0-2/10 See eval    OBJECTIVE: See eval  Observation:             Exercises/Interventions:     HEP:  Merari (see scanned forms)  Access Code: [email or web address]  URL: [email or web address]  Date: 11/28/2022  Prepared by: Dane Gatica      Therapeutic Ex (45283)  NMR re-education (77155) Reps/Sets/time Notes/CUES/Assessment HEP   Scap set/shrugs 10x3\" ea  X    UT stretch  2x30\"   X    Table slide FLX HOLD  X    Pendulums CW/CCW 10x ea  X    Putty squeeze Ball squeeze                                                                                         Pt and family (wife) education  Pt education was provided in post op restrictions, precautions, progression of rehab expectations and timeline.    Sling adjusted                 Manual Intervention ([id number])                                      CUES NEEDED                Therapeutic Activity (03756)                                                Therapeutic Exercise and NMR EXR  [x] (55099) Provided verbal/tactile cueing for activities related to strengthening, flexibility, endurance, ROM for improvements in LE, proximal hip, and core control with self care, mobility, lifting, ambulation.  [] (41393) Provided verbal/tactile cueing for activities related to improving balance, coordination, kinesthetic sense, posture, motor skill, proprioception  to assist with LE, proximal hip, and core control in self care, mobility, lifting, ambulation and eccentric single leg control. NMR and Therapeutic Activities:    [] (31745 or 61359) Provided verbal/tactile cueing for activities related to improving balance, coordination, kinesthetic sense, posture, motor skill, proprioception and motor activation to allow for proper function of core, proximal hip and LE with self care and ADLs  [] (98047) Gait Re-education- Provided training and instruction to the patient for proper LE, core and proximal hip recruitment and positioning and eccentric body weight control with ambulation re-education including up and down stairs     Home Exercise Program:    [x] (78291) Reviewed/Progressed HEP activities related to strengthening, flexibility, endurance, ROM of core, proximal hip and LE for functional self-care, mobility, lifting and ambulation/stair navigation   [] (58525)Reviewed/Progressed HEP activities related to improving balance, coordination, kinesthetic sense, posture, motor skill, proprioception of core, proximal hip and LE for self care, mobility, lifting, and ambulation/stair navigation      Manual Treatments:  PROM / STM / Oscillations-Mobs:  G-I, II, III, IV (PA's, Inf., Post.)  [] (62166) Provided manual therapy to mobilize LE, proximal hip and/or LS spine soft tissue/joints for the purpose of modulating pain, promoting relaxation,  increasing ROM, reducing/eliminating soft tissue swelling/inflammation/restriction, improving soft tissue extensibility and allowing for proper ROM for normal function with self care, mobility, lifting and ambulation. Trigger point Dry Needling:  fine needle insertion into myofascial trigger points to stimulate a healing response  [] (86764) Needle insertion without injection: 1 or 2 muscles  [] (39550) Needle insertion without injection: 3 or more muscles    Modalities:     [] GAME READY (VASO)- for significant edema, swelling, pain control.     [] ESU (HV/PM) for edema and pain control      Charges:  Timed Code Treatment Minutes: 25   Total Treatment Minutes: 45       [x] EVAL (LOW) 88143 (typically 20 minutes face-to-face)  [] EVAL (MOD) 79017 (typically 30 minutes face-to-face)  [] EVAL (HIGH) 96867 (typically 45 minutes face-to-face)  [] RE-EVAL     [x] AN(51851) x 2   [] IONTO  [] NMR (14019) x     [] VASO  [] Manual (45786) x      [] Other:  [] TA x      [] Mech Traction (68787)  [] ES(attended) (43822)      [] ES (un) (88452):             GOALS:    Patient stated goal: to get mobility back  [] Progressing: [] Met: [] Not Met: [] Adjusted     Therapist goals for Patient:   Short Term Goals: To be achieved in: 2 weeks  1. Independent in HEP and progression per patient tolerance, in order to prevent re-injury. [] Progressing: [] Met: [] Not Met: [] Adjusted  2. Patient will have a decrease in pain to facilitate improvement in movement, function, and ADLs as indicated by Functional Deficits. [] Progressing: [] Met: [] Not Met: [] Adjusted     Long Term Goals: To be achieved in: 12 weeks        1. FOTO score to be equal or greater to the predicted score (65/100)  to assist with reaching prior level of function. [] Progressing: [] Met: [] Not Met: [] Adjusted      2. Patient will demonstrate increased AROM to equal the opposite side bilaterally to allow for reaching into upper kitchen cabinets as indicated by patients Functional Deficits. [] Progressing: [] Met: [] Not Met: [] Adjusted       3. Patient will demonstrate an increase in strength to 4+/5 to allow for lifting paint buckets less than 5 lbs  as indicated by patients Functional Deficits. [] Progressing: [] Met: [] Not Met: [] Adjusted       4. 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 (Pt specific functional or activity goal)Pt will be able to sleep w/o sleep aids or waking from shoulder pain.    [] Progressing: [] Met: [] Not Met: [] Adjusted  6. Pt will demonstrate pain decreased by 50%  (0-1/10) with all ADLS. [] Progressing: [] Met: [] Not Met: [] Adjusted          ASSESSMENT:    See eval for initial assessment      Overall Progression Towards Functional goals/ Treatment Progress Update:  [] Patient is progressing as expected towards functional goals listed. [] Progression is slowed due to complexities/Impairments listed. [] Progression has been slowed due to co-morbidities. [x] Plan just implemented, too soon to assess goals progression <30days   [] Goals require adjustment due to lack of progress  [] Patient is not progressing as expected and requires additional follow up with physician  [] Other    Prognosis for POC: [x] Good [] Fair  [] Poor      Patient requires continued skilled intervention: [x] Yes  [] No    Treatment/Activity Tolerance:  [x] Patient able to complete treatment  [] Patient limited by fatigue  [] Patient limited by pain    [] Patient limited by other medical complications  [] Other:     PLAN: See eval for initial POC  [] Continue per plan of care [] Alter current plan (see comments above)  [x] Plan of care initiated [] Hold pending MD visit [] Discharge      Electronically signed by:  Marco Barnard, [place] [id number]    Note: If patient does not return for scheduled/ recommended follow up visits, this note will serve as a discharge from care along with most recent update on progress.

## 2022-12-01 ENCOUNTER — HOSPITAL ENCOUNTER (OUTPATIENT)
Dept: PHYSICAL THERAPY | Age: 67
Setting detail: THERAPIES SERIES
Discharge: HOME | End: 2022-12-01
Payer: MEDICARE

## 2022-12-01 PROCEDURE — 97140 MANUAL THERAPY 1/> REGIONS: CPT | Performed by: PHYSICAL THERAPIST

## 2022-12-01 PROCEDURE — 97110 THERAPEUTIC EXERCISES: CPT | Performed by: PHYSICAL THERAPIST

## 2022-12-01 NOTE — FLOWSHEET NOTE
[facility], [address]  Phone: [phone]  Fax [phone]  :  Physical Therapy Treatment Note/ Progress Report:           Date:  2022    Patient Name:  Holli Nicole    :  1955  MRN: [id number]    Referring Physician: Miles Bennett MD/James Eden Blizzard                                                    Evaluation Date: 2022                                         Date of Referral:22     Insurance: [facility] Medicare BMN no auth     Next MD appt: scheduled:  none scheduled, to be in 4 weeks     Medical Diagnosis:  S46.011A (ICD-10-CM) - Traumatic complete tear of right rotator cuff, initial encounter     Treatment Diagnosis:  R  massive RCR and bicep tenodesis DOSx 10/26/22  R shoulder pain M25.511  R shoulder stiffness M215.611        Precautions/ Contra-indications: see below  Post operative per protocol:  Patient will wear an abduction pillow brace/sling for 6 weeks postop and be NWB for 6 weeks      Unless otherwise specified, do not begin PT until patient has seen M.D. for 2 weeks postop visit       Has the plan of care been signed (Y/N):        []  Yes  [x]  No    Progress report will be due (10 Rx or 30 days ): 38       Recertification will be due (POC Duration  / 90 days ): 23     Is this a Progress Report:     []  Yes  [x]  No      If Yes:  Date Range for reporting period:  Beginnin22  Ending:              Visit # Date Range Insurance Allowable Requires auth   IN PERSON 2 22- BMN    [x]no        []yes:   TELEHEALTH       TOTAL              CX/NS       Next PT appt: 22                PT Practice Pattern:D  Co morbidities:1-2  Surgical:R RCR and bicep tenodesis massive repair  DOS 10/26/22    INITIAL VISIT 22 CURRENT VISIT  22    PAIN 0-10/10 2/10 0-2   FUNCTIONAL SCALE FOTO 40/100    PROM SFLX 30 90    SER @ 0 ABD 0 15    Shoulder Abduction  60                     STRENGHT (MMT)                                              Latex Allergy/Pacemaker/Adhesive allergy:  [x]NO      []YES     RESTRICTIONS/PRECAUTIONS:   Patient will wear an abduction pillow brace/sling for 6 weeks postop and be NWB for 6 weeks     Unless otherwise specified, do not begin PT until patient has seen M.D. for 2 weeks postop visit     Phase I:     Weeks 0-4      May remove sling for gentle pendulum exercises 2-3 times per day      Elbows/hand gripping and range of motion exercises: Perform 4-6 times per day      Cryotherapy as needed      Rhythmic stabilization drills      Continue all isometric contractions and use of cryotherapy as needed      Initiate seated scapular isometrics      Screen posture      May begin joint mobilizations grade 1 and 2 for pain relief/relaxation          Weeks 4-5 (begin 11/28/22)     PROM/AAROM-- flexion to 90°, abduction to 90°, external rotation 30°, internal rotation 30°, extension 30°. (ER/IR in scapular plane, Flexion/Extension at 90° flexion in scapular plane)      ER/IR in scapular plane and at 90° adduction      Initiate strengthening using exercise tubing at 0° abduction (use towel roll under arm)      Initiate manual resistance ER in supine in scapular plane (light resistance)      Progress scapular strengthening      Initiate prone rowing with arm at 30° of adduction to neutral arm position      Initiate prone shoulder extension with the elbow flexed to 90°      Continue use of ice as needed.  May use heat prior to range of motion exercises      Rhythmic stabilization exercises (flexion at 45°, 90°, 100° and ER/IR at multiple angles)          Weeks 5-6      PROM/AAROM-flexion to 120°, abduction to 120°, external rotation to degrees, internal rotation 45°, extension 30      Joint mobilizations: Gentle scapular/glenohumeral joint mobilization as indicated to regain the full PROM      AAR in stretching exercises to gain full motion Shoulder flexion      ER at 90° abduction      Initiate AROM exercises      Shoulder flexion in scapular plane to 90° of flexion      Shoulder abduction to 90°      Progress isotonic strengthening exercise program      IR/ER tubing (towel under arm)      Side lying ER (towel under arm)       Prone rowing at 45° adduction      Prone horizontal abduction (flexed elbow) at 90° adduction      Biceps curls isotonic slipped very light resistance      Slowly progressive strengthening to prevent i inflammation of tendon            SUBJECTIVE:  Patient reports that his shoulder does not really hurt. Reports he feels a knot in his arm. Feels it more in the sling. Pain level:  0-2/10     OBJECTIVE: See eval  Observation/palpation: No palpable knot or TP noted in upper arm as patient describes. Patient points to area of deltoid tuberosity as to where it is located. Exercises/Interventions:     HEP:  [employer] (see scanned forms)  Access Code: [email or web address]  URL: [email or web address]  Date: 11/28/2022  Prepared by: Deborah Khalil      Therapeutic Ex (33437)  NMR re-education (46853) Reps/Sets/time Notes/CUES/Assessment HEP   Scap set2 x 10 x 3\" Cueing for posture X    DC'd due to neck pain/headache X    Table slide FLX 5 x 5\" To 90 deg (PT monitor) X    Pendulums A/P, M/L, CW/CCW x 10 ea Cueing to move body not actively move UE X              Prone rows 3\" 2 x 10 Needs cueing for proper scap activation          Wall isos flexion, abd, ER,IR 5\" x 10 ea Light activation, cueing for posture x                                                               Patient and wife ed 5' HEP progression, ice, restrictions/precautions                Manual Intervention (17632)      STM deltoid, pec, Oscillations for pain, GH Joint mobilizations Gr I/II, PROM flexion, abduction, ER, IR within restrictions 23' Flexion and abd to 90, ER< 30, IR < 30    Submax isometrics ER/IR in supine at 0 deg abd 5\" x 5 ea CUES NEEDED                Therapeutic Activity (72398)                                                Therapeutic Exercise and NMR EXR  [x] (86107) Provided verbal/tactile cueing for activities related to strengthening, flexibility, endurance, ROM for improvements in LE, proximal hip, and core control with self care, mobility, lifting, ambulation. [x] (65326) Provided verbal/tactile cueing for activities related to improving balance, coordination, kinesthetic sense, posture, motor skill, proprioception  to assist with LE, proximal hip, and core control in self care, mobility, lifting, ambulation and eccentric single leg control.      NMR and Therapeutic Activities:    [x] (14020 or 01090) Provided verbal/tactile cueing for activities related to improving balance, coordination, kinesthetic sense, posture, motor skill, proprioception and motor activation to allow for proper function of core, proximal hip and LE with self care and ADLs  [] (25857) Gait Re-education- Provided training and instruction to the patient for proper LE, core and proximal hip recruitment and positioning and eccentric body weight control with ambulation re-education including up and down stairs     Home Exercise Program:    [x] (22053) Reviewed/Progressed HEP activities related to strengthening, flexibility, endurance, ROM of core, proximal hip and LE for functional self-care, mobility, lifting and ambulation/stair navigation   [] (25799)Reviewed/Progressed HEP activities related to improving balance, coordination, kinesthetic sense, posture, motor skill, proprioception of core, proximal hip and LE for self care, mobility, lifting, and ambulation/stair navigation      Manual Treatments:  PROM / STM / Oscillations-Mobs:  G-I, II, III, IV (PA's, Inf., Post.)  [x] (50344) Provided manual therapy to mobilize LE, proximal hip and/or LS spine soft tissue/joints for the purpose of modulating pain, promoting relaxation,  increasing ROM, reducing/eliminating soft tissue swelling/inflammation/restriction, improving soft tissue extensibility and allowing for proper ROM for normal function with self care, mobility, lifting and ambulation. Trigger point Dry Needling:  fine needle insertion into myofascial trigger points to stimulate a healing response  [] (27368) Needle insertion without injection: 1 or 2 muscles  [] (10040) Needle insertion without injection: 3 or more muscles    Modalities:  CP x 15'   [] GAME READY (VASO)- for significant edema, swelling, pain control. [] ESU (HV/PM) for edema and pain control      Charges:  Timed Code Treatment Minutes: 45'   Total Treatment Minutes: 48'       [] EVAL (LOW) 37044 (typically 20 minutes face-to-face)  [] EVAL (MOD) 04248 (typically 30 minutes face-to-face)  [] EVAL (HIGH) 87651 (typically 45 minutes face-to-face)  [] RE-EVAL     [x] (76057) x 1   [] IONTO  [] NMR (96405) x     [] VASO  [x] Manual (55999) x 2     [] Other:  [] TA x      [] Mech Traction (78230)  [] ES(attended) (43658)      [] ES (un) (89411):             GOALS:    Patient stated goal: to get mobility back  [] Progressing: [] Met: [] Not Met: [] Adjusted     Therapist goals for Patient:   Short Term Goals: To be achieved in: 2 weeks  1. Independent in HEP and progression per patient tolerance, in order to prevent re-injury. [] Progressing: [] Met: [] Not Met: [] Adjusted  2. Patient will have a decrease in pain to facilitate improvement in movement, function, and ADLs as indicated by Functional Deficits. [] Progressing: [] Met: [] Not Met: [] Adjusted     Long Term Goals: To be achieved in: 12 weeks        1. FOTO score to be equal or greater to the predicted score (65/100)  to assist with reaching prior level of function. [] Progressing: [] Met: [] Not Met: [] Adjusted      2.  Patient will demonstrate increased AROM to equal the opposite side bilaterally to allow for reaching into upper kitchen cabinets as indicated by patients Functional Deficits. [] Progressing: [] Met: [] Not Met: [] Adjusted       3. Patient will demonstrate an increase in strength to 4+/5 to allow for lifting paint buckets less than 5 lbs  as indicated by patients Functional Deficits. [] Progressing: [] Met: [] Not Met: [] Adjusted       4. 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 (Pt specific functional or activity goal)Pt will be able to sleep w/o sleep aids or waking from shoulder pain. [] Progressing: [] Met: [] Not Met: [] Adjusted  6. Pt will demonstrate pain decreased by 50%  (0-1/10) with all ADLS. [] Progressing: [] Met: [] Not Met: [] Adjusted          ASSESSMENT: Patient did well with gentle STM and PROM this date, with cueing needed to relax throughout. Tolerated manual and wall isometrics well, with cueing needed for minimal exertion. Patient reported mild soreness at conclusion of session. Did not add tables slides to HEP as patient was easily able to achieve 90 deg consistently and needed cueing to avoid overstretching. May add NV when patient can increase range. Patient with questions regarding exercises and his shoulder in general throughout session. Overall Progression Towards Functional goals/ Treatment Progress Update:  [] Patient is progressing as expected towards functional goals listed. [] Progression is slowed due to complexities/Impairments listed. [] Progression has been slowed due to co-morbidities.   [x] Plan just implemented, too soon to assess goals progression <30days   [] Goals require adjustment due to lack of progress  [] Patient is not progressing as expected and requires additional follow up with physician  [] Other    Prognosis for POC: [x] Good [] Fair  [] Poor      Patient requires continued skilled intervention: [x] Yes  [] No    Treatment/Activity Tolerance:  [x] Patient able to complete treatment  [] Patient limited by fatigue  [] Patient limited by pain    [] Patient limited by other medical complications  [] Other:     PLAN: See eval for initial POC  [x] Continue per plan of care [] Alter current plan (see comments above)  [] Plan of care initiated [] Hold pending MD visit [] Discharge      Electronically signed by:  Joanna Mayes PT, DPT [id number]     Note: If patient does not return for scheduled/ recommended follow up visits, this note will serve as a discharge from care along with most recent update on progress.

## 2022-12-05 ENCOUNTER — OFFICE VISIT (OUTPATIENT)
Dept: ORTHOPEDIC SURGERY | Age: 67
End: 2022-12-05

## 2022-12-05 ENCOUNTER — HOSPITAL ENCOUNTER (OUTPATIENT)
Dept: PHYSICAL THERAPY | Age: 67
Setting detail: THERAPIES SERIES
Discharge: HOME | End: 2022-12-05
Payer: MEDICARE

## 2022-12-05 VITALS — BODY MASS INDEX: 23.55 KG/M2 | HEIGHT: 69 IN | WEIGHT: 159 LBS

## 2022-12-05 DIAGNOSIS — Z98.890 S/P ROTATOR CUFF REPAIR: Primary | ICD-10-CM

## 2022-12-05 PROCEDURE — 97140 MANUAL THERAPY 1/> REGIONS: CPT | Performed by: PHYSICAL THERAPIST

## 2022-12-05 PROCEDURE — 97110 THERAPEUTIC EXERCISES: CPT | Performed by: PHYSICAL THERAPIST

## 2022-12-05 NOTE — PROGRESS NOTES
Dr Maryam Felix      Date /Time 12/5/2022       2:32 PM EST  Name Diego Pascual             1955   Location  [facility]  MRN [id number]                No chief complaint on file. History of Present Illness      Diego Pascual is a 78 y.o. male is here for post-op visit after RIGHT        Patient presents to the office today for follow-up visit. He is seen in courtesy for Dr. Farida Deutsch who is out of the office. Patient is approximately 5+ weeks status post left massive rotator cuff repair and biceps tenodesis. He is currently doing well. Pain controlled. Physical Exam    Based off 1997 Exam Criteria    There were no vitals taken for this visit. Constitutional:       General: He is not in acute distress. Appearance: Normal appearance. RIGHT Shoulder: incision clean, intact, healing appropriately. No surrounding  erythema or fluctuance. Neuro intact distal. No evidence of DVT. Passive forward flexion 100, and 45 degrees of external rotation. Imaging         Assessment and Plan    Donn Malik was seen today for follow-up. Diagnoses and all orders for this visit:    S/P rotator cuff repair        Patient is almost 6 weeks postop. At 6 weeks postop he can gradually discontinue sling. He should continue physical therapy. No lifting with the right upper extremity. Continue with physical therapy along the massive rotator cuff tear protocol. Patient will follow-up with Dr. Farida Deutsch in 4 weeks or sooner if problems arise        Electronically signed by Holger Alvarenga PA-C on 12/5/2022 at 2:27 PM  This dictation was generated by voice recognition computer software. Although all attempts are made to edit the dictation for accuracy, there may be errors in the transcription that are not intended.

## 2022-12-05 NOTE — FLOWSHEET NOTE
[facility], [address]  Phone: [phone]  Fax [phone]  :  Physical Therapy Treatment Note/ Progress Report:           Date:  2022    Patient Name:  Prudence Zacarias    :  1955  MRN: [id number]    Referring Physician: Franco Pal MD/Pedro Roe                                                    Evaluation Date: 2022                                         Date of Referral:22     Insurance: [facility] Medicare BMN no auth     Next MD appt: scheduled:  none scheduled, to be in 4 weeks     Medical Diagnosis:  S46.011A (ICD-10-CM) - Traumatic complete tear of right rotator cuff, initial encounter     Treatment Diagnosis:  R  massive RCR and bicep tenodesis DOSx 10/26/22  R shoulder pain M25.511  R shoulder stiffness M215.611        Precautions/ Contra-indications: see below  Post operative per protocol:  Patient will wear an abduction pillow brace/sling for 6 weeks postop and be NWB for 6 weeks      Unless otherwise specified, do not begin PT until patient has seen M.D. for 2 weeks postop visit       Has the plan of care been signed (Y/N):        []  Yes  [x]  No    Progress report will be due (10 Rx or 30 days ):        Recertification will be due (POC Duration  / 90 days ): 23     Is this a Progress Report:     []  Yes  [x]  No      If Yes:  Date Range for reporting period:  Beginnin22  Ending:              Visit # Date Range Insurance Allowable Requires auth   IN PERSON 2 22- BMN    [x]no        []yes:   TELEHEALTH       TOTAL              CX/NS       Next PT appt: 22                PT Practice Pattern:D  Co morbidities:1-2  Surgical:R RCR and bicep tenodesis massive repair  DOS 10/26/22    INITIAL VISIT 22 CURRENT VISIT  22    PAIN 0-10/10 2/10 0-2   FUNCTIONAL SCALE FOTO 40/100    PROM SFLX 30 90  100 on 22    SER @ 0 ABD 0 15 Shoulder Abduction  60                     STRENGHT (MMT)                                              Latex Allergy/Pacemaker/Adhesive allergy:  [x]NO      []YES     RESTRICTIONS/PRECAUTIONS:   Patient will wear an abduction pillow brace/sling for 6 weeks postop and be NWB for 6 weeks     Unless otherwise specified, do not begin PT until patient has seen M.D. for 2 weeks postop visit     Phase I:     Weeks 0-4      May remove sling for gentle pendulum exercises 2-3 times per day      Elbows/hand gripping and range of motion exercises: Perform 4-6 times per day      Cryotherapy as needed      Rhythmic stabilization drills      Continue all isometric contractions and use of cryotherapy as needed      Initiate seated scapular isometrics      Screen posture      May begin joint mobilizations grade 1 and 2 for pain relief/relaxation          Weeks 4-5 (begin 11/28/22)     PROM/AAROM-- flexion to 90°, abduction to 90°, external rotation 30°, internal rotation 30°, extension 30°. (ER/IR in scapular plane, Flexion/Extension at 90° flexion in scapular plane)      ER/IR in scapular plane and at 90° adduction      Initiate strengthening using exercise tubing at 0° abduction (use towel roll under arm)      Initiate manual resistance ER in supine in scapular plane (light resistance)      Progress scapular strengthening      Initiate prone rowing with arm at 30° of adduction to neutral arm position      Initiate prone shoulder extension with the elbow flexed to 90°      Continue use of ice as needed.  May use heat prior to range of motion exercises      Rhythmic stabilization exercises (flexion at 45°, 90°, 100° and ER/IR at multiple angles)          Weeks 5-6      PROM/AAROM-flexion to 120°, abduction to 120°, external rotation to degrees, internal rotation 45°, extension 30      Joint mobilizations: Gentle scapular/glenohumeral joint mobilization as indicated to regain the full PROM      AAR in stretching exercises to gain full motion      Shoulder flexion      ER at 90° abduction      Initiate AROM exercises      Shoulder flexion in scapular plane to 90° of flexion      Shoulder abduction to 90°      Progress isotonic strengthening exercise program      IR/ER tubing (towel under arm)      Side lying ER (towel under arm)       Prone rowing at 45° adduction      Prone horizontal abduction (flexed elbow) at 90° adduction      Biceps curls isotonic slipped very light resistance      Slowly progressive strengthening to prevent i inflammation of tendon            SUBJECTIVE:  Patient reports that his shoulder does not really hurt. It was a little sore after the last visit but iced it. Pain level:  0-2/10     OBJECTIVE:     Observation/palpation: No palpable knot or TP noted in upper arm as patient describes. Patient points to area of deltoid tuberosity as to where it is located. Exercises/Interventions:     HEP:  [employer] (see scanned forms)  Access Code: [email or web address]  URL: [email or web address]  Date: 11/28/2022  Prepared by: Susan Mcclure      Therapeutic Ex (98692)  NMR re-education (23292) Reps/Sets/time Notes/CUES/Assessment HEP   Cueing for posture X    DC'd due to neck pain/headache X    Table slide FLX BUE 5 x 10\"  X 12/5   Pendulums A/P, M/L, CW/CCW x 10 ea Cueing to move body not actively move UE X              Prone rows 15x3\"     Prone EXT 15x3\"     x   25-50% submax isos  Supine IR/ER and EFLX/EEXT  seated 5x5\" ea     Tricep ext prone 12x3\"           Standing scap set 15x3\"  X 12/5   Standing bicep curls 6x focus ECC [place] in anterior shoulder                                  Patient  5' HEP progression, ice, restrictions/precautions                Manual Intervention (29787)      STM deltoid, pec, Oscillations for pain, GH Joint mobilizations Gr I/II, PROM flexion, abduction, ER, IR within restrictions x23' Flexion and abd to 90, ER< 30, IR < 30                              CUES NEEDED                Therapeutic Activity (87181)                                                Therapeutic Exercise and NMR EXR  [x] (45171) Provided verbal/tactile cueing for activities related to strengthening, flexibility, endurance, ROM for improvements in LE, proximal hip, and core control with self care, mobility, lifting, ambulation. [x] (23633) Provided verbal/tactile cueing for activities related to improving balance, coordination, kinesthetic sense, posture, motor skill, proprioception  to assist with LE, proximal hip, and core control in self care, mobility, lifting, ambulation and eccentric single leg control.      NMR and Therapeutic Activities:    [x] (53747 or 31431) Provided verbal/tactile cueing for activities related to improving balance, coordination, kinesthetic sense, posture, motor skill, proprioception and motor activation to allow for proper function of core, proximal hip and LE with self care and ADLs  [] (10675) Gait Re-education- Provided training and instruction to the patient for proper LE, core and proximal hip recruitment and positioning and eccentric body weight control with ambulation re-education including up and down stairs     Home Exercise Program:    [x] (36476) Reviewed/Progressed HEP activities related to strengthening, flexibility, endurance, ROM of core, proximal hip and LE for functional self-care, mobility, lifting and ambulation/stair navigation   [] (50787)Reviewed/Progressed HEP activities related to improving balance, coordination, kinesthetic sense, posture, motor skill, proprioception of core, proximal hip and LE for self care, mobility, lifting, and ambulation/stair navigation      Manual Treatments:  PROM / STM / Oscillations-Mobs:  G-I, II, III, IV (PA's, Inf., Post.)  [x] (48739) Provided manual therapy to mobilize LE, proximal hip and/or LS spine soft tissue/joints for the purpose of modulating pain, promoting relaxation,  increasing ROM, reducing/eliminating soft tissue swelling/inflammation/restriction, improving soft tissue extensibility and allowing for proper ROM for normal function with self care, mobility, lifting and ambulation. Trigger point Dry Needling:  fine needle insertion into myofascial trigger points to stimulate a healing response  [] (37397) Needle insertion without injection: 1 or 2 muscles  [] (87600) Needle insertion without injection: 3 or more muscles    Modalities:  CP x 15'   [] GAME READY (VASO)- for significant edema, swelling, pain control. [] ESU (HV/PM) for edema and pain control      Charges:  Timed Code Treatment Minutes: 39'   Total Treatment Minutes: 61'       [] EVAL (LOW) [id number] (typically 20 minutes face-to-face)  [] EVAL (MOD) 62833 (typically 30 minutes face-to-face)  [] EVAL (HIGH) 27670 (typically 45 minutes face-to-face)  [] RE-EVAL     [x] AD(95576) x 1   [] IONTO  [] NMR (94440) x     [] VASO  [x] Manual (64191) x 2     [] Other:  [] TA x      [] Mech Traction (91728)  [] ES(attended) (67823)      [] ES (un) (55012):             GOALS:    Patient stated goal: to get mobility back  [] Progressing: [] Met: [] Not Met: [] Adjusted     Therapist goals for Patient:   Short Term Goals: To be achieved in: 2 weeks  1. Independent in HEP and progression per patient tolerance, in order to prevent re-injury. [] Progressing: [] Met: [] Not Met: [] Adjusted  2. Patient will have a decrease in pain to facilitate improvement in movement, function, and ADLs as indicated by Functional Deficits. [] Progressing: [] Met: [] Not Met: [] Adjusted     Long Term Goals: To be achieved in: 12 weeks        1. FOTO score to be equal or greater to the predicted score (65/100)  to assist with reaching prior level of function. [] Progressing: [] Met: [] Not Met: [] Adjusted      2. Patient will demonstrate increased AROM to equal the opposite side bilaterally to allow for reaching into upper kitchen cabinets as indicated by patients Functional Deficits.    [] Progressing: [] Met: [] Not Met: [] Adjusted       3. Patient will demonstrate an increase in strength to 4+/5 to allow for lifting paint buckets less than 5 lbs  as indicated by patients Functional Deficits. [] Progressing: [] Met: [] Not Met: [] Adjusted       4. 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 (Pt specific functional or activity goal)Pt will be able to sleep w/o sleep aids or waking from shoulder pain. [] Progressing: [] Met: [] Not Met: [] Adjusted  6. Pt will demonstrate pain decreased by 50%  (0-1/10) with all ADLS. [] Progressing: [] Met: [] Not Met: [] Adjusted          ASSESSMENT: Pt tolerated all therex well this visit. Patient reported mild soreness at conclusion of session. Table slides added to HEP as pt able to increase ROM to 120 this week and he will not be in for another week. Patient with questions regarding exercises and his shoulder in general throughout session. Overall Progression Towards Functional goals/ Treatment Progress Update:  [] Patient is progressing as expected towards functional goals listed. [] Progression is slowed due to complexities/Impairments listed. [] Progression has been slowed due to co-morbidities.   [x] Plan just implemented, too soon to assess goals progression <30days   [] Goals require adjustment due to lack of progress  [] Patient is not progressing as expected and requires additional follow up with physician  [] Other    Prognosis for POC: [x] Good [] Fair  [] Poor      Patient requires continued skilled intervention: [x] Yes  [] No    Treatment/Activity Tolerance:  [x] Patient able to complete treatment  [] Patient limited by fatigue  [] Patient limited by pain    [] Patient limited by other medical complications  [] Other:     PLAN: Cont ROM and strength per protocol. Pt to be seen 2x weekly. Pt is having difficulty scheduling due to other appts and his wife's schedule as well. [x] Continue per plan of care [] Alter current plan (see comments above)  [] Plan of care initiated [] Hold pending MD visit [] Discharge      Electronically signed by:  Mora Ceja PT, [id number]    Note: If patient does not return for scheduled/ recommended follow up visits, this note will serve as a discharge from care along with most recent update on progress.

## 2022-12-08 ENCOUNTER — HOSPITAL ENCOUNTER (OUTPATIENT)
Dept: PHYSICAL THERAPY | Age: 67
Setting detail: THERAPIES SERIES
Discharge: HOME | End: 2022-12-08
Payer: MEDICARE

## 2022-12-08 PROCEDURE — 97110 THERAPEUTIC EXERCISES: CPT | Performed by: PHYSICAL THERAPIST

## 2022-12-08 PROCEDURE — 97112 NEUROMUSCULAR REEDUCATION: CPT | Performed by: PHYSICAL THERAPIST

## 2022-12-08 PROCEDURE — 97140 MANUAL THERAPY 1/> REGIONS: CPT | Performed by: PHYSICAL THERAPIST

## 2022-12-08 NOTE — FLOWSHEET NOTE
[facility], [address]  Phone: [phone]  Fax [phone]  :  Physical Therapy Treatment Note/ Progress Report:           Date:  2022    Patient Name:  Franky Kovacs    :  1955  MRN: [id number]    Referring Physician: Prabhu Stewart MD/Pedro Bueno                                                    Evaluation Date: 2022                                         Date of Referral:22     Insurance: [facility] Medicare BMN no auth     Next MD appt: scheduled:  none scheduled, to be in 4 weeks     Medical Diagnosis:  S46.011A (ICD-10-CM) - Traumatic complete tear of right rotator cuff, initial encounter     Treatment Diagnosis:  R  massive RCR and bicep tenodesis DOSx 10/26/22  R shoulder pain M25.511  R shoulder stiffness M215.611        Precautions/ Contra-indications: see below  Post operative per protocol:  Patient will wear an abduction pillow brace/sling for 6 weeks postop and be NWB for 6 weeks      Unless otherwise specified, do not begin PT until patient has seen M.D. for 2 weeks postop visit       Has the plan of care been signed (Y/N):        []  Yes  [x]  No    Progress report will be due (10 Rx or 30 days ):        Recertification will be due (POC Duration  / 90 days ): 23     Is this a Progress Report:     []  Yes  [x]  No      If Yes:  Date Range for reporting period:  Beginnin22  Ending:              Visit # Date Range Insurance Allowable Requires auth   IN PERSON 3 22- BMN    [x]no        []yes:   TELEHEALTH       TOTAL              CX/NS       Next PT appt:                 PT Practice Pattern:D  Co morbidities:1-2  Surgical:R RCR and bicep tenodesis massive repair  DOS 10/26/22    INITIAL VISIT 22 CURRENT VISIT  22    PAIN 0-10/10 2/10 0-2   FUNCTIONAL SCALE FOTO 40/100    PROM SFLX 30 90  100 on 22    SER @ 0 ABD 0 15    Shoulder Abduction  60                     STRENGHT (MMT)                                              Latex Allergy/Pacemaker/Adhesive allergy:  [x]NO      []YES     RESTRICTIONS/PRECAUTIONS:   Patient will wear an abduction pillow brace/sling for 6 weeks postop and be NWB for 6 weeks     Unless otherwise specified, do not begin PT until patient has seen M.D. for 2 weeks postop visit     Phase I:     Weeks 0-4      May remove sling for gentle pendulum exercises 2-3 times per day      Elbows/hand gripping and range of motion exercises: Perform 4-6 times per day      Cryotherapy as needed      Rhythmic stabilization drills      Continue all isometric contractions and use of cryotherapy as needed      Initiate seated scapular isometrics      Screen posture      May begin joint mobilizations grade 1 and 2 for pain relief/relaxation          Weeks 4-5 (begin 11/28/22)     PROM/AAROM-- flexion to 90°, abduction to 90°, external rotation 30°, internal rotation 30°, extension 30°. (ER/IR in scapular plane, Flexion/Extension at 90° flexion in scapular plane)      ER/IR in scapular plane and at 90° adduction      Initiate strengthening using exercise tubing at 0° abduction (use towel roll under arm)      Initiate manual resistance ER in supine in scapular plane (light resistance)      Progress scapular strengthening      Initiate prone rowing with arm at 30° of adduction to neutral arm position      Initiate prone shoulder extension with the elbow flexed to 90°      Continue use of ice as needed.  May use heat prior to range of motion exercises      Rhythmic stabilization exercises (flexion at 45°, 90°, 100° and ER/IR at multiple angles)          Weeks 5-6      PROM/AAROM-flexion to 120°, abduction to 120°, external rotation to degrees, internal rotation 45°, extension 30      Joint mobilizations: Gentle scapular/glenohumeral joint mobilization as indicated to regain the full PROM      AAR in stretching exercises to gain full motion Therapeutic Activity (68341)                                                Therapeutic Exercise and NMR EXR  [x] (60412) Provided verbal/tactile cueing for activities related to strengthening, flexibility, endurance, ROM for improvements in LE, proximal hip, and core control with self care, mobility, lifting, ambulation. [x] (95196) Provided verbal/tactile cueing for activities related to improving balance, coordination, kinesthetic sense, posture, motor skill, proprioception  to assist with LE, proximal hip, and core control in self care, mobility, lifting, ambulation and eccentric single leg control.      NMR and Therapeutic Activities:    [x] (83935 or 64934) Provided verbal/tactile cueing for activities related to improving balance, coordination, kinesthetic sense, posture, motor skill, proprioception and motor activation to allow for proper function of core, proximal hip and LE with self care and ADLs  [] (15437) Gait Re-education- Provided training and instruction to the patient for proper LE, core and proximal hip recruitment and positioning and eccentric body weight control with ambulation re-education including up and down stairs     Home Exercise Program:    [x] (87780) Reviewed/Progressed HEP activities related to strengthening, flexibility, endurance, ROM of core, proximal hip and LE for functional self-care, mobility, lifting and ambulation/stair navigation   [] (92231)Reviewed/Progressed HEP activities related to improving balance, coordination, kinesthetic sense, posture, motor skill, proprioception of core, proximal hip and LE for self care, mobility, lifting, and ambulation/stair navigation      Manual Treatments:  PROM / STM / Oscillations-Mobs:  G-I, II, III, IV (PA's, Inf., Post.)  [x] (24549) Provided manual therapy to mobilize LE, proximal hip and/or LS spine soft tissue/joints for the purpose of modulating pain, promoting relaxation,  increasing ROM, reducing/eliminating soft tissue swelling/inflammation/restriction, improving soft tissue extensibility and allowing for proper ROM for normal function with self care, mobility, lifting and ambulation. Trigger point Dry Needling:  fine needle insertion into myofascial trigger points to stimulate a healing response  [] (51204) Needle insertion without injection: 1 or 2 muscles  [] (12000) Needle insertion without injection: 3 or more muscles    Modalities:  CP x 15'   [] GAME READY (VASO)- for significant edema, swelling, pain control. [] ESU (HV/PM) for edema and pain control      Charges:  Timed Code Treatment Minutes: 39'   Total Treatment Minutes: 61'       [] EVAL (LOW) 455 1011 (typically 20 minutes face-to-face)  [] EVAL (MOD) 93141 (typically 30 minutes face-to-face)  [] EVAL (HIGH) 53986 (typically 45 minutes face-to-face)  [] RE-EVAL     [x] CD(10370) x 1   [] IONTO  [x] NMR (62153) x  1   [] VASO  [x] Manual (10244) x 1     [] Other:  [] TA x      [] Mech Traction (79972)  [] ES(attended) (19526)      [] ES (un) (23404):             GOALS:    Patient stated goal: to get mobility back  [] Progressing: [] Met: [] Not Met: [] Adjusted     Therapist goals for Patient:   Short Term Goals: To be achieved in: 2 weeks  1. Independent in HEP and progression per patient tolerance, in order to prevent re-injury. [] Progressing: [] Met: [] Not Met: [] Adjusted  2. Patient will have a decrease in pain to facilitate improvement in movement, function, and ADLs as indicated by Functional Deficits. [] Progressing: [] Met: [] Not Met: [] Adjusted     Long Term Goals: To be achieved in: 12 weeks        1. FOTO score to be equal or greater to the predicted score (65/100)  to assist with reaching prior level of function. [] Progressing: [] Met: [] Not Met: [] Adjusted      2.  Patient will demonstrate increased AROM to equal the opposite side bilaterally to allow for reaching into upper kitchen cabinets as indicated by patients Functional Deficits. [] Progressing: [] Met: [] Not Met: [] Adjusted       3. Patient will demonstrate an increase in strength to 4+/5 to allow for lifting paint buckets less than 5 lbs  as indicated by patients Functional Deficits. [] Progressing: [] Met: [] Not Met: [] Adjusted       4. 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 (Pt specific functional or activity goal)Pt will be able to sleep w/o sleep aids or waking from shoulder pain. [] Progressing: [] Met: [] Not Met: [] Adjusted  6. Pt will demonstrate pain decreased by 50%  (0-1/10) with all ADLS. [] Progressing: [] Met: [] Not Met: [] Adjusted          ASSESSMENT: Pt tolerated all therex well this visit. Patient reported mild soreness at conclusion of session. Pt demonstrated significant tightness in his pecs, joint capsule and RC this visit from 9 hours of sitting at the hospital. It is expected that he will improve by next visit. Patient with questions regarding exercises and his shoulder in general throughout session. Overall Progression Towards Functional goals/ Treatment Progress Update:  [] Patient is progressing as expected towards functional goals listed. [] Progression is slowed due to complexities/Impairments listed. [] Progression has been slowed due to co-morbidities.   [x] Plan just implemented, too soon to assess goals progression <30days   [] Goals require adjustment due to lack of progress  [] Patient is not progressing as expected and requires additional follow up with physician  [] Other    Prognosis for POC: [x] Good [] Fair  [] Poor      Patient requires continued skilled intervention: [x] Yes  [] No    Treatment/Activity Tolerance:  [x] Patient able to complete treatment  [] Patient limited by fatigue  [] Patient limited by pain    [] Patient limited by other medical complications  [] Other:     PLAN: Cont ROM and strength per protocol. Pt to be seen 2x weekly. Pt is having difficulty scheduling due to other appts and his wife's schedule as well. [x] Continue per plan of care [] Alter current plan (see comments above)  [] Plan of care initiated [] Hold pending MD visit [] Discharge      Electronically signed by:  Manjula Ray, PT, [id number]    Note: If patient does not return for scheduled/ recommended follow up visits, this note will serve as a discharge from care along with most recent update on progress.

## 2022-12-12 ENCOUNTER — HOSPITAL ENCOUNTER (OUTPATIENT)
Dept: PHYSICAL THERAPY | Age: 67
Setting detail: THERAPIES SERIES
Discharge: HOME | End: 2022-12-12
Payer: MEDICARE

## 2022-12-12 PROCEDURE — 97110 THERAPEUTIC EXERCISES: CPT | Performed by: PHYSICAL THERAPIST

## 2022-12-12 PROCEDURE — 97112 NEUROMUSCULAR REEDUCATION: CPT | Performed by: PHYSICAL THERAPIST

## 2022-12-12 PROCEDURE — 97140 MANUAL THERAPY 1/> REGIONS: CPT | Performed by: PHYSICAL THERAPIST

## 2022-12-12 NOTE — FLOWSHEET NOTE
[facility], [address]  Phone: [phone]  Fax [phone]  :  Physical Therapy Treatment Note/ Progress Report:           Date:  2022    Patient Name:  Eric Leal    :  1955  MRN: [id number]    Referring Physician: Orestes Blankenship MD/Pedro Dey                                                    Evaluation Date: 2022                                         Date of Referral:22     Insurance: [facility] Medicare BMN no auth     Next MD appt: scheduled:  none scheduled, to be in 4 weeks     Medical Diagnosis:  S46.011A (ICD-10-CM) - Traumatic complete tear of right rotator cuff, initial encounter     Treatment Diagnosis:  R  massive RCR and bicep tenodesis DOSx 10/26/22  R shoulder pain M25.511  R shoulder stiffness M215.611        Precautions/ Contra-indications: see below  Post operative per protocol:  Patient will wear an abduction pillow brace/sling for 6 weeks postop and be NWB for 6 weeks      Unless otherwise specified, do not begin PT until patient has seen M.D. for 2 weeks postop visit       Has the plan of care been signed (Y/N):        []  Yes  [x]  No    Progress report will be due (10 Rx or 30 days ):        Recertification will be due (POC Duration  / 90 days ): 23     Is this a Progress Report:     []  Yes  [x]  No      If Yes:  Date Range for reporting period:  Beginnin22  Ending:              Visit # Date Range Insurance Allowable Requires auth   IN PERSON 4 22- BMN    [x]no        []yes:   TELEHEALTH       TOTAL              CX/NS       Next PT appt: 22                PT Practice Pattern:D  Co morbidities:1-2  Surgical:R RCR and bicep tenodesis massive repair  DOS 10/26/22    INITIAL VISIT 22 CURRENT VISIT  22    PAIN 0-10/10 2/10 0-2   FUNCTIONAL SCALE FOTO 40/100    PROM SFLX 30 90  100 on 22    SER @ 0 ABD 0 15 Shoulder Abduction  60                     STRENGHT (MMT)                                              Latex Allergy/Pacemaker/Adhesive allergy:  [x]NO      []YES     RESTRICTIONS/PRECAUTIONS:   Patient will wear an abduction pillow brace/sling for 6 weeks postop and be NWB for 6 weeks     Unless otherwise specified, do not begin PT until patient has seen M.D. for 2 weeks postop visit     Phase I:     Weeks 0-4      May remove sling for gentle pendulum exercises 2-3 times per day      Elbows/hand gripping and range of motion exercises: Perform 4-6 times per day      Cryotherapy as needed      Rhythmic stabilization drills      Continue all isometric contractions and use of cryotherapy as needed      Initiate seated scapular isometrics      Screen posture      May begin joint mobilizations grade 1 and 2 for pain relief/relaxation          Weeks 4-5 (begin 11/28/22)     PROM/AAROM-- flexion to 90°, abduction to 90°, external rotation 30°, internal rotation 30°, extension 30°. (ER/IR in scapular plane, Flexion/Extension at 90° flexion in scapular plane)      ER/IR in scapular plane and at 90° adduction      Initiate strengthening using exercise tubing at 0° abduction (use towel roll under arm)      Initiate manual resistance ER in supine in scapular plane (light resistance)      Progress scapular strengthening      Initiate prone rowing with arm at 30° of adduction to neutral arm position      Initiate prone shoulder extension with the elbow flexed to 90°      Continue use of ice as needed.  May use heat prior to range of motion exercises      Rhythmic stabilization exercises (flexion at 45°, 90°, 100° and ER/IR at multiple angles)          Weeks 5-6      PROM/AAROM-flexion to 120°, abduction to 120°, external rotation to degrees, internal rotation 45°, extension 30      Joint mobilizations: Gentle scapular/glenohumeral joint mobilization as indicated to regain the full PROM      AAR in stretching exercises to gain full flexibility, endurance, ROM for improvements in LE, proximal hip, and core control with self care, mobility, lifting, ambulation. [x] (66520) Provided verbal/tactile cueing for activities related to improving balance, coordination, kinesthetic sense, posture, motor skill, proprioception  to assist with LE, proximal hip, and core control in self care, mobility, lifting, ambulation and eccentric single leg control. NMR and Therapeutic Activities:    [x] (31087 or 72078) Provided verbal/tactile cueing for activities related to improving balance, coordination, kinesthetic sense, posture, motor skill, proprioception and motor activation to allow for proper function of core, proximal hip and LE with self care and ADLs  [] (51081) Gait Re-education- Provided training and instruction to the patient for proper LE, core and proximal hip recruitment and positioning and eccentric body weight control with ambulation re-education including up and down stairs     Home Exercise Program:    [x] (80587) Reviewed/Progressed HEP activities related to strengthening, flexibility, endurance, ROM of core, proximal hip and LE for functional self-care, mobility, lifting and ambulation/stair navigation   [] (56547)Reviewed/Progressed HEP activities related to improving balance, coordination, kinesthetic sense, posture, motor skill, proprioception of core, proximal hip and LE for self care, mobility, lifting, and ambulation/stair navigation      Manual Treatments:  PROM / STM / Oscillations-Mobs:  G-I, II, III, IV (PA's, Inf., Post.)  [x] (61628) Provided manual therapy to mobilize LE, proximal hip and/or LS spine soft tissue/joints for the purpose of modulating pain, promoting relaxation,  increasing ROM, reducing/eliminating soft tissue swelling/inflammation/restriction, improving soft tissue extensibility and allowing for proper ROM for normal function with self care, mobility, lifting and ambulation.      Trigger point Dry Needling: fine needle insertion into myofascial trigger points to stimulate a healing response  [] (65384) Needle insertion without injection: 1 or 2 muscles  [] (13667) Needle insertion without injection: 3 or more muscles    Modalities:  CP x 10'   [] GAME READY (VASO)- for significant edema, swelling, pain control. [] ESU (HV/PM) for edema and pain control      Charges:  Timed Code Treatment Minutes: 45   Total Treatment Minutes: 55       [] EVAL (LOW) 05366 (typically 20 minutes face-to-face)  [] EVAL (MOD) 10015 (typically 30 minutes face-to-face)  [] EVAL (HIGH) 21805 (typically 45 minutes face-to-face)  [] RE-EVAL     [x] VM(14630) x 1   [] IONTO  [x] NMR (90924) x  1   [] VASO  [x] Manual (18701) x 1     [] Other:  [] TA x      [] Mech Traction (89411)  [] ES(attended) (42953)      [] ES (un) (89839):             GOALS:    Patient stated goal: to get mobility back  [] Progressing: [] Met: [] Not Met: [] Adjusted     Therapist goals for Patient:   Short Term Goals: To be achieved in: 2 weeks  1. Independent in HEP and progression per patient tolerance, in order to prevent re-injury. [] Progressing: [] Met: [] Not Met: [] Adjusted  2. Patient will have a decrease in pain to facilitate improvement in movement, function, and ADLs as indicated by Functional Deficits. [] Progressing: [] Met: [] Not Met: [] Adjusted     Long Term Goals: To be achieved in: 12 weeks        1. FOTO score to be equal or greater to the predicted score (65/100)  to assist with reaching prior level of function. [] Progressing: [] Met: [] Not Met: [] Adjusted      2. Patient will demonstrate increased AROM to equal the opposite side bilaterally to allow for reaching into upper kitchen cabinets as indicated by patients Functional Deficits. [] Progressing: [] Met: [] Not Met: [] Adjusted       3.  Patient will demonstrate an increase in strength to 4+/5 to allow for lifting paint buckets less than 5 lbs  as indicated by patients Functional Discharge      Electronically signed by:  Marii Okeefe PT, [id number]    Note: If patient does not return for scheduled/ recommended follow up visits, this note will serve as a discharge from care along with most recent update on progress.

## 2022-12-14 ENCOUNTER — HOSPITAL ENCOUNTER (OUTPATIENT)
Dept: PHYSICAL THERAPY | Age: 67
Setting detail: THERAPIES SERIES
Discharge: HOME | End: 2022-12-14
Payer: MEDICARE

## 2022-12-14 PROBLEM — G62.9 PERIPHERAL NEUROPATHY: Status: ACTIVE | Noted: 2022-12-14

## 2022-12-14 PROCEDURE — 97140 MANUAL THERAPY 1/> REGIONS: CPT | Performed by: PHYSICAL THERAPIST

## 2022-12-14 PROCEDURE — 97112 NEUROMUSCULAR REEDUCATION: CPT | Performed by: PHYSICAL THERAPIST

## 2022-12-14 PROCEDURE — 97110 THERAPEUTIC EXERCISES: CPT | Performed by: PHYSICAL THERAPIST

## 2022-12-14 NOTE — FLOWSHEET NOTE
[facility], [address]  Phone: [phone]  Fax [phone]  :  Physical Therapy Treatment Note/ Progress Report:           Date:  2022    Patient Name:  Catalino Rodríguez    :  1955  MRN: [id number]    Referring Physician: Denise Ruby MD/Pedro Agudelo                                                    Evaluation Date: 2022                                         Date of Referral:22     Insurance: [facility] Medicare BMN no auth     Next MD appt: scheduled:  none scheduled, to be in 4 weeks     Medical Diagnosis:  S46.011A (ICD-10-CM) - Traumatic complete tear of right rotator cuff, initial encounter     Treatment Diagnosis:  R  massive RCR and bicep tenodesis DOSx 10/26/22  R shoulder pain M25.511  R shoulder stiffness M215.611        Precautions/ Contra-indications: see below  Post operative per protocol:  Patient will wear an abduction pillow brace/sling for 6 weeks postop and be NWB for 6 weeks      Unless otherwise specified, do not begin PT until patient has seen M.D. for 2 weeks postop visit       Has the plan of care been signed (Y/N):        []  Yes  [x]  No    Progress report will be due (10 Rx or 30 days ):        Recertification will be due (POC Duration  / 90 days ): 23     Is this a Progress Report:     []  Yes  [x]  No      If Yes:  Date Range for reporting period:  Beginnin22  Ending:              Visit # Date Range Insurance Allowable Requires auth   IN PERSON 5 22- BMN    [x]no        []yes:   TELEHEALTH       TOTAL              CX/NS       Next PT appt: 22                PT Practice Pattern:D  Co morbidities:1-2  Surgical:R RCR and bicep tenodesis massive repair  DOS 10/26/22    INITIAL VISIT 22 CURRENT VISIT  22    PAIN 0-10/10 2/10 0-2   FUNCTIONAL SCALE FOTO 40/100    PROM SFLX 30 90  100 on 22    SER @ 0 ABD 0 15 Shoulder Abduction  60                     STRENGHT (MMT)                                              Latex Allergy/Pacemaker/Adhesive allergy:  [x]NO      []YES     RESTRICTIONS/PRECAUTIONS:   Patient will wear an abduction pillow brace/sling for 6 weeks postop and be NWB for 6 weeks     Unless otherwise specified, do not begin PT until patient has seen M.D. for 2 weeks postop visit     Phase I:     Weeks 0-4      May remove sling for gentle pendulum exercises 2-3 times per day      Elbows/hand gripping and range of motion exercises: Perform 4-6 times per day      Cryotherapy as needed      Rhythmic stabilization drills      Continue all isometric contractions and use of cryotherapy as needed      Initiate seated scapular isometrics      Screen posture      May begin joint mobilizations grade 1 and 2 for pain relief/relaxation          Weeks 4-5 (begin 11/28/22)     PROM/AAROM-- flexion to 90°, abduction to 90°, external rotation 30°, internal rotation 30°, extension 30°. (ER/IR in scapular plane, Flexion/Extension at 90° flexion in scapular plane)      ER/IR in scapular plane and at 90° adduction      Initiate strengthening using exercise tubing at 0° abduction (use towel roll under arm)      Initiate manual resistance ER in supine in scapular plane (light resistance)      Progress scapular strengthening      Initiate prone rowing with arm at 30° of adduction to neutral arm position      Initiate prone shoulder extension with the elbow flexed to 90°      Continue use of ice as needed.  May use heat prior to range of motion exercises      Rhythmic stabilization exercises (flexion at 45°, 90°, 100° and ER/IR at multiple angles)          Weeks 5-6 (12/13/22)     PROM/AAROM-flexion to 120°, abduction to 120°, external rotation to degrees, internal rotation 45°, extension 30      Joint mobilizations: Gentle scapular/glenohumeral joint mobilization as indicated to regain the full PROM      AAR in stretching exercises to gain full motion      Shoulder flexion      ER at 90° abduction      Initiate AROM exercises      Shoulder flexion in scapular plane to 90° of flexion      Shoulder abduction to 90°      Progress isotonic strengthening exercise program      IR/ER tubing (towel under arm)      Side lying ER (towel under arm)       Prone rowing at 45° adduction      Prone horizontal abduction (flexed elbow) at 90° adduction      Biceps curls isotonic slipped very light resistance      Slowly progressive strengthening to prevent i inflammation of tendon            SUBJECTIVE:  Patient reports the was released from the sling last week. He reports he has been a little sore since then, but Tylenol and ice help. He is 6 weeks post op at this time    Pain level:  0-2/10     OBJECTIVE:     Observation/palpation: hypomobile post/inf GH ,mobility            Exercises/Interventions:     HEP:  [employer] (see scanned forms)  Access Code: [email or web address]  URL: [email or web address]  Date: 11/28/2022  Prepared by: Naveen Resendiz Ex ([id number])  NMR re-education (59315) Reps/Sets/time Notes/CUES/Assessment HEP   Cueing for posture X    DC'd due to neck pain/headache X     X 12/5   Cueing to move body not actively move UE X              VC        x               Manual assist for scap X 12/5             Supine cane press BUE and FLX 15x5\"     Supine serratus punch 1x10     Supine short arc AROM flex/ext, horix abd/add 10x each     AAROM SL ER w towel 10x           The Finisher ladder, V, circles in/out 0# 10x each     Theratube rows Green 2x10     Theratube walkout ER/IR isometrics with towel roll Yellow 3 sec 2x10     Standing bicep curls 0# 2x10           Patient  5' HEP progression, ice, restrictions/precautions                Manual Intervention (69938)      STM deltoid, pec, Oscillations for pain, GH Joint mobilizations Gr I/II, PROM flexion, abduction, ER, IR within restrictions x18' Flexion and abd to 115 ER 45, IR < 30 Therapeutic Activity (03624)                  Therapeutic Exercise and NMR EXR  [x] (85821) Provided verbal/tactile cueing for activities related to strengthening, flexibility, endurance, ROM for improvements in LE, proximal hip, and core control with self care, mobility, lifting, ambulation. [x] (55371) Provided verbal/tactile cueing for activities related to improving balance, coordination, kinesthetic sense, posture, motor skill, proprioception  to assist with LE, proximal hip, and core control in self care, mobility, lifting, ambulation and eccentric single leg control.      NMR and Therapeutic Activities:    [x] (68445 or 82061) Provided verbal/tactile cueing for activities related to improving balance, coordination, kinesthetic sense, posture, motor skill, proprioception and motor activation to allow for proper function of core, proximal hip and LE with self care and ADLs  [] (90711) Gait Re-education- Provided training and instruction to the patient for proper LE, core and proximal hip recruitment and positioning and eccentric body weight control with ambulation re-education including up and down stairs     Home Exercise Program:    [x] (98279) Reviewed/Progressed HEP activities related to strengthening, flexibility, endurance, ROM of core, proximal hip and LE for functional self-care, mobility, lifting and ambulation/stair navigation   [] (34562)Reviewed/Progressed HEP activities related to improving balance, coordination, kinesthetic sense, posture, motor skill, proprioception of core, proximal hip and LE for self care, mobility, lifting, and ambulation/stair navigation      Manual Treatments:  PROM / STM / Oscillations-Mobs:  G-I, II, III, IV (PA's, Inf., Post.)  [x] (74571) Provided manual therapy to mobilize LE, proximal hip and/or LS spine soft tissue/joints for the purpose of modulating pain, promoting relaxation,  increasing ROM, reducing/eliminating soft tissue swelling/inflammation/restriction, improving soft tissue extensibility and allowing for proper ROM for normal function with self care, mobility, lifting and ambulation. Trigger point Dry Needling:  fine needle insertion into myofascial trigger points to stimulate a healing response  [] (53152) Needle insertion without injection: 1 or 2 muscles  [] (36662) Needle insertion without injection: 3 or more muscles    Modalities:  CP x 10'   [] GAME READY (VASO)- for significant edema, swelling, pain control. [] ESU (HV/PM) for edema and pain control      Charges:  Timed Code Treatment Minutes: 45   Total Treatment Minutes: 55       [] EVAL (LOW) 43884 (typically 20 minutes face-to-face)  [] EVAL (MOD) 11039 (typically 30 minutes face-to-face)  [] EVAL (HIGH) 99059 (typically 45 minutes face-to-face)  [] RE-EVAL     [x] FN(47586) x 1   [] IONTO  [x] NMR (08951) x  1   [] VASO  [x] Manual (69095) x 1     [] Other:  [] TA x      [] Mech Traction (62704)  [] ES(attended) (93461)      [] ES (un) (84544):             GOALS:    Patient stated goal: to get mobility back  [] Progressing: [] Met: [] Not Met: [] Adjusted     Therapist goals for Patient:   Short Term Goals: To be achieved in: 2 weeks  1. Independent in HEP and progression per patient tolerance, in order to prevent re-injury. [] Progressing: [] Met: [] Not Met: [] Adjusted  2. Patient will have a decrease in pain to facilitate improvement in movement, function, and ADLs as indicated by Functional Deficits. [] Progressing: [] Met: [] Not Met: [] Adjusted     Long Term Goals: To be achieved in: 12 weeks        1. FOTO score to be equal or greater to the predicted score (65/100)  to assist with reaching prior level of function. [] Progressing: [] Met: [] Not Met: [] Adjusted      2. Patient will demonstrate increased AROM to equal the opposite side bilaterally to allow for reaching into upper kitchen cabinets as indicated by patients Functional Deficits.    [] Progressing: [] Met: [] Not Met: [] Adjusted       3. Patient will demonstrate an increase in strength to 4+/5 to allow for lifting paint buckets less than 5 lbs  as indicated by patients Functional Deficits. [] Progressing: [] Met: [] Not Met: [] Adjusted       4. 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 (Pt specific functional or activity goal)Pt will be able to sleep w/o sleep aids or waking from shoulder pain. [] Progressing: [] Met: [] Not Met: [] Adjusted  6. Pt will demonstrate pain decreased by 50%  (0-1/10) with all ADLS. [] Progressing: [] Met: [] Not Met: [] Adjusted          ASSESSMENT: Pt tolerated increased progression of PROM and activity in PT today. He is 6 weeks post op and is able to begin progressing a little further per MD guidelines. Patient will benefit from continued skilled intervention to increase ROM, flexibility, strength and function. Overall Progression Towards Functional goals/ Treatment Progress Update:  [] Patient is progressing as expected towards functional goals listed. [] Progression is slowed due to complexities/Impairments listed. [] Progression has been slowed due to co-morbidities. [x] Plan just implemented, too soon to assess goals progression <30days   [] Goals require adjustment due to lack of progress  [] Patient is not progressing as expected and requires additional follow up with physician  [] Other    Prognosis for POC: [x] Good [] Fair  [] Poor      Patient requires continued skilled intervention: [x] Yes  [] No    Treatment/Activity Tolerance:  [x] Patient able to complete treatment  [] Patient limited by fatigue  [] Patient limited by pain    [] Patient limited by other medical complications  [] Other:     PLAN: Cont ROM and strength per protocol.  Pt to be seen 2x weekly. Pt is having difficulty scheduling due to other appts and his wife's schedule as well at this time. Attempt to get 120 degrees flexion NV. [x] Continue per plan of care [] Alter current plan (see comments above)  [] Plan of care initiated [] Hold pending MD visit [] Discharge      Electronically signed by:  Aura Starks, PT, [id number]    Note: If patient does not return for scheduled/ recommended follow up visits, this note will serve as a discharge from care along with most recent update on progress.

## 2022-12-15 ENCOUNTER — APPOINTMENT (OUTPATIENT)
Dept: PHYSICAL THERAPY | Age: 67
End: 2022-12-15
Payer: MEDICARE

## 2022-12-20 ENCOUNTER — HOSPITAL ENCOUNTER (OUTPATIENT)
Dept: PHYSICAL THERAPY | Age: 67
Setting detail: THERAPIES SERIES
Discharge: HOME | End: 2022-12-20
Payer: MEDICARE

## 2022-12-20 PROCEDURE — 97112 NEUROMUSCULAR REEDUCATION: CPT | Performed by: PHYSICAL THERAPIST

## 2022-12-20 PROCEDURE — 97140 MANUAL THERAPY 1/> REGIONS: CPT | Performed by: PHYSICAL THERAPIST

## 2022-12-20 PROCEDURE — 97110 THERAPEUTIC EXERCISES: CPT | Performed by: PHYSICAL THERAPIST

## 2022-12-20 NOTE — FLOWSHEET NOTE
[facility], [address]  Phone: [phone]  Fax [phone]  :  Physical Therapy Treatment Note/ Progress Report:           Date:  2022    Patient Name:  Candelario Mcclellan    :  1955  MRN: [id number]    Referring Physician: Linda Last MD/Pedro Gillette                                                    Evaluation Date: 2022                                         Date of Referral:22     Insurance: [facility] Medicare BMN no auth     Next MD appt: scheduled:  none scheduled, to be in 4 weeks     Medical Diagnosis:  S46.011A (ICD-10-CM) - Traumatic complete tear of right rotator cuff, initial encounter     Treatment Diagnosis:  R  massive RCR and bicep tenodesis DOSx 10/26/22  R shoulder pain M25.511  R shoulder stiffness M215.611        Precautions/ Contra-indications: see below  Post operative per protocol:  Patient will wear an abduction pillow brace/sling for 6 weeks postop and be NWB for 6 weeks      Unless otherwise specified, do not begin PT until patient has seen M.D. for 2 weeks postop visit       Has the plan of care been signed (Y/N):        []  Yes  [x]  No    Progress report will be due (10 Rx or 30 days ):   DUE visit 10 or        Recertification will be due (POC Duration  / 90 days ): 23     Is this a Progress Report:     []  Yes  [x]  No      If Yes:  Date Range for reporting period:  Beginnin22  Ending:              Visit # Date Range Insurance Allowable Requires auth   IN PERSON 6 22- BMN    [x]no        []yes:   TELEHEALTH       TOTAL              CX/NS       Next PT appt: 22                PT Practice Pattern:D  Co morbidities:1-2  Surgical:R RCR and bicep tenodesis massive repair  DOS 10/26/22    INITIAL VISIT 22 CURRENT VISIT  22    PAIN 0-10/10 2/10 0-2   FUNCTIONAL SCALE FOTO 40/100    PROM SFLX 30 140 on     SER @ 0 ABD 0 15    Shoulder Abduction  60                     STRENGHT (MMT)                                              Latex Allergy/Pacemaker/Adhesive allergy:  [x]NO      []YES     RESTRICTIONS/PRECAUTIONS:   Patient will wear an abduction pillow brace/sling for 6 weeks postop and be NWB for 6 weeks     Unless otherwise specified, do not begin PT until patient has seen M.D. for 2 weeks postop visit     Phase I:     Weeks 0-4      May remove sling for gentle pendulum exercises 2-3 times per day      Elbows/hand gripping and range of motion exercises: Perform 4-6 times per day      Cryotherapy as needed      Rhythmic stabilization drills      Continue all isometric contractions and use of cryotherapy as needed      Initiate seated scapular isometrics      Screen posture      May begin joint mobilizations grade 1 and 2 for pain relief/relaxation          Weeks 4-5 (begin 11/28/22)     PROM/AAROM-- flexion to 90°, abduction to 90°, external rotation 30°, internal rotation 30°, extension 30°. (ER/IR in scapular plane, Flexion/Extension at 90° flexion in scapular plane)      ER/IR in scapular plane and at 90° adduction      Initiate strengthening using exercise tubing at 0° abduction (use towel roll under arm)      Initiate manual resistance ER in supine in scapular plane (light resistance)      Progress scapular strengthening      Initiate prone rowing with arm at 30° of adduction to neutral arm position      Initiate prone shoulder extension with the elbow flexed to 90°      Continue use of ice as needed.  May use heat prior to range of motion exercises      Rhythmic stabilization exercises (flexion at 45°, 90°, 100° and ER/IR at multiple angles)          Weeks 5-6 (12/13/22)     PROM/AAROM-flexion to 120°, abduction to 120°, external rotation to degrees, internal rotation 45°, extension 30      Joint mobilizations: Gentle scapular/glenohumeral joint mobilization as indicated to regain the full PROM      AAR in stretching exercises to gain full motion      Shoulder flexion      ER at 90° abduction      Initiate AROM exercises      Shoulder flexion in scapular plane to 90° of flexion      Shoulder abduction to 90°      Progress isotonic strengthening exercise program      IR/ER tubing (towel under arm)      Side lying ER (towel under arm)       Prone rowing at 45° adduction      Prone horizontal abduction (flexed elbow) at 90° adduction      Biceps curls isotonic slipped very light resistance      Slowly progressive strengthening to prevent i inflammation of tendon            SUBJECTIVE:  Pt reports some discomfort no real pain. Mostly in his UT. He drove his manual transmission vehicle today. When asked about activity, pt reports that  he has been using his RUE at home fot more then ADLs including carrying an overnight bag. Pain level:  0-2/10     OBJECTIVE:     Observation/palpation: hypomobile post/inf GH ,mobility  Very tight R>L pec, extremely forward R shoulder and anterior placed humeral head. Exercises/Interventions:     HEP:  [employer] (see scanned forms)  Access Code: [email or web address]  URL: [email or web address]  Date: 11/28/2022  Prepared by: Otto Schaffer Ex ([id number])  NMR re-education (92050) Reps/Sets/time Notes/CUES/Assessment HEP   Cueing for posture X    DC'd due to neck pain/headache X     X 12/5   Cueing to move body not actively move UE X              Prone rows 2x15x3\"\"  2# MC/VC for form and scap set    Prone EXT 2x10x3\"  1#     x   25-50% submax isos  Supine IR/ER   5x5\" ea     Supine IR/ER AROM      Tricep ext prone 2x10x3\"  2#         Manual assist for scap X 12/5             Supine cane press BUE and FLX w towel roll 15x5\"     Supine serratus punch Cane w 1#  15x5\" focus ECC     Supine short arc AROM flex/ext, horix abd/add 10x each                   Theratube rows Green  R/L stagger stance  15x3\" ea     Theratube walkout ER/IR isometrics with towel roll RTB 3 sec 2x10 Standing bicep curls 0# 2x10     Supine 2 position pec stretch w towel roll 2x1'                       Manual Intervention ([id number])      STM deltoid, pec, Oscillations for pain, GH Joint mobilizations Gr I/II, PROM flexion, abduction, ER, IR within restrictions x8 Flexion and abd to 120  IR to 45                      Therapeutic Activity (24631)                  Therapeutic Exercise and NMR EXR  [x] (45149) Provided verbal/tactile cueing for activities related to strengthening, flexibility, endurance, ROM for improvements in LE, proximal hip, and core control with self care, mobility, lifting, ambulation. [x] (25759) Provided verbal/tactile cueing for activities related to improving balance, coordination, kinesthetic sense, posture, motor skill, proprioception  to assist with LE, proximal hip, and core control in self care, mobility, lifting, ambulation and eccentric single leg control.      NMR and Therapeutic Activities:    [x] (50669 or 77535) Provided verbal/tactile cueing for activities related to improving balance, coordination, kinesthetic sense, posture, motor skill, proprioception and motor activation to allow for proper function of core, proximal hip and LE with self care and ADLs  [] (89479) Gait Re-education- Provided training and instruction to the patient for proper LE, core and proximal hip recruitment and positioning and eccentric body weight control with ambulation re-education including up and down stairs     Home Exercise Program:    [x] (58351) Reviewed/Progressed HEP activities related to strengthening, flexibility, endurance, ROM of core, proximal hip and LE for functional self-care, mobility, lifting and ambulation/stair navigation   [] (12830)Reviewed/Progressed HEP activities related to improving balance, coordination, kinesthetic sense, posture, motor skill, proprioception of core, proximal hip and LE for self care, mobility, lifting, and ambulation/stair navigation      Manual Treatments: PROM / STM / Oscillations-Mobs:  G-I, II, III, IV (PA's, Inf., Post.)  [x] (54454) Provided manual therapy to mobilize LE, proximal hip and/or LS spine soft tissue/joints for the purpose of modulating pain, promoting relaxation,  increasing ROM, reducing/eliminating soft tissue swelling/inflammation/restriction, improving soft tissue extensibility and allowing for proper ROM for normal function with self care, mobility, lifting and ambulation. Trigger point Dry Needling:  fine needle insertion into myofascial trigger points to stimulate a healing response  [] (69289) Needle insertion without injection: 1 or 2 muscles  [] (93203) Needle insertion without injection: 3 or more muscles    Modalities:  CP x 10'   [] GAME READY (VASO)- for significant edema, swelling, pain control. [] ESU (HV/PM) for edema and pain control      Charges:  Timed Code Treatment Minutes: 50   Total Treatment Minutes: 50       [] EVAL (LOW) 83573 (typically 20 minutes face-to-face)  [] EVAL (MOD) 87054 (typically 30 minutes face-to-face)  [] EVAL (HIGH) 33871 (typically 45 minutes face-to-face)  [] RE-EVAL     [x] QC(49160) x 1   [] IONTO  [x] NMR (05402) x  1   [] VASO  [x] Manual (48560) x 1     [] Other:  [] TA x      [] Mech Traction (94174)  [] ES(attended) (93677)      [] ES (un) (07934):             GOALS:    Patient stated goal: to get mobility back  [] Progressing: [] Met: [] Not Met: [] Adjusted     Therapist goals for Patient:   Short Term Goals: To be achieved in: 2 weeks  1. Independent in HEP and progression per patient tolerance, in order to prevent re-injury. [] Progressing: [] Met: [] Not Met: [] Adjusted  2. Patient will have a decrease in pain to facilitate improvement in movement, function, and ADLs as indicated by Functional Deficits. [] Progressing: [] Met: [] Not Met: [] Adjusted     Long Term Goals: To be achieved in: 12 weeks        1.  FOTO score to be equal or greater to the predicted score (65/100)  to assist with reaching prior level of function. [] Progressing: [] Met: [] Not Met: [] Adjusted      2. Patient will demonstrate increased AROM to equal the opposite side bilaterally to allow for reaching into upper kitchen cabinets as indicated by patients Functional Deficits. [] Progressing: [] Met: [] Not Met: [] Adjusted       3. Patient will demonstrate an increase in strength to 4+/5 to allow for lifting paint buckets less than 5 lbs  as indicated by patients Functional Deficits. [] Progressing: [] Met: [] Not Met: [] Adjusted       4. 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 (Pt specific functional or activity goal)Pt will be able to sleep w/o sleep aids or waking from shoulder pain. [] Progressing: [] Met: [] Not Met: [] Adjusted  6. Pt will demonstrate pain decreased by 50%  (0-1/10) with all ADLS. [] Progressing: [] Met: [] Not Met: [] Adjusted          ASSESSMENT: Pt tolerated increased progression of PROM and activity in PT today. He is 6 weeks post op and is able to begin progressing a little further per MD guidelines. Patient will benefit from continued skilled intervention to increase ROM, flexibility, strength and function. Overall Progression Towards Functional goals/ Treatment Progress Update:  [] Patient is progressing as expected towards functional goals listed. [] Progression is slowed due to complexities/Impairments listed. [] Progression has been slowed due to co-morbidities.   [x] Plan just implemented, too soon to assess goals progression <30days   [] Goals require adjustment due to lack of progress  [] Patient is not progressing as expected and requires additional follow up with physician  [] Other    Prognosis for POC: [x] Good [] Fair  [] Poor      Patient requires continued skilled intervention: [x] Yes  [] No    Treatment/Activity Tolerance:  [x] Patient able to complete treatment  [] Patient limited by fatigue  [] Patient limited by pain    [] Patient limited by other medical complications  [] Other:     PLAN: PN NV. .    [x] Continue per plan of care [] Alter current plan (see comments above)  [] Plan of care initiated [] Hold pending MD visit [] Discharge      Electronically signed by:  Neyda Goins, PT, [id number]    Note: If patient does not return for scheduled/ recommended follow up visits, this note will serve as a discharge from care along with most recent update on progress.

## 2022-12-22 ENCOUNTER — APPOINTMENT (OUTPATIENT)
Dept: PHYSICAL THERAPY | Age: 67
End: 2022-12-22
Payer: MEDICARE

## 2022-12-22 ENCOUNTER — HOSPITAL ENCOUNTER (OUTPATIENT)
Dept: PHYSICAL THERAPY | Age: 67
Setting detail: THERAPIES SERIES
Discharge: HOME | End: 2022-12-22
Payer: MEDICARE

## 2022-12-22 PROCEDURE — 97110 THERAPEUTIC EXERCISES: CPT | Performed by: PHYSICAL THERAPIST

## 2022-12-22 PROCEDURE — 97140 MANUAL THERAPY 1/> REGIONS: CPT | Performed by: PHYSICAL THERAPIST

## 2022-12-22 PROCEDURE — 97112 NEUROMUSCULAR REEDUCATION: CPT | Performed by: PHYSICAL THERAPIST

## 2022-12-22 NOTE — FLOWSHEET NOTE
[facility], [address]  Phone: [phone]  Fax [phone]  :  Physical Therapy Treatment Note/ Progress Report:           Date:  2022    Patient Name:  Diego Pascual    :  1955  MRN: [id number]    Referring Physician: Nathalie Cui MD/Pedro Felix                                                    Evaluation Date: 2022                                         Date of Referral:22     Insurance: [facility] Medicare BMN no auth     Next MD appt: scheduled:  none scheduled, to be in 4 weeks     Medical Diagnosis:  S46.011A (ICD-10-CM) - Traumatic complete tear of right rotator cuff, initial encounter     Treatment Diagnosis:  R  massive RCR and bicep tenodesis DOSx 10/26/22  R shoulder pain M25.511  R shoulder stiffness M215.611        Precautions/ Contra-indications: see below  Post operative per protocol:  Patient will wear an abduction pillow brace/sling for 6 weeks postop and be NWB for 6 weeks      Unless otherwise specified, do not begin PT until patient has seen M.D. for 2 weeks postop visit       Has the plan of care been signed (Y/N):        []  Yes  [x]  No    Progress report will be due (10 Rx or 30 days ):   DUE visit 10 or        Recertification will be due (POC Duration  / 90 days ): 23     Is this a Progress Report:     []  Yes  [x]  No      If Yes:  Date Range for reporting period:  Beginnin22  Ending:              Visit # Date Range Insurance Allowable Requires auth   IN PERSON 7 22- BMN    [x]no        []yes:   TELEHEALTH       TOTAL              CX/NS       Next PT appt:                 PT Practice Pattern:D  Co morbidities:1-2  Surgical:R RCR and bicep tenodesis massive repair  DOS 10/26/22    INITIAL VISIT 22 CURRENT VISIT  22    PAIN 0-10/10 2/10 0-2   FUNCTIONAL SCALE FOTO 40/100    PROM SFLX 30 140 on     SER @ 0 ABD 0 15    Shoulder Abduction  60                     STRENGHT (MMT)                                              Latex Allergy/Pacemaker/Adhesive allergy:  [x]NO      []YES     RESTRICTIONS/PRECAUTIONS:   Patient will wear an abduction pillow brace/sling for 6 weeks postop and be NWB for 6 weeks     Unless otherwise specified, do not begin PT until patient has seen M.D. for 2 weeks postop visit     Phase I:     Weeks 0-4      May remove sling for gentle pendulum exercises 2-3 times per day      Elbows/hand gripping and range of motion exercises: Perform 4-6 times per day      Cryotherapy as needed      Rhythmic stabilization drills      Continue all isometric contractions and use of cryotherapy as needed      Initiate seated scapular isometrics      Screen posture      May begin joint mobilizations grade 1 and 2 for pain relief/relaxation          Weeks 4-5 (begin 11/28/22)     PROM/AAROM-- flexion to 90°, abduction to 90°, external rotation 30°, internal rotation 30°, extension 30°. (ER/IR in scapular plane, Flexion/Extension at 90° flexion in scapular plane)      ER/IR in scapular plane and at 90° adduction      Initiate strengthening using exercise tubing at 0° abduction (use towel roll under arm)      Initiate manual resistance ER in supine in scapular plane (light resistance)      Progress scapular strengthening      Initiate prone rowing with arm at 30° of adduction to neutral arm position      Initiate prone shoulder extension with the elbow flexed to 90°      Continue use of ice as needed.  May use heat prior to range of motion exercises      Rhythmic stabilization exercises (flexion at 45°, 90°, 100° and ER/IR at multiple angles)          Weeks 5-6 (12/13/22)     PROM/AAROM-flexion to 120°, abduction to 120°, external rotation to degrees, internal rotation 45°, extension 30      Joint mobilizations: Gentle scapular/glenohumeral joint mobilization as indicated to regain the full PROM      AAR in stretching exercises to gain full motion      Shoulder flexion      ER at 90° abduction      Initiate AROM exercises      Shoulder flexion in scapular plane to 90° of flexion      Shoulder abduction to 90°      Progress isotonic strengthening exercise program      IR/ER tubing (towel under arm)      Side lying ER (towel under arm)       Prone rowing at 45° adduction      Prone horizontal abduction (flexed elbow) at 90° adduction      Biceps curls isotonic slipped very light resistance      Slowly progressive strengthening to prevent i inflammation of tendon            SUBJECTIVE:  Pt reports some discomfort no real pain. He has been doing some drafting at home on his table and  computer for about 2 hours yesterday. Pain level:  0-2/10     OBJECTIVE:     Observation/palpation: hypomobile post/inf GH ,mobility  Very tight R>L pec, extremely forward R shoulder and anterior placed humeral head. TPs in R pec minor/major  Pt has significantly poor positioning of B shoulders due to postural issues, with severely displaced R positioning. Exercises/Interventions:     HEP:  [employer] (see scanned forms)  Access Code: [email or web address]  URL: [employer]/  Date: 11/28/2022  Prepared by: Alonso Sanchez      Therapeutic Ex (63152)  NMR re-education (79649) Reps/Sets/time Notes/CUES/Assessment HEP   Cueing for posture X    DC'd due to neck pain/headache X     X 12/5   Cueing to move body not actively move UE X              MC/VC for form and scap set        x             Tricep ext prone 2x10x3\"  2#     Assisted SL scap set 15x5\"     Manual assist for scap X 12/5               Supine cane press BUE and FLX w towel roll 15x5\"     Supine serratus punch Cane w 1#  15x5\" focus ECC         SL ER w towel 2x10x3\"               Theratube rows Green  R/L stagger stance  15x3\" ea             Supine 2 position pec stretch w towel roll 2x1'     SB rolls on table for FLX 10x10\"     Band rows stagger stance R/L GTB 15x3\" ea VC/MC Band pull downs seated BTB 2x10x3\"                       Manual Intervention (28241)      STM deltoid, pec, Oscillations for pain, GH Joint mobilizations Gr I/II, PROM flexion, abduction, ER, IR within restrictions, pec release x15' Flexion and abd to 120  IR to 45                      Therapeutic Activity (04130)                  Therapeutic Exercise and NMR EXR  [x] (71979) Provided verbal/tactile cueing for activities related to strengthening, flexibility, endurance, ROM for improvements in LE, proximal hip, and core control with self care, mobility, lifting, ambulation. [x] (47998) Provided verbal/tactile cueing for activities related to improving balance, coordination, kinesthetic sense, posture, motor skill, proprioception  to assist with LE, proximal hip, and core control in self care, mobility, lifting, ambulation and eccentric single leg control.      NMR and Therapeutic Activities:    [x] (46023 or 01505) Provided verbal/tactile cueing for activities related to improving balance, coordination, kinesthetic sense, posture, motor skill, proprioception and motor activation to allow for proper function of core, proximal hip and LE with self care and ADLs  [] (53220) Gait Re-education- Provided training and instruction to the patient for proper LE, core and proximal hip recruitment and positioning and eccentric body weight control with ambulation re-education including up and down stairs     Home Exercise Program:    [x] (25833) Reviewed/Progressed HEP activities related to strengthening, flexibility, endurance, ROM of core, proximal hip and LE for functional self-care, mobility, lifting and ambulation/stair navigation   [] (17594)Reviewed/Progressed HEP activities related to improving balance, coordination, kinesthetic sense, posture, motor skill, proprioception of core, proximal hip and LE for self care, mobility, lifting, and ambulation/stair navigation      Manual Treatments:  PROM / STM / Oscillations-Mobs:  G-I, II, III, IV (PA's, Inf., Post.)  [x] (21720) Provided manual therapy to mobilize LE, proximal hip and/or LS spine soft tissue/joints for the purpose of modulating pain, promoting relaxation,  increasing ROM, reducing/eliminating soft tissue swelling/inflammation/restriction, improving soft tissue extensibility and allowing for proper ROM for normal function with self care, mobility, lifting and ambulation. Trigger point Dry Needling:  fine needle insertion into myofascial trigger points to stimulate a healing response  [] (38222) Needle insertion without injection: 1 or 2 muscles  [] (34390) Needle insertion without injection: 3 or more muscles    Modalities:  declined   [] GAME READY (VASO)- for significant edema, swelling, pain control. [] ESU (HV/PM) for edema and pain control      Charges:  Timed Code Treatment Minutes: 40   Total Treatment Minutes: 40       [] EVAL (LOW) 43597 (typically 20 minutes face-to-face)  [] EVAL (MOD) 17664 (typically 30 minutes face-to-face)  [] EVAL (HIGH) 14271 (typically 45 minutes face-to-face)  [] RE-EVAL     [x] WX(39029) x 1   [] IONTO  [x] NMR (94788) x  1   [] VASO  [x] Manual (26556) x 1     [] Other:  [] TA x      [] Mech Traction (46153)  [] ES(attended) (69427)      [] ES (un) (27733):             GOALS:    Patient stated goal: to get mobility back  [] Progressing: [] Met: [] Not Met: [] Adjusted     Therapist goals for Patient:   Short Term Goals: To be achieved in: 2 weeks  1. Independent in HEP and progression per patient tolerance, in order to prevent re-injury. [] Progressing: [] Met: [] Not Met: [] Adjusted  2. Patient will have a decrease in pain to facilitate improvement in movement, function, and ADLs as indicated by Functional Deficits. [] Progressing: [] Met: [] Not Met: [] Adjusted     Long Term Goals: To be achieved in: 12 weeks        1.  FOTO score to be equal or greater to the predicted score (65/100)  to assist with reaching prior level of function. [] Progressing: [] Met: [] Not Met: [] Adjusted      2. Patient will demonstrate increased AROM to equal the opposite side bilaterally to allow for reaching into upper kitchen cabinets as indicated by patients Functional Deficits. [] Progressing: [] Met: [] Not Met: [] Adjusted       3. Patient will demonstrate an increase in strength to 4+/5 to allow for lifting paint buckets less than 5 lbs  as indicated by patients Functional Deficits. [] Progressing: [] Met: [] Not Met: [] Adjusted       4. 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 (Pt specific functional or activity goal)Pt will be able to sleep w/o sleep aids or waking from shoulder pain. [] Progressing: [] Met: [] Not Met: [] Adjusted  6. Pt will demonstrate pain decreased by 50%  (0-1/10) with all ADLS. [] Progressing: [] Met: [] Not Met: [] Adjusted          ASSESSMENT: Pt tolerated session well. Patient will benefit from continued skilled intervention to increase ROM, flexibility, strength and function. Overall Progression Towards Functional goals/ Treatment Progress Update:  [] Patient is progressing as expected towards functional goals listed. [] Progression is slowed due to complexities/Impairments listed. [] Progression has been slowed due to co-morbidities.   [x] Plan just implemented, too soon to assess goals progression <30days   [] Goals require adjustment due to lack of progress  [] Patient is not progressing as expected and requires additional follow up with physician  [] Other    Prognosis for POC: [x] Good [] Fair  [] Poor      Patient requires continued skilled intervention: [x] Yes  [] No    Treatment/Activity Tolerance:  [x] Patient able to complete treatment  [] Patient limited by fatigue  [] Patient limited by pain    [] Patient limited by other medical complications  [] Other:     PLAN: PN NV. FOTO NV. [x] Continue per plan of care [] Alter current plan (see comments above)  [] Plan of care initiated [] Hold pending MD visit [] Discharge      Electronically signed by:  Elenor Mohs, PT, [id number]    Note: If patient does not return for scheduled/ recommended follow up visits, this note will serve as a discharge from care along with most recent update on progress.

## 2022-12-27 ENCOUNTER — APPOINTMENT (OUTPATIENT)
Dept: PHYSICAL THERAPY | Age: 67
End: 2022-12-27
Payer: MEDICARE

## 2022-12-27 ENCOUNTER — HOSPITAL ENCOUNTER (OUTPATIENT)
Dept: PHYSICAL THERAPY | Age: 67
Setting detail: THERAPIES SERIES
Discharge: HOME | End: 2022-12-27
Payer: MEDICARE

## 2022-12-27 PROCEDURE — 97112 NEUROMUSCULAR REEDUCATION: CPT | Performed by: PHYSICAL THERAPIST

## 2022-12-27 PROCEDURE — 97110 THERAPEUTIC EXERCISES: CPT | Performed by: PHYSICAL THERAPIST

## 2022-12-27 PROCEDURE — 97140 MANUAL THERAPY 1/> REGIONS: CPT | Performed by: PHYSICAL THERAPIST

## 2022-12-27 NOTE — PROGRESS NOTES
[facility], [address]  Phone: [phone]  Fax [phone]  :  Physical Therapy Treatment Note/ Progress Report:           Date:  2022    Patient Name:  Martha Alejandro    :  1955  MRN: [id number]    Referring Physician: Buddy Reed MD/Pedro Rivera                                                    Evaluation Date: 2022                                         Date of Referral:22     Insurance: [facility] Medicare BMN no auth     Next MD appt: scheduled:  none scheduled, to be in 4 weeks     Medical Diagnosis:  S46.011A (ICD-10-CM) - Traumatic complete tear of right rotator cuff, initial encounter     Treatment Diagnosis:  R  massive RCR and bicep tenodesis DOSx 10/26/22  R shoulder pain M25.511  R shoulder stiffness M215.611        Precautions/ Contra-indications: see below  Post operative per protocol:  Patient will wear an abduction pillow brace/sling for 6 weeks postop and be NWB for 6 weeks      Unless otherwise specified, do not begin PT until patient has seen M.D. for 2 weeks postop visit       Has the plan of care been signed (Y/N):        []  Yes  [x]  No    Progress report will be due (10 Rx or 30 days ):   DUE visit 10 or        Recertification will be due (POC Duration  / 90 days ): 23     Is this a Progress Report:     [x]  Yes  []  No      If Yes:  Date Range for reporting period:  Beginnin22  Endin22              Visit # Date Range Insurance Allowable Requires auth   IN PERSON 8 22- BMN    [x]no        []yes:   TELEHEALTH       TOTAL              CX/NS       Next PT appt:                 PT Practice Pattern:D  Co morbidities:1-2  Surgical:R RCR and bicep tenodesis massive repair  DOS 10/26/22    INITIAL VISIT 22 CURRENT VISIT  22    PAIN 0-10/10 2/10 0-5   FUNCTIONAL SCALE FOTO 40/100 59/100   PROM SFLX 30 140    SER  0 at 0 deg abd 60 deg at 45-50 deg abd    Shoulder Abduction     AROM Shoulder flexion  90    Shoulder abd  90    Shoulder ER           STRENGHT (MMT)   DNT                                           Latex Allergy/Pacemaker/Adhesive allergy:  [x]NO      []YES     RESTRICTIONS/PRECAUTIONS:   Patient will wear an abduction pillow brace/sling for 6 weeks postop and be NWB for 6 weeks     Unless otherwise specified, do not begin PT until patient has seen M.D. for 2 weeks postop visit     Phase I:     Weeks 0-4      May remove sling for gentle pendulum exercises 2-3 times per day      Elbows/hand gripping and range of motion exercises: Perform 4-6 times per day      Cryotherapy as needed      Rhythmic stabilization drills      Continue all isometric contractions and use of cryotherapy as needed      Initiate seated scapular isometrics      Screen posture      May begin joint mobilizations grade 1 and 2 for pain relief/relaxation          Weeks 4-5 (begin 11/28/22)     PROM/AAROM-- flexion to 90°, abduction to 90°, external rotation 30°, internal rotation 30°, extension 30°. (ER/IR in scapular plane, Flexion/Extension at 90° flexion in scapular plane)      ER/IR in scapular plane and at 90° adduction      Initiate strengthening using exercise tubing at 0° abduction (use towel roll under arm)      Initiate manual resistance ER in supine in scapular plane (light resistance)      Progress scapular strengthening      Initiate prone rowing with arm at 30° of adduction to neutral arm position      Initiate prone shoulder extension with the elbow flexed to 90°      Continue use of ice as needed.  May use heat prior to range of motion exercises      Rhythmic stabilization exercises (flexion at 45°, 90°, 100° and ER/IR at multiple angles)          Weeks 5-6 (12/13/22)     PROM/AAROM-flexion to 120°, abduction to 120°, external rotation to degrees, internal rotation 45°, extension 30      Joint mobilizations: Gentle scapular/glenohumeral joint mobilization as indicated to regain the full PROM      AAR in stretching exercises to gain full motion      Shoulder flexion      ER at 90° abduction      Initiate AROM exercises      Shoulder flexion in scapular plane to 90° of flexion      Shoulder abduction to 90°      Progress isotonic strengthening exercise program      IR/ER tubing (towel under arm)      Side lying ER (towel under arm)       Prone rowing at 45° adduction      Prone horizontal abduction (flexed elbow) at 90° adduction      Biceps curls isotonic slipped very light resistance      Slowly progressive strengthening to prevent i inflammation of tendon            SUBJECTIVE:  Patient reports his shoulder is more painful and achy today. Reports that he did some things he shouldn't have over the weekend, such as pushing a . Pain level:  0-5/10     OBJECTIVE:     Observation/palpation: hypomobile post/inf GH ,mobility  Very tight R>L pec, extremely forward R shoulder and anterior placed humeral head. TPs in R pec minor/major  Pt has significantly poor positioning of B shoulders due to postural issues, with severely displaced R positioning. Exercises/Interventions:     HEP:  [employer] (see scanned forms)  Access Code: [email or web address]  URL: Circle/  Date: 11/28/2022  Prepared by: Nicolasa Kendall Ex ([id number])  NMR re-education (26081) Reps/Sets/time Notes/CUES/Assessment HEP   Cueing for posture X    DC'd due to neck pain/headache X    Table slide FLX BUE 10 x 10\"  X 12/5   Cueing to move body not actively move UE X              MC/VC for form and scap set        x                     Manual assist for scap X 12/5               Supine cane press BUE and FLX w towel roll 15x5\"     Supine serratus punch Cane w 2#  15x5\" focus ECC         SL ER w towel 2x10x3\" Cueing for scap position    SL abd to 90 2 x 5 Cueing for scap position    Supine flexion AROM to 90 in scap plane 2 x 5 Cueing for scap position                            Band rows stagger stance R/L GTB 15x3\" ea VC/MC    Band pull downs seated BTB 2x10x3\"                       Manual Intervention (96253)      STM  pec, Oscillations for pain, GH Joint mobilizations Gr II/III, PROM flexion, abduction, ER, IR within restrictions, pec release x15'                       Therapeutic Activity (99321)                  Therapeutic Exercise and NMR EXR  [x] (58174) Provided verbal/tactile cueing for activities related to strengthening, flexibility, endurance, ROM for improvements in LE, proximal hip, and core control with self care, mobility, lifting, ambulation. [x] (73496) Provided verbal/tactile cueing for activities related to improving balance, coordination, kinesthetic sense, posture, motor skill, proprioception  to assist with LE, proximal hip, and core control in self care, mobility, lifting, ambulation and eccentric single leg control.      NMR and Therapeutic Activities:    [x] (91027 or 58431) Provided verbal/tactile cueing for activities related to improving balance, coordination, kinesthetic sense, posture, motor skill, proprioception and motor activation to allow for proper function of core, proximal hip and LE with self care and ADLs  [] (53007) Gait Re-education- Provided training and instruction to the patient for proper LE, core and proximal hip recruitment and positioning and eccentric body weight control with ambulation re-education including up and down stairs     Home Exercise Program:    [x] (46070) Reviewed/Progressed HEP activities related to strengthening, flexibility, endurance, ROM of core, proximal hip and LE for functional self-care, mobility, lifting and ambulation/stair navigation   [] (89827)Reviewed/Progressed HEP activities related to improving balance, coordination, kinesthetic sense, posture, motor skill, proprioception of core, proximal hip and LE for self care, mobility, lifting, and ambulation/stair navigation      Manual Treatments:  PROM / STM / Oscillations-Mobs:  G-I, II, III, IV (PA's, Inf., Post.)  [x] (77876) Provided manual therapy to mobilize LE, proximal hip and/or LS spine soft tissue/joints for the purpose of modulating pain, promoting relaxation,  increasing ROM, reducing/eliminating soft tissue swelling/inflammation/restriction, improving soft tissue extensibility and allowing for proper ROM for normal function with self care, mobility, lifting and ambulation. Trigger point Dry Needling:  fine needle insertion into myofascial trigger points to stimulate a healing response  [] (12374) Needle insertion without injection: 1 or 2 muscles  [] (15440) Needle insertion without injection: 3 or more muscles    Modalities:  CP x 10'   [] GAME READY (VASO)- for significant edema, swelling, pain control. [] ESU (HV/PM) for edema and pain control      Charges:  Timed Code Treatment Minutes: 40'   Total Treatment Minutes: 48'       [] EVAL (LOW) 455 [id number] (typically 20 minutes face-to-face)  [] EVAL (MOD) 31719 (typically 30 minutes face-to-face)  [] EVAL (HIGH) 37278 (typically 45 minutes face-to-face)  [] RE-EVAL     [x] GJ(62316) x 1   [] IONTO  [x] NMR (23287) x  1   [] VASO  [x] Manual (17500) x 1     [] Other:  [] TA x      [] Mech Traction (03804)  [] ES(attended) (17599)      [] ES (un) (40318):             GOALS:    Patient stated goal: to get mobility back  [] Progressing: [] Met: [] Not Met: [] Adjusted     Therapist goals for Patient:   Short Term Goals: To be achieved in: 2 weeks  1. Independent in HEP and progression per patient tolerance, in order to prevent re-injury. [] Progressing: [] Met: [] Not Met: [] Adjusted  2. Patient will have a decrease in pain to facilitate improvement in movement, function, and ADLs as indicated by Functional Deficits. [] Progressing: [] Met: [] Not Met: [] Adjusted     Long Term Goals: To be achieved in: 12 weeks        1.  FOTO score to be equal or greater to the predicted score (65/100)  to assist with reaching prior level of function. [] Progressing: [] Met: [] Not Met: [] Adjusted      2. Patient will demonstrate increased AROM to equal the opposite side bilaterally to allow for reaching into upper kitchen cabinets as indicated by patients Functional Deficits. [] Progressing: [] Met: [] Not Met: [] Adjusted       3. Patient will demonstrate an increase in strength to 4+/5 to allow for lifting paint buckets less than 5 lbs  as indicated by patients Functional Deficits. [] Progressing: [] Met: [] Not Met: [] Adjusted       4. 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 (Pt specific functional or activity goal)Pt will be able to sleep w/o sleep aids or waking from shoulder pain. [] Progressing: [] Met: [] Not Met: [] Adjusted  6. Pt will demonstrate pain decreased by 50%  (0-1/10) with all ADLS. [] Progressing: [] Met: [] Not Met: [] Adjusted          ASSESSMENT: Patient with improved PROM since last PN. Continues to demonstrate poor posture and needs cueing throughout each exercise to keep scapular retraction for improved shoulder mechanics. Demonstrates most tightness into flexion at this time. Patient educated on avoiding heavier activities such as using a   and an overall gradual increase in activity to protect repair. Patient continues to use his arm in ways that he should not at this point in his recovery. Overall Progression Towards Functional goals/ Treatment Progress Update:  [] Patient is progressing as expected towards functional goals listed. [] Progression is slowed due to complexities/Impairments listed. [] Progression has been slowed due to co-morbidities.   [x] Plan just implemented, too soon to assess goals progression <30days   [] Goals require adjustment due to lack of progress  [] Patient is not progressing as expected and requires additional follow up with physician  [] Other    Prognosis for POC: [x] Good [] Fair  [] Poor      Patient requires continued skilled intervention: [x] Yes  [] No    Treatment/Activity Tolerance:  [x] Patient able to complete treatment  [] Patient limited by fatigue  [] Patient limited by pain    [] Patient limited by other medical complications  [] Other:     PLAN:     [x] Continue per plan of care [] Alter current plan (see comments above)  [] Plan of care initiated [] Hold pending MD visit [] Discharge      Electronically signed by:  Manjit Linton, PT, DPT [id number]     Note: If patient does not return for scheduled/ recommended follow up visits, this note will serve as a discharge from care along with most recent update on progress.

## 2022-12-29 ENCOUNTER — HOSPITAL ENCOUNTER (OUTPATIENT)
Dept: PHYSICAL THERAPY | Age: 67
Setting detail: THERAPIES SERIES
Discharge: HOME | End: 2022-12-29
Payer: MEDICARE

## 2022-12-29 PROCEDURE — 97110 THERAPEUTIC EXERCISES: CPT | Performed by: PHYSICAL THERAPIST

## 2022-12-29 PROCEDURE — 97140 MANUAL THERAPY 1/> REGIONS: CPT | Performed by: PHYSICAL THERAPIST

## 2022-12-29 PROCEDURE — 97112 NEUROMUSCULAR REEDUCATION: CPT | Performed by: PHYSICAL THERAPIST

## 2022-12-29 NOTE — FLOWSHEET NOTE
[facility], [address]  Phone: [phone]  Fax [phone]  :  Physical Therapy Treatment Note/ Progress Report:           Date:  2022    Patient Name:  Franky Kovacs    :  1955  MRN: [id number]    Referring Physician: Prabhu Stewart MD/Pedro Bueno                                                    Evaluation Date: 2022                                         Date of Referral:22     Insurance: [facility] Medicare BMN no auth     Next MD appt: scheduled:  1/3/23     Medical Diagnosis:  D88.488X (ICD-10-CM) - Traumatic complete tear of right rotator cuff, initial encounter     Treatment Diagnosis:  R  massive RCR and bicep tenodesis DOSx 10/26/22  R shoulder pain M25.511  R shoulder stiffness M215.611        Precautions/ Contra-indications: see below  Post operative per protocol:  Patient will wear an abduction pillow brace/sling for 6 weeks postop and be NWB for 6 weeks      Unless otherwise specified, do not begin PT until patient has seen M.D. for 2 weeks postop visit       Has the plan of care been signed (Y/N):        []  Yes  [x]  No    Progress report will be due (10 Rx or 30 days ):   DUE visit 10 or 22 DONE visit 8 22  DUE visit 18 34      Recertification will be due (POC Duration  / 90 days ): 23     Is this a Progress Report:     []  Yes  [x]  No      If Yes:  Date Range for reporting period:  Beginnin22  Endin22              Visit # Date Range Insurance Allowable Requires auth   IN PERSON 9 22- BMN    [x]no        []yes:   TELEHEALTH       TOTAL              CX/NS       Next PT appt:                 PT Practice Pattern:D  Co morbidities:1-2  Surgical:R RCR and bicep tenodesis massive repair  DOS 10/26/22    INITIAL VISIT 22 CURRENT VISIT  22    PAIN 0-10/10 2/10 0-5   FUNCTIONAL SCALE FOTO 40/100 59/100   PROM SFLX 30 140    SER  0 at 0 deg abd 60 deg at 45-50 deg abd    Shoulder Abduction     AROM Shoulder flexion  90    Shoulder abd  90    Shoulder ER           STRENGHT (MMT)   DNT                                           Latex Allergy/Pacemaker/Adhesive allergy:  [x]NO      []YES     RESTRICTIONS/PRECAUTIONS:   Patient will wear an abduction pillow brace/sling for 6 weeks postop and be NWB for 6 weeks     Unless otherwise specified, do not begin PT until patient has seen M.D. for 2 weeks postop visit     Phase I:     Weeks 0-4      May remove sling for gentle pendulum exercises 2-3 times per day      Elbows/hand gripping and range of motion exercises: Perform 4-6 times per day      Cryotherapy as needed      Rhythmic stabilization drills      Continue all isometric contractions and use of cryotherapy as needed      Initiate seated scapular isometrics      Screen posture      May begin joint mobilizations grade 1 and 2 for pain relief/relaxation          Weeks 4-5 (begin 11/28/22)     PROM/AAROM-- flexion to 90°, abduction to 90°, external rotation 30°, internal rotation 30°, extension 30°. (ER/IR in scapular plane, Flexion/Extension at 90° flexion in scapular plane)      ER/IR in scapular plane and at 90° adduction      Initiate strengthening using exercise tubing at 0° abduction (use towel roll under arm)      Initiate manual resistance ER in supine in scapular plane (light resistance)      Progress scapular strengthening      Initiate prone rowing with arm at 30° of adduction to neutral arm position      Initiate prone shoulder extension with the elbow flexed to 90°      Continue use of ice as needed.  May use heat prior to range of motion exercises      Rhythmic stabilization exercises (flexion at 45°, 90°, 100° and ER/IR at multiple angles)          Weeks 5-6 (12/13/22)     PROM/AAROM-flexion to 120°, abduction to 120°, external rotation to degrees, internal rotation 45°, extension 30      Joint mobilizations: Gentle scapular/glenohumeral joint mobilization as indicated to regain the full PROM      AAR in stretching exercises to gain full motion      Shoulder flexion      ER at 90° abduction      Initiate AROM exercises      Shoulder flexion in scapular plane to 90° of flexion      Shoulder abduction to 90°      Progress isotonic strengthening exercise program      IR/ER tubing (towel under arm)      Side lying ER (towel under arm)       Prone rowing at 45° adduction      Prone horizontal abduction (flexed elbow) at 90° adduction      Biceps curls isotonic slipped very light resistance      Slowly progressive strengthening to prevent i inflammation of tendon            SUBJECTIVE:  Patient reports his shoulder pretty good but is achy at times especially in the morning. Pain level:  0-5/10     OBJECTIVE:     Observation/palpation: hypomobile post/inf GH ,mobility    Very tight R>L pec, extremely forward R shoulder and anterior placed humeral head. TPs in R pec minor/major    Pt has significantly poor positioning of B shoulders due to postural issues, with severely displaced R positioning. Exercises/Interventions:     HEP:  [employer] (see scanned forms)  Access Code: [email or web address]  URL: [employer]/  Date: 11/28/2022  Prepared by: Nancy Perkins      Therapeutic Ex (60076)  [facility] re-education (49379) Reps/Sets/time Notes/CUES/Assessment HEP   Cueing for posture X    DC'd due to neck pain/headache X     X 12/5   Cueing to move body not actively move UE X              Prone rows 2x15x3\"  3# MC/VC for form and scap set as opposed to bicep curls    Prone EXT 2x10x3\"  2# fatigued    x                     Manual assist for scap X 12/5               Supine cane press BUE and FLX w towel roll 15x5\"     Supine serratus punch BUE 2#  15x5\" focus ECC     SL scap retraction  10x5\"     SL ER w towel 2x10x3\" Cueing for scap position    SL ABD to 90  SL HAB to 90 10x5\"  10x5\"   Cueing for scap position    Supine flexion AROM to 90 in scap plane 2 x 5 Cueing for scap position            Theratube walkout ER/IR isometrics with towel roll RTB 3 sec   15x Significant VC for scap position                VC/MC    Band pull downs RUE stagger stance R/L posterior BTB 10x3\" ea     ADD w TB and scap depression/retraction GTB 10x3\"                 Manual Intervention (20346)      STM  pec, Oscillations for pain, GH Joint mobilizations Gr II/III, PROM flexion, abduction, ER, IR within restrictions, pec release x15'                       Therapeutic Activity (60606)                  Therapeutic Exercise and NMR EXR  [x] (04151) Provided verbal/tactile cueing for activities related to strengthening, flexibility, endurance, ROM for improvements in LE, proximal hip, and core control with self care, mobility, lifting, ambulation. [x] (36223) Provided verbal/tactile cueing for activities related to improving balance, coordination, kinesthetic sense, posture, motor skill, proprioception  to assist with LE, proximal hip, and core control in self care, mobility, lifting, ambulation and eccentric single leg control.      NMR and Therapeutic Activities:    [x] (89177 or 05788) Provided verbal/tactile cueing for activities related to improving balance, coordination, kinesthetic sense, posture, motor skill, proprioception and motor activation to allow for proper function of core, proximal hip and LE with self care and ADLs  [] (05460) Gait Re-education- Provided training and instruction to the patient for proper LE, core and proximal hip recruitment and positioning and eccentric body weight control with ambulation re-education including up and down stairs     Home Exercise Program:    [x] (10074) Reviewed/Progressed HEP activities related to strengthening, flexibility, endurance, ROM of core, proximal hip and LE for functional self-care, mobility, lifting and ambulation/stair navigation   [] (23410)Reviewed/Progressed HEP activities related to improving balance, coordination, kinesthetic sense, posture, motor skill, proprioception of core, proximal hip and LE for self care, mobility, lifting, and ambulation/stair navigation      Manual Treatments:  PROM / STM / Oscillations-Mobs:  G-I, II, III, IV (PA's, Inf., Post.)  [x] (72111) Provided manual therapy to mobilize LE, proximal hip and/or LS spine soft tissue/joints for the purpose of modulating pain, promoting relaxation,  increasing ROM, reducing/eliminating soft tissue swelling/inflammation/restriction, improving soft tissue extensibility and allowing for proper ROM for normal function with self care, mobility, lifting and ambulation. Trigger point Dry Needling:  fine needle insertion into myofascial trigger points to stimulate a healing response  [] (35412) Needle insertion without injection: 1 or 2 muscles  [] (95678) Needle insertion without injection: 3 or more muscles    Modalities:   [] GAME READY (VASO)- for significant edema, swelling, pain control. [] ESU (HV/PM) for edema and pain control      Charges:  Timed Code Treatment Minutes: 48'   Total Treatment Minutes: 48'       [] EVAL (LOW) 70154 (typically 20 minutes face-to-face)  [] EVAL (MOD) 39856 (typically 30 minutes face-to-face)  [] EVAL (HIGH) 05708 (typically 45 minutes face-to-face)  [] RE-EVAL     [x] DO(34393) x 2  [] IONTO  [x] NMR (59549) x  1   [] VASO  [x] Manual (61967) x 1     [] Other:  [] TA x      [] Mech Traction (45196)  [] ES(attended) (66590)      [] ES (un) (61084):             GOALS:    Patient stated goal: to get mobility back  [x] Progressing: [] Met: [] Not Met: [] Adjusted     Therapist goals for Patient:   Short Term Goals: To be achieved in: 2 weeks    2/2 MET  1. Independent in HEP and progression per patient tolerance, in order to prevent re-injury. [] Progressing: [x] Met: [] Not Met: [] Adjusted  2.  Patient will have a decrease in pain to facilitate improvement in movement, function, and ADLs as indicated by Functional Deficits. [] Progressing: [x] Met: [] Not Met: [] Adjusted     Long Term Goals: To be achieved in: 12 weeks        1. FOTO score to be equal or greater to the predicted score (65/100)  to assist with reaching prior level of function. [] Progressing: [] Met: [] Not Met: [] Adjusted      2. Patient will demonstrate increased AROM to equal the opposite side bilaterally to allow for reaching into upper kitchen cabinets as indicated by patients Functional Deficits. [] Progressing: [] Met: [] Not Met: [] Adjusted       3. Patient will demonstrate an increase in strength to 4+/5 to allow for lifting paint buckets less than 5 lbs  as indicated by patients Functional Deficits. [] Progressing: [] Met: [] Not Met: [] Adjusted       4. 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 (Pt specific functional or activity goal)Pt will be able to sleep w/o sleep aids or waking from shoulder pain. [] Progressing: [] Met: [] Not Met: [] Adjusted  6. Pt will demonstrate pain decreased by 50%  (0-1/10) with all ADLS. [] Progressing: [] Met: [] Not Met: [] Adjusted          ASSESSMENT: 12/27/22  Continues to demonstrate poor posture and needs cueing throughout each exercise to keep scapular retraction for improved shoulder mechanics. Demonstrates most tightness into flexion at this time. Patient educated on avoiding heavier activities such as using a   and an overall gradual increase in activity to protect repair. Patient continues to use his arm in ways that he should not at this point in his recovery. 12/29/22 Pt able to complete all requested tasks this visit. Fatigues quickly when forced to utilize scapular mm.  Patient will benefit from continued skilled intervention to increase ROM, flexibility, strength and function. Overall Progression Towards Functional goals/ Treatment Progress Update:  [x] Patient is progressing as expected towards functional goals listed. [] Progression is slowed due to complexities/Impairments listed. [] Progression has been slowed due to co-morbidities. [] Plan just implemented, too soon to assess goals progression <30days   [] Goals require adjustment due to lack of progress  [] Patient is not progressing as expected and requires additional follow up with physician  [] Other    Prognosis for POC: [x] Good [] Fair  [] Poor      Patient requires continued skilled intervention: [x] Yes  [] No    Treatment/Activity Tolerance:  [x] Patient able to complete treatment  [] Patient limited by fatigue  [] Patient limited by pain    [] Patient limited by other medical complications  [] Other:     PLAN:     [x] Continue per plan of care [] Alter current plan (see comments above)  [] Plan of care initiated [] Hold pending MD visit [] Discharge      Electronically signed by:  Bunny Mitchell, PT, [id number]     Note: If patient does not return for scheduled/ recommended follow up visits, this note will serve as a discharge from care along with most recent update on progress.

## 2023-01-03 ENCOUNTER — HOSPITAL ENCOUNTER (OUTPATIENT)
Dept: PHYSICAL THERAPY | Age: 68
Setting detail: THERAPIES SERIES
Discharge: HOME | End: 2023-01-03
Payer: MEDICARE

## 2023-01-03 ENCOUNTER — OFFICE VISIT (OUTPATIENT)
Dept: ORTHOPEDIC SURGERY | Age: 68
End: 2023-01-03

## 2023-01-03 VITALS — BODY MASS INDEX: 23.85 KG/M2 | HEIGHT: 69 IN | WEIGHT: 161 LBS

## 2023-01-03 DIAGNOSIS — Z47.89 ORTHOPEDIC AFTERCARE: Primary | ICD-10-CM

## 2023-01-03 PROCEDURE — 99024 POSTOP FOLLOW-UP VISIT: CPT | Performed by: ORTHOPAEDIC SURGERY

## 2023-01-03 PROCEDURE — 97140 MANUAL THERAPY 1/> REGIONS: CPT | Performed by: PHYSICAL THERAPIST

## 2023-01-03 PROCEDURE — 97110 THERAPEUTIC EXERCISES: CPT | Performed by: PHYSICAL THERAPIST

## 2023-01-03 NOTE — FLOWSHEET NOTE
[facility], [address]  Phone: [phone]  Fax [phone]  :  Physical Therapy Treatment Note/ Progress Report:           Date:  1/3/2023    Patient Name:  Eri Santos    :  1955  MRN: [id number]    Referring Physician: Anju Argueta MD/Pedro Whitney                                                    Evaluation Date: 2022                                         Date of Referral:22     Insurance: [facility] Medicare BMN no Severiano Grimm MD appt: scheduled:  1/3/23     Medical Diagnosis:  Q64.906K (ICD-10-CM) - Traumatic complete tear of right rotator cuff, initial encounter     Treatment Diagnosis:  R  massive RCR and bicep tenodesis DOSx 10/26/22  R shoulder pain M25.511  R shoulder stiffness M215.611        Precautions/ Contra-indications: see below  Post operative per protocol:  Patient will wear an abduction pillow brace/sling for 6 weeks postop and be NWB for 6 weeks      Unless otherwise specified, do not begin PT until patient has seen M.D. for 2 weeks postop visit       Has the plan of care been signed (Y/N):        []  Yes  [x]  No    Progress report will be due (10 Rx or 30 days ):   DUE visit 10 or 22 DONE visit 8 22  DUE visit 18       Recertification will be due (POC Duration  / 90 days ): 23     Is this a Progress Report:     []  Yes  [x]  No      If Yes:  Date Range for reporting period:  Beginnin23-  Ending:               Visit # Date Range Insurance Allowable Requires auth   IN PERSON  9 22-22 BMN    [x]no        []yes:   2023 1 1/3/23-     TOTAL              CX/NS       Next PT appt: 23                PT Practice Pattern:D  Co morbidities:1-2  Surgical:R RCR and bicep tenodesis massive repair  DOS 10/26/22    INITIAL VISIT 22 CURRENT VISIT  1/3/23   PAIN 0-10/10 2/10 110   FUNCTIONAL SCALE FOTO 40/100 59/100 PROM SFLX 30 155    SER  0 at 0 deg abd 60 deg at 45-50 deg abd    Shoulder IR:      Shoulder Abduction NT NT   AROM Shoulder flexion NT 90    Shoulder abd NT 90    Shoulder ER NT          STRENGHT (MMT)   DNT                                           Latex Allergy/Pacemaker/Adhesive allergy:  [x]NO      []YES     RESTRICTIONS/PRECAUTIONS:   Patient will wear an abduction pillow brace/sling for 6 weeks postop and be NWB for 6 weeks     Unless otherwise specified, do not begin PT until patient has seen M.D. for 2 weeks postop visit     Phase I:     Weeks 0-4      May remove sling for gentle pendulum exercises 2-3 times per day      Elbows/hand gripping and range of motion exercises: Perform 4-6 times per day      Cryotherapy as needed      Rhythmic stabilization drills      Continue all isometric contractions and use of cryotherapy as needed      Initiate seated scapular isometrics      Screen posture      May begin joint mobilizations grade 1 and 2 for pain relief/relaxation          Weeks 4-5 (begin 11/28/22)     PROM/AAROM-- flexion to 90°, abduction to 90°, external rotation 30°, internal rotation 30°, extension 30°. (ER/IR in scapular plane, Flexion/Extension at 90° flexion in scapular plane)      ER/IR in scapular plane and at 90° adduction      Initiate strengthening using exercise tubing at 0° abduction (use towel roll under arm)      Initiate manual resistance ER in supine in scapular plane (light resistance)      Progress scapular strengthening      Initiate prone rowing with arm at 30° of adduction to neutral arm position      Initiate prone shoulder extension with the elbow flexed to 90°      Continue use of ice as needed.  May use heat prior to range of motion exercises      Rhythmic stabilization exercises (flexion at 45°, 90°, 100° and ER/IR at multiple angles)          Weeks 5-6 (12/13/22)     PROM/AAROM-flexion to 120°, abduction to 120°, external rotation to degrees, internal rotation 45°, extension 30      Joint mobilizations: Gentle scapular/glenohumeral joint mobilization as indicated to regain the full PROM      AAR in stretching exercises to gain full motion      Shoulder flexion      ER at 90° abduction      Initiate AROM exercises      Shoulder flexion in scapular plane to 90° of flexion      Shoulder abduction to 90°      Progress isotonic strengthening exercise program      IR/ER tubing (towel under arm)      Side lying ER (towel under arm)       Prone rowing at 45° adduction      Prone horizontal abduction (flexed elbow) at 90° adduction      Biceps curls isotonic slipped very light resistance      Slowly progressive strengthening to prevent i inflammation of tendon            SUBJECTIVE:  Patient reports his shoulder pretty good but is achy at times especially in the morning. Pt reports he is doing as much as he can without using his right arm. Pain level:  1/10    OBJECTIVE:     Observation/palpation: hypomobile post/inf GH ,mobility    Very tight R>L pec, extremely forward R shoulder and anterior placed humeral head. TPs in R pec minor/major    Pt has significantly poor positioning of B shoulders due to postural issues, with severely displaced R positioning. Exercises/Interventions:     HEP:  [employer] (see scanned forms)  Access Code: [email or web address]  URL: ADINCON/  Date: 11/28/2022  Prepared by: Deyanira Lee      Therapeutic Ex (93003)  NMR re-education (49653) Reps/Sets/time Notes/CUES/Assessment HEP   Cueing for posture X    DC'd due to neck pain/headache X     X 12/5   Cueing to move body not actively move UE X              MC/VC for form and scap set as opposed to bicep curls    fatigued    x                     Manual assist for scap X 12/5   Cane FLX SCAP/FLX 6x10\" ea     Pulleys FLX 5'  10\"H                 Cueing for scap position    Cueing for scap position    Cueing for scap position            Significant VC for scap position                VC/MC Pt education x5' progression    Manual Intervention ([id number])      STM  pec, Oscillations for pain, GH Joint mobilizations Gr II/III, PROM flexion, abduction, ER, IR within restrictions, pec release x25'     reassessment 5'                 Therapeutic Activity (96420)                  Therapeutic Exercise and NMR EXR  [x] (27675) Provided verbal/tactile cueing for activities related to strengthening, flexibility, endurance, ROM for improvements in LE, proximal hip, and core control with self care, mobility, lifting, ambulation. [x] (24228) Provided verbal/tactile cueing for activities related to improving balance, coordination, kinesthetic sense, posture, motor skill, proprioception  to assist with LE, proximal hip, and core control in self care, mobility, lifting, ambulation and eccentric single leg control.      NMR and Therapeutic Activities:    [x] (54785 or 23541) Provided verbal/tactile cueing for activities related to improving balance, coordination, kinesthetic sense, posture, motor skill, proprioception and motor activation to allow for proper function of core, proximal hip and LE with self care and ADLs  [] (97457) Gait Re-education- Provided training and instruction to the patient for proper LE, core and proximal hip recruitment and positioning and eccentric body weight control with ambulation re-education including up and down stairs     Home Exercise Program:    [x] (30438) Reviewed/Progressed HEP activities related to strengthening, flexibility, endurance, ROM of core, proximal hip and LE for functional self-care, mobility, lifting and ambulation/stair navigation   [] (64752)Reviewed/Progressed HEP activities related to improving balance, coordination, kinesthetic sense, posture, motor skill, proprioception of core, proximal hip and LE for self care, mobility, lifting, and ambulation/stair navigation      Manual Treatments:  PROM / STM / Oscillations-Mobs:  G-I, II, III, IV (PA's, Inf., Post.)  [x] (03276) Provided manual therapy to mobilize LE, proximal hip and/or LS spine soft tissue/joints for the purpose of modulating pain, promoting relaxation,  increasing ROM, reducing/eliminating soft tissue swelling/inflammation/restriction, improving soft tissue extensibility and allowing for proper ROM for normal function with self care, mobility, lifting and ambulation. Trigger point Dry Needling:  fine needle insertion into myofascial trigger points to stimulate a healing response  [] (52569) Needle insertion without injection: 1 or 2 muscles  [] (08831) Needle insertion without injection: 3 or more muscles    Modalities:   [] GAME READY (VASO)- for significant edema, swelling, pain control. [] ESU (HV/PM) for edema and pain control      Charges:  Timed Code Treatment Minutes: Total Treatment Minutes:        [] EVAL (LOW) 14662 (typically 20 minutes face-to-face)  [] EVAL (MOD) 21196 (typically 30 minutes face-to-face)  [] EVAL (HIGH) 48004 (typically 45 minutes face-to-face)  [] RE-EVAL     [x] IF(56865) x 1  [] IONTO  [] NMR (26580) x    [] VASO  [x] Manual (55376) x 2     [] Other:  [] TA x      [] Mech Traction (67287)  [] ES(attended) (41064)      [] ES (un) (78494):             GOALS:    Patient stated goal: to get mobility back  [x] Progressing: [] Met: [] Not Met: [] Adjusted     Therapist goals for Patient:   Short Term Goals: To be achieved in: 2 weeks    2/2 MET  1. Independent in HEP and progression per patient tolerance, in order to prevent re-injury. [] Progressing: [x] Met: [] Not Met: [] Adjusted  2. Patient will have a decrease in pain to facilitate improvement in movement, function, and ADLs as indicated by Functional Deficits. [] Progressing: [x] Met: [] Not Met: [] Adjusted     Long Term Goals: To be achieved in: 12 weeks        1. FOTO score to be equal or greater to the predicted score (65/100)  to assist with reaching prior level of function.    [] Progressing: [] Met: [] Not Met: [] Adjusted      2. Patient will demonstrate increased AROM to equal the opposite side bilaterally to allow for reaching into upper kitchen cabinets as indicated by patients Functional Deficits. [] Progressing: [] Met: [] Not Met: [] Adjusted       3. Patient will demonstrate an increase in strength to 4+/5 to allow for lifting paint buckets less than 5 lbs  as indicated by patients Functional Deficits. [] Progressing: [] Met: [] Not Met: [] Adjusted       4. 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 (Pt specific functional or activity goal)Pt will be able to sleep w/o sleep aids or waking from shoulder pain. [] Progressing: [] Met: [] Not Met: [] Adjusted  6. Pt will demonstrate pain decreased by 50%  (0-1/10) with all ADLS. [] Progressing: [] Met: [] Not Met: [] Adjusted          ASSESSMENT: 12/27/22  Continues to demonstrate poor posture and needs cueing throughout each exercise to keep scapular retraction for improved shoulder mechanics. Demonstrates most tightness into flexion at this time. Patient educated on avoiding heavier activities such as using a   and an overall gradual increase in activity to protect repair. Patient continues to use his arm in ways that he should not at this point in his recovery. 12/29/22 Pt able to complete all requested tasks this visit. Fatigues quickly when forced to utilize scapular mm. Patient will benefit from continued skilled intervention to increase ROM, flexibility, strength and function. 1/3/23 Pt cont to push limits of what he is doing at home without using his RUE. He cont to have difficulty w PROM R FLX due to tight post capsule nd cuff. His standard posture is this as well on his unaffected UE.     Patient will benefit from continued skilled intervention to increase ROM, flexibility, strength and function. Overall Progression Towards Functional goals/ Treatment Progress Update:  [x] Patient is progressing as expected towards functional goals listed. [] Progression is slowed due to complexities/Impairments listed. [] Progression has been slowed due to co-morbidities. [] Plan just implemented, too soon to assess goals progression <30days   [] Goals require adjustment due to lack of progress  [] Patient is not progressing as expected and requires additional follow up with physician  [] Other    Prognosis for POC: [x] Good [] Fair  [] Poor      Patient requires continued skilled intervention: [x] Yes  [] No    Treatment/Activity Tolerance:  [x] Patient able to complete treatment  [] Patient limited by fatigue  [] Patient limited by pain    [] Patient limited by other medical complications  [] Other:     PLAN: Cont per protocol. [x] Continue per plan of care [] Alter current plan (see comments above)  [] Plan of care initiated [] Hold pending MD visit [] Discharge      Electronically signed by:  Alicia Lerma, PT, [id number]     Note: If patient does not return for scheduled/ recommended follow up visits, this note will serve as a discharge from care along with most recent update on progress.

## 2023-01-03 NOTE — PROGRESS NOTES
POST OPERATIVE ORTHOPAEDIC NOTE    DOS: 10/26/2022  PROCEDURES: Revision right shoulder arthroscopic massive rotator cuff repair to include supraspinatus and subscapularis, subacromial decompression and open subpectoral biceps tenodesis    The patient has been recovering. The patient states the pain is 1/10 pain. The patient has started PT. patient has been out of his sling for the last 2 to 4 weeks roughly. He is pleased with the results thus far. Has been working with physical therapy diligently. He is gone to 15 visits thus far. Focused pertinent physical examination of the operative extremity:  Wounds C/D/I, well healed surgical incisions  Scar tissue over anterior portal as well as the biceps tenodesis site  5/5 supraspinatus infraspinatus and subscapularis brief testing  150 degrees forward flexion, 40 degrees external rotation, good cosmesis on biceps firing  Skin intact throughout  5/5 D B T G IO EPL  SILT Ax, R, U, M  +2 radial pulse     Diagnosis Orders   1. Orthopedic aftercare          Assessment and plan: The patient is now 2.25 months status post the above listed procedure and is recovering    . --Greater than 50% of the time (11/20 minutes) was spent coordinating care and discussing postoperative recovery course  -I had a pleasant discussion with the patient today. I reviewed with him intraoperative procedures performed and arthroscopic photos. He is doing quite well. I did review with him scar tissue massage as he does have some scar tissue on the anterior portal and also the biceps tenodesis site  -Tenodesis is well-appearing.  -I reviewed the patient that currently he will continue to follow the massive rotator cuff repair protocol. I spoke with his physical therapist today.   She feels he is continuing to make excellent strides as well with overall recovery and reconditioning which is encouraging especially in the revision setting.  -Continue activity modification and I reviewed with him still restrictions in general speaking terms as he continues to recover  -OTC Tylenol Aleve per bottle as needed discomfort  -All questions answered to the patient's satisfaction and the patient expressed understanding and agreement with the above listed treatment plan  -Follow up in 3.5 months for a 6-month routine postop visit. -Thank you for the clinical consultation and allowing me to participate in the patient's care. Electronically signed by Heavenly Kebede MD on 1/3/23 at 3:19 PM BANDAR Kebede MD       Orthopaedic Surgery-Sports Medicine      Disclaimer: This note was dictated with voice recognition software. Though review and correction are routinely performed, please contact the office/medical records for any errors requiring correction.

## 2023-01-05 ENCOUNTER — HOSPITAL ENCOUNTER (OUTPATIENT)
Dept: PHYSICAL THERAPY | Age: 68
Setting detail: THERAPIES SERIES
Discharge: HOME | End: 2023-01-05
Payer: MEDICARE

## 2023-01-05 PROCEDURE — 97112 NEUROMUSCULAR REEDUCATION: CPT | Performed by: PHYSICAL THERAPIST

## 2023-01-05 PROCEDURE — 97110 THERAPEUTIC EXERCISES: CPT | Performed by: PHYSICAL THERAPIST

## 2023-01-05 PROCEDURE — 97140 MANUAL THERAPY 1/> REGIONS: CPT | Performed by: PHYSICAL THERAPIST

## 2023-01-05 NOTE — FLOWSHEET NOTE
[facility], [address]  Phone: [phone]  Fax [phone]  :  Physical Therapy Treatment Note/ Progress Report:           Date:  2023    Patient Name:  Michelle Holt    :  1955  MRN: [id number]    Referring Physician: Anita Jama MD/Pedro Tavera                                                    Evaluation Date: 2022                                         Date of Referral:22     Insurance: [facility] Medicare BMN no auth     Next MD appt: scheduled:  1/3/23     Medical Diagnosis:  V39.972E (ICD-10-CM) - Traumatic complete tear of right rotator cuff, initial encounter     Treatment Diagnosis:  R  massive RCR and bicep tenodesis DOSx 10/26/22  R shoulder pain M25.511  R shoulder stiffness M215.611        Precautions/ Contra-indications: see below        Has the plan of care been signed (Y/N):        []  Yes  [x]  No    Progress report will be due (10 Rx or 30 days ):   DUE visit 10 or 22 DONE visit 8 22  DUE visit 18       Recertification will be due (POC Duration  / 90 days ): 23     Is this a Progress Report:     []  Yes  [x]  No      If Yes:  Date Range for reporting period:  Beginnin23-  Ending:               Visit # Date Range Insurance Allowable Requires auth   IN PERSON  9 22-22 BMN    [x]no        []yes:   2023 2 1/3/23-     TOTAL              CX/NS       Next PT appt: 1/10/23                PT Practice Pattern:D  Co morbidities:1-2  Surgical:R RCR and bicep tenodesis massive repair  DOS 10/26/22    INITIAL VISIT 22 CURRENT VISIT  1/3/23   PAIN 0-10/10 2/10 1/10   FUNCTIONAL SCALE FOTO 40/100 59/100   PROM SFLX 30 155    SER  0 at 0 deg abd 60 deg at 45-50 deg abd    Shoulder IR:      Shoulder Abduction NT NT   AROM Shoulder flexion NT 90    Shoulder abd NT 90    Shoulder ER NT          STRENGHT (MMT)   DNT Latex Allergy/Pacemaker/Adhesive allergy:  [x]NO      []YES     RESTRICTIONS/PRECAUTIONS:    Slowly progressive strengthening to prevent i inflammation of tendon     Week 7      Discontinue sling     Maintain full range of motion in all planes     Advance PROM/AAROM in all directions as tolerated     Maintain full range of motion in all planes      Continue dynamic stabilization drills      Progressed AROM and light strengthening program with the addition of      ER/IR tubing      Lateral raises to 90° of abduction      Full can in scapular plane to 90° of elevation      Prone extension      Prone soleus punch      Elbow flexion and extension      *Must be able to elevate arm without shoulder were scapular hiking before initiating isotonic, if unable, continue dynamic rhythm stabilization glenohumeral joint exercises      Progress joint mobilizations to grades 3 and 4 to address capsular restrictions as indicated for all shoulder girdle joints          Weeks 8-9 (1/5/23)     Continue as above      Initiate light functional activities if the physician permits      In pain-free ROM, starting at waist level activities, progression to shoulder level activities, and overhead activities          Week 10      Continue with all exercises listed above      Progress the fundamental shoulder exercises      Strengthening exercises:  Addition of the following      Standing lateral raise to 90°      Prone horizontal abduction-Ts'      Prone scaption - Ys      Initiate isotonic resistance (0.5kg weight) during flexion and abduction the patient exhibits nonpainful normal motion without substitution patterns          Weeks 11-14      Progress all exercises      Continue ROM and flexibility exercises      Stretch posterior capsule with cross body adduction stretching      Progress strengthening program (increase 0.5kg/10days if nonpainful)      No residual pain should be present following exercises Criteria to advance to Phase III      Full AROM and PROM      Pain-free with all strengthening exercises      Dynamic shoulder stability        SUBJECTIVE:  Patient reports his shoulder is feeling a little looser than it was. Reports that doc was encouraged by his progress and he does not have to see him for 6 weeks. Pain level:  1/10    OBJECTIVE:     Observation/palpation: hypomobile post/inf GH ,mobility      Pt has significantly poor positioning of B shoulders due to postural issues, with severely displaced R positioning. Exercises/Interventions:     HEP:  [employer] (see scanned forms)  Access Code: [email or web address]  URL: [employer]/  Date: 11/28/2022  Prepared by: Jaime Unger      SEE PREVIOUS VISIT NOTES FOR HEP    Therapeutic Ex (69668)  NMR re-education (92123) Reps/Sets/time Notes/CUES/Assessment HEP   Submax isos (50%) MR  IR/ER @ 0/60/85  90/90 4 way   6x5\" ea  7x5\"     Supine BUE protraction  2#  2x12x3\"     Supine short lever FLX R 1#  15x Requires VC/MC to scap set    SLER w scap set 1#  2x10x3\" Improved scap set    SLABD to 90 10x5\" VC    Prone ext 2#  2x15x3\"     Prone retraction 3#  2x10x3\"     Wall wipes HorAB, SCAP,FLX BUE 10x ea     In front of mirror BUE  FLX to 90  SAP to 90 0#  10x  10x MC    SL scap set 15x5\" MC                                                                                                                                        Pt education x5' progression    Manual Intervention (76948)      TPR/STM post cuff and lats  GII-III inf/post GH mobs  PROM ER/IR/FLX x11'                       Therapeutic Activity (26234)                  Therapeutic Exercise and NMR EXR  [x] (31188) Provided verbal/tactile cueing for activities related to strengthening, flexibility, endurance, ROM for improvements in LE, proximal hip, and core control with self care, mobility, lifting, ambulation.   [x] (19290) Provided verbal/tactile cueing for activities related to improving balance, coordination, kinesthetic sense, posture, motor skill, proprioception  to assist with LE, proximal hip, and core control in self care, mobility, lifting, ambulation and eccentric single leg control. NMR and Therapeutic Activities:    [x] (67235 or 49989) Provided verbal/tactile cueing for activities related to improving balance, coordination, kinesthetic sense, posture, motor skill, proprioception and motor activation to allow for proper function of core, proximal hip and LE with self care and ADLs  [] (25636) Gait Re-education- Provided training and instruction to the patient for proper LE, core and proximal hip recruitment and positioning and eccentric body weight control with ambulation re-education including up and down stairs     Home Exercise Program:    [x] (74653) Reviewed/Progressed HEP activities related to strengthening, flexibility, endurance, ROM of core, proximal hip and LE for functional self-care, mobility, lifting and ambulation/stair navigation   [] (24152)Reviewed/Progressed HEP activities related to improving balance, coordination, kinesthetic sense, posture, motor skill, proprioception of core, proximal hip and LE for self care, mobility, lifting, and ambulation/stair navigation      Manual Treatments:  PROM / STM / Oscillations-Mobs:  G-I, II, III, IV (PA's, Inf., Post.)  [x] (80577) Provided manual therapy to mobilize LE, proximal hip and/or LS spine soft tissue/joints for the purpose of modulating pain, promoting relaxation,  increasing ROM, reducing/eliminating soft tissue swelling/inflammation/restriction, improving soft tissue extensibility and allowing for proper ROM for normal function with self care, mobility, lifting and ambulation.      Trigger point Dry Needling:  fine needle insertion into myofascial trigger points to stimulate a healing response  [] (17485) Needle insertion without injection: 1 or 2 muscles  [] (01560) Needle insertion without injection: 3 or more muscles    Modalities:  declined   [] GAME READY (VASO)- for significant edema, swelling, pain control. [] ESU (HV/PM) for edema and pain control      Charges: 11:00-11:55  Timed Code Treatment Minutes: 50   Total Treatment Minutes: 55       [] EVAL (LOW) 58064 (typically 20 minutes face-to-face)  [] EVAL (MOD) 48062 (typically 30 minutes face-to-face)  [] EVAL (HIGH) 30167 (typically 45 minutes face-to-face)  [] RE-EVAL     [x] SD(94852) x 2  [] IONTO  [x] NMR (51528) x  1  [] VASO  [x] Manual (05742) x 1     [] Other:  [] TA x      [] Mech Traction (10901)  [] ES(attended) (30989)      [] ES (un) (93800):             GOALS:    Patient stated goal: to get mobility back  [x] Progressing: [] Met: [] Not Met: [] Adjusted     Therapist goals for Patient:   Short Term Goals: To be achieved in: 2 weeks    2/2 MET    1. Independent in HEP and progression per patient tolerance, in order to prevent re-injury. [] Progressing: [x] Met: [] Not Met: [] Adjusted  2. Patient will have a decrease in pain to facilitate improvement in movement, function, and ADLs as indicated by Functional Deficits. [] Progressing: [x] Met: [] Not Met: [] Adjusted     Long Term Goals: To be achieved in: 12 weeks        1. FOTO score to be equal or greater to the predicted score (65/100)  to assist with reaching prior level of function. [] Progressing: [] Met: [] Not Met: [] Adjusted      2. Patient will demonstrate increased AROM to equal the opposite side bilaterally to allow for reaching into upper kitchen cabinets as indicated by patients Functional Deficits. [] Progressing: [] Met: [] Not Met: [] Adjusted       3. Patient will demonstrate an increase in strength to 4+/5 to allow for lifting paint buckets less than 5 lbs  as indicated by patients Functional Deficits. [] Progressing: [] Met: [] Not Met: [] Adjusted       4.  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 (Pt specific functional or activity goal)Pt will be able to sleep w/o sleep aids or waking from shoulder pain. [] Progressing: [] Met: [] Not Met: [] Adjusted  6. Pt will demonstrate pain decreased by 50%  (0-1/10) with all ADLS. [] Progressing: [] Met: [] Not Met: [] Adjusted          ASSESSMENT:     Pt has significant B post cuff and GH tightness which limits hi IR and FLX on B shoulders. Therefore he is not expected to gain full textbook ROM but is expected to regain his normal pre op ROM. He is having issues w his L shoulder at this time and would benefit from mobs and ROM as well    Patient will benefit from continued skilled intervention to increase ROM, flexibility, strength and function. Overall Progression Towards Functional goals/ Treatment Progress Update:  [x] Patient is progressing as expected towards functional goals listed. [] Progression is slowed due to complexities/Impairments listed. [] Progression has been slowed due to co-morbidities. [] Plan just implemented, too soon to assess goals progression <30days   [] Goals require adjustment due to lack of progress  [] Patient is not progressing as expected and requires additional follow up with physician  [] Other    Prognosis for POC: [x] Good [] Fair  [] Poor      Patient requires continued skilled intervention: [x] Yes  [] No    Treatment/Activity Tolerance:  [x] Patient able to complete treatment  [] Patient limited by fatigue  [] Patient limited by pain    [] Patient limited by other medical complications  [] Other:     PLAN: Cont per protocol. Add therex slowly.     [x] Continue per plan of care [] Alter current plan (see comments above)  [] Plan of care initiated [] Hold pending MD visit [] Discharge      Electronically signed by:  Saida Givens, PT, [id number]     Note: If patient does not return for scheduled/ recommended follow up visits, this note will serve as a discharge from care along with most recent update on progress.

## 2023-01-10 ENCOUNTER — HOSPITAL ENCOUNTER (OUTPATIENT)
Dept: PHYSICAL THERAPY | Age: 68
Setting detail: THERAPIES SERIES
Discharge: HOME | End: 2023-01-10
Payer: MEDICARE

## 2023-01-10 PROCEDURE — 97112 NEUROMUSCULAR REEDUCATION: CPT | Performed by: PHYSICAL THERAPIST

## 2023-01-10 PROCEDURE — 97110 THERAPEUTIC EXERCISES: CPT | Performed by: PHYSICAL THERAPIST

## 2023-01-10 PROCEDURE — 97140 MANUAL THERAPY 1/> REGIONS: CPT | Performed by: PHYSICAL THERAPIST

## 2023-01-10 NOTE — FLOWSHEET NOTE
[facility], [address]  Phone: [phone]  Fax [phone]  :  Physical Therapy Treatment Note/ Progress Report:           Date:  1/10/2023    Patient Name:  Tima Puckett    :  1955  MRN: [id number]    Referring Physician: Benoit Hardy MD/Pedro Montez [facility]                                                    Evaluation Date: 2022                                         Date of Referral:22     Insurance: [facility] Medicare BMN no auth     Next MD appt: scheduled:  1/3/23     Medical Diagnosis:  S46.011A (ICD-10-CM) - Traumatic complete tear of right rotator cuff, initial encounter     Treatment Diagnosis:  R  massive RCR and bicep tenodesis DOSx 10/26/22  R shoulder pain M25.511  R shoulder stiffness M215.611        Precautions/ Contra-indications: see below        Has the plan of care been signed (Y/N):        []  Yes  [x]  No    Progress report will be due (10 Rx or 30 days ):   DUE visit 10 or 22 DONE visit 8 22  DUE visit 18 3/94/23      Recertification will be due (POC Duration  / 90 days ): 23     Is this a Progress Report:     []  Yes  [x]  No      If Yes:  Date Range for reporting period:  Beginnin23-  Ending:               Visit # Date Range Insurance Allowable Requires auth   IN PERSON  9 22-22 BMN    [x]no        []yes:   2023 3 1/3/23-     TOTAL              CX/NS       Next PT appt: 23                PT Practice Pattern:D  Co morbidities:1-2  Surgical:R RCR and bicep tenodesis massive repair  DOS 10/26/22    INITIAL VISIT 22 CURRENT VISIT  1/3/23   PAIN 0-10/10 2/10 1/10   FUNCTIONAL SCALE FOTO 40/100 59/100   PROM SFLX 30 155    SER  0 at 0 deg abd 60 deg at 45-50 deg abd    Shoulder IR:      Shoulder Abduction NT NT   AROM Shoulder flexion NT 90    Shoulder abd NT 90    Shoulder ER NT          STRENGHT (MMT)   DNT Latex Allergy/Pacemaker/Adhesive allergy:  [x]NO      []YES     RESTRICTIONS/PRECAUTIONS:    Slowly progressive strengthening to prevent i inflammation of tendon     Week 7      Discontinue sling     Maintain full range of motion in all planes     Advance PROM/AAROM in all directions as tolerated     Maintain full range of motion in all planes      Continue dynamic stabilization drills      Progressed AROM and light strengthening program with the addition of      ER/IR tubing      Lateral raises to 90° of abduction      Full can in scapular plane to 90° of elevation      Prone extension      Prone soleus punch      Elbow flexion and extension      *Must be able to elevate arm without shoulder were scapular hiking before initiating isotonic, if unable, continue dynamic rhythm stabilization glenohumeral joint exercises      Progress joint mobilizations to grades 3 and 4 to address capsular restrictions as indicated for all shoulder girdle joints          Weeks 8-9 (1/5/23)     Continue as above      Initiate light functional activities if the physician permits      In pain-free ROM, starting at waist level activities, progression to shoulder level activities, and overhead activities          Week 10      Continue with all exercises listed above      Progress the fundamental shoulder exercises      Strengthening exercises:  Addition of the following      Standing lateral raise to 90°      Prone horizontal abduction-Ts'      Prone scaption - Ys      Initiate isotonic resistance (0.5kg weight) during flexion and abduction the patient exhibits nonpainful normal motion without substitution patterns          Weeks 11-14      Progress all exercises      Continue ROM and flexibility exercises      Stretch posterior capsule with cross body adduction stretching      Progress strengthening program (increase 0.5kg/10days if nonpainful)      No residual pain should be present following exercises Criteria to advance to Phase III      Full AROM and PROM      Pain-free with all strengthening exercises      Dynamic shoulder stability        SUBJECTIVE:  Patient reports his shoulder is feeling a little looser than it was. A little stiff in the mornings but then loosens up. He reports he has slipped on his exercises. He has been trying tot do things with his shoulder. Pain level:  1/10    OBJECTIVE:     Observation/palpation: hypomobile post/inf GH ,mobility      Pt has significantly poor positioning of B shoulders due to postural issues, with severely displaced R positioning. Exercises/Interventions:     HEP:  [employer] (see scanned forms)  Access Code: [email or web address]  URL: [employer]/  Date: 11/28/2022  Prepared by: Renny Garcia      SEE PREVIOUS VISIT NOTES FOR HEP    Therapeutic Ex (44044)  NMR re-education (04921) Reps/Sets/time Notes/CUES/Assessment HEP   Submax isos (50%) MR  IR/ER @ 0/60/85  90/90 4 way   6x5\" ea  7x5\"     Supine BUE protraction  2#  2x15x3\"     Supine \"crosses\" @ 90/90 3#  10x ea w manual targets     Supine short lever FLX R 1#  2x10x Requires VC/MC to scap set and NOT push past pain    Improved scap set    VC    Prone ext 2#  2x15x3\"     Prone rows w scap set 5#  3x10x3\" Cont'd cues    Prone retraction 3#  3x10x3\"     Wall wipes HorAB, SCAP,FLX BUE 10x ea     In front of mirror BUE  FLX to 90  SAP to 90 0#  10x  10x MC    MC    TB walk outs GTB  15x3\"  Small step Significant cues required for form                                                                                                                                  Pt education x5' progression    Manual Intervention (47910)      TPR/STM post cuff and lats  GII-III inf/post GH mobs  PROM ER/IR/FLX x12'                       Therapeutic Activity (12154)                  Therapeutic Exercise and NMR EXR  [x] (99475) Provided verbal/tactile cueing for activities related to strengthening, flexibility, endurance, ROM for improvements in LE, proximal hip, and core control with self care, mobility, lifting, ambulation. [x] (95006) Provided verbal/tactile cueing for activities related to improving balance, coordination, kinesthetic sense, posture, motor skill, proprioception  to assist with LE, proximal hip, and core control in self care, mobility, lifting, ambulation and eccentric single leg control. NMR and Therapeutic Activities:    [x] (78099 or 17675) Provided verbal/tactile cueing for activities related to improving balance, coordination, kinesthetic sense, posture, motor skill, proprioception and motor activation to allow for proper function of core, proximal hip and LE with self care and ADLs  [] (33011) Gait Re-education- Provided training and instruction to the patient for proper LE, core and proximal hip recruitment and positioning and eccentric body weight control with ambulation re-education including up and down stairs     Home Exercise Program:    [x] (38577) Reviewed/Progressed HEP activities related to strengthening, flexibility, endurance, ROM of core, proximal hip and LE for functional self-care, mobility, lifting and ambulation/stair navigation   [] (62248)Reviewed/Progressed HEP activities related to improving balance, coordination, kinesthetic sense, posture, motor skill, proprioception of core, proximal hip and LE for self care, mobility, lifting, and ambulation/stair navigation      Manual Treatments:  PROM / STM / Oscillations-Mobs:  G-I, II, III, IV (PA's, Inf., Post.)  [x] (91313) Provided manual therapy to mobilize LE, proximal hip and/or LS spine soft tissue/joints for the purpose of modulating pain, promoting relaxation,  increasing ROM, reducing/eliminating soft tissue swelling/inflammation/restriction, improving soft tissue extensibility and allowing for proper ROM for normal function with self care, mobility, lifting and ambulation.      Trigger point Dry Needling:  fine needle insertion into myofascial trigger points to stimulate a healing response  [] (93202) Needle insertion without injection: 1 or 2 muscles  [] (11221) Needle insertion without injection: 3 or more muscles    Modalities:  CP x10' post session   [] GAME READY (VASO)- for significant edema, swelling, pain control. [] ESU (HV/PM) for edema and pain control      Charges:   Timed Code Treatment Minutes: 53   Total Treatment Minutes: 65       [] EVAL (LOW) 49423 (typically 20 minutes face-to-face)  [] EVAL (MOD) 34436 (typically 30 minutes face-to-face)  [] EVAL (HIGH) 44355 (typically 45 minutes face-to-face)  [] RE-EVAL     [x] JH(71391) x 2  [] IONTO  [x] NMR (72437) x  1  [] VASO  [x] Manual (79516) x 1     [] Other:  [] TA x      [] Mech Traction (80954)  [] ES(attended) (37743)      [] ES (un) (76531):             GOALS:    Patient stated goal: to get mobility back  [x] Progressing: [] Met: [] Not Met: [] Adjusted     Therapist goals for Patient:   Short Term Goals: To be achieved in: 2 weeks    2/2 MET    1. Independent in HEP and progression per patient tolerance, in order to prevent re-injury. [] Progressing: [x] Met: [] Not Met: [] Adjusted  2. Patient will have a decrease in pain to facilitate improvement in movement, function, and ADLs as indicated by Functional Deficits. [] Progressing: [x] Met: [] Not Met: [] Adjusted     Long Term Goals: To be achieved in: 12 weeks        1. FOTO score to be equal or greater to the predicted score (65/100)  to assist with reaching prior level of function. [] Progressing: [] Met: [] Not Met: [] Adjusted      2. Patient will demonstrate increased AROM to equal the opposite side bilaterally to allow for reaching into upper kitchen cabinets as indicated by patients Functional Deficits. [] Progressing: [] Met: [] Not Met: [] Adjusted       3.  Patient will demonstrate an increase in strength to 4+/5 to allow for lifting paint buckets less than 5 lbs  as indicated by patients Functional Deficits. [] Progressing: [] Met: [] Not Met: [] Adjusted       4. 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 (Pt specific functional or activity goal)Pt will be able to sleep w/o sleep aids or waking from shoulder pain. [] Progressing: [] Met: [] Not Met: [] Adjusted  6. Pt will demonstrate pain decreased by 50%  (0-1/10) with all ADLS. [] Progressing: [] Met: [] Not Met: [] Adjusted          ASSESSMENT:     Pt demonstrated improved RPM and joint mobility this visit. He cont to use \"doing things at home\" with his arm instead of HEP. Pt was educated on the dangers of this and is to resume HEP not pushing into pain too far. He was educated that \"no pain no gain\" is not appropriate here. Patient will benefit from continued skilled intervention to increase ROM, flexibility, strength and function. Overall Progression Towards Functional goals/ Treatment Progress Update:  [x] Patient is progressing as expected towards functional goals listed. [] Progression is slowed due to complexities/Impairments listed. [] Progression has been slowed due to co-morbidities. [] Plan just implemented, too soon to assess goals progression <30days   [] Goals require adjustment due to lack of progress  [] Patient is not progressing as expected and requires additional follow up with physician  [] Other    Prognosis for POC: [x] Good [] Fair  [] Poor      Patient requires continued skilled intervention: [x] Yes  [] No    Treatment/Activity Tolerance:  [x] Patient able to complete treatment  [] Patient limited by fatigue  [] Patient limited by pain    [] Patient limited by other medical complications  [] Other:     PLAN: Cont per protocol. Add therex slowly.     [x] Continue per plan of care [] Alter current plan (see comments above)  [] Plan of care initiated [] Hold pending MD visit [] Discharge      Electronically signed by:  William Deng, PT, [id number]     Note: If patient does not return for scheduled/ recommended follow up visits, this note will serve as a discharge from care along with most recent update on progress.

## 2023-01-12 ENCOUNTER — HOSPITAL ENCOUNTER (OUTPATIENT)
Dept: PHYSICAL THERAPY | Age: 68
Setting detail: THERAPIES SERIES
Discharge: HOME | End: 2023-01-12
Payer: MEDICARE

## 2023-01-12 PROCEDURE — 97140 MANUAL THERAPY 1/> REGIONS: CPT | Performed by: PHYSICAL THERAPIST

## 2023-01-12 PROCEDURE — 97110 THERAPEUTIC EXERCISES: CPT | Performed by: PHYSICAL THERAPIST

## 2023-01-12 PROCEDURE — 97112 NEUROMUSCULAR REEDUCATION: CPT | Performed by: PHYSICAL THERAPIST

## 2023-01-12 NOTE — FLOWSHEET NOTE
[facility], [address]  Phone: [phone]  Fax [phone]  :  Physical Therapy Treatment Note/ Progress Report:           Date:  2023    Patient Name:  Catalino Rodríguez    :  1955  MRN: [id number]    Referring Physician: Denise Ruby MD/Pedro Agudelo                                                    Evaluation Date: 2022                                         Date of Referral:22     Insurance: [facility] Medicare BMN no auth     Next MD appt: scheduled:  1/3/23     Medical Diagnosis:  S46.011A (ICD-10-CM) - Traumatic complete tear of right rotator cuff, initial encounter     Treatment Diagnosis:  R  massive RCR and bicep tenodesis DOSx 10/26/22  R shoulder pain M25.511  R shoulder stiffness M215.611        Precautions/ Contra-indications: see below        Has the plan of care been signed (Y/N):        []  Yes  [x]  No    Progress report will be due (10 Rx or 30 days ):   DUE visit 10 or 22 DONE visit 8 22  DUE visit 18 67      Recertification will be due (POC Duration  / 90 days ): 23     Is this a Progress Report:     []  Yes  [x]  No      If Yes:  Date Range for reporting period:  Beginnin23-  Ending:               Visit # Date Range Insurance Allowable Requires auth   IN PERSON  9 22-22 BMN    [x]no        []yes:   2023 4 1/3/23-     TOTAL              CX/NS       Next PT appt: 23                PT Practice Pattern:D  Co morbidities:1-2  Surgical:R RCR and bicep tenodesis massive repair  DOS 10/26/22    INITIAL VISIT 22 CURRENT VISIT  1/3/23   PAIN 0-10/10 2/10 1/10   FUNCTIONAL SCALE FOTO 40/100 59/100   PROM SFLX 30 155    SER  0 at 0 deg abd 60 deg at 45-50 deg abd    Shoulder IR:      Shoulder Abduction NT NT   AROM Shoulder flexion NT 90    Shoulder abd NT 90    Shoulder ER NT          STRENGHT (MMT)   DNT Latex Allergy/Pacemaker/Adhesive allergy:  [x]NO      []YES     RESTRICTIONS/PRECAUTIONS:    Slowly progressive strengthening to prevent i inflammation of tendon     Week 7      Discontinue sling     Maintain full range of motion in all planes     Advance PROM/AAROM in all directions as tolerated     Maintain full range of motion in all planes      Continue dynamic stabilization drills      Progressed AROM and light strengthening program with the addition of      ER/IR tubing      Lateral raises to 90° of abduction      Full can in scapular plane to 90° of elevation      Prone extension      Prone soleus punch      Elbow flexion and extension      *Must be able to elevate arm without shoulder were scapular hiking before initiating isotonic, if unable, continue dynamic rhythm stabilization glenohumeral joint exercises      Progress joint mobilizations to grades 3 and 4 to address capsular restrictions as indicated for all shoulder girdle joints          Weeks 8-9 (1/5/23)     Continue as above      Initiate light functional activities if the physician permits      In pain-free ROM, starting at waist level activities, progression to shoulder level activities, and overhead activities          Week 10      Continue with all exercises listed above      Progress the fundamental shoulder exercises      Strengthening exercises:  Addition of the following      Standing lateral raise to 90°      Prone horizontal abduction-Ts'      Prone scaption - Ys      Initiate isotonic resistance (0.5kg weight) during flexion and abduction the patient exhibits nonpainful normal motion without substitution patterns          Weeks 11-14      Progress all exercises      Continue ROM and flexibility exercises      Stretch posterior capsule with cross body adduction stretching      Progress strengthening program (increase 0.5kg/10days if nonpainful)      No residual pain should be present following exercises Criteria to advance to Phase III      Full AROM and PROM      Pain-free with all strengthening exercises      Dynamic shoulder stability        SUBJECTIVE:  Pt reports no shoulder issues. Pain level:  1/10    OBJECTIVE:     Observation/palpation: hypomobile post/inf GH ,mobility      Pt has significantly poor positioning of B shoulders due to postural issues. Exercises/Interventions:     HEP:  [employer] (see scanned forms)  Access Code: [email or web address]  URL: [employer]/  Date: 11/28/2022  Prepared by: Candida Alpers      SEE PREVIOUS VISIT NOTES FOR HEP    Therapeutic Ex (81944)  NMR re-education (55397) Reps/Sets/time Notes/CUES/Assessment HEP       Supine BUE protraction  3#  2x15x3\"     Supine \"crosses\" @ 90/90 3#  10x ea w manual targets     Requires VC/MC to scap set and NOT push past pain    Improved scap set    VC        Cont'd cues            MC    MC    TB walk outs RTB  15x5\"  Small step Better at beginning of session w RTB    Seated EOB high table flexion roll outs 10x10\"     RUE only stagger rows w scap set BTB  R/L posterior 15x3\" ea     RUE only high row stagger stance w scap set BTB  R/L posterior  153\" ea                                                                                                                 Pt education x5' progression    Manual Intervention (29741)      TPR/STM post cuff and lats  GII-III inf/post GH mobs  PROM ER/IR/FLX x15'                       Therapeutic Activity (88848)                  Therapeutic Exercise and NMR EXR  [x] (19504) Provided verbal/tactile cueing for activities related to strengthening, flexibility, endurance, ROM for improvements in LE, proximal hip, and core control with self care, mobility, lifting, ambulation.   [x] (74317) Provided verbal/tactile cueing for activities related to improving balance, coordination, kinesthetic sense, posture, motor skill, proprioception  to assist with LE, proximal hip, and core control in self care, mobility, lifting, ambulation and eccentric single leg control. NMR and Therapeutic Activities:    [x] (28152 or 64087) Provided verbal/tactile cueing for activities related to improving balance, coordination, kinesthetic sense, posture, motor skill, proprioception and motor activation to allow for proper function of core, proximal hip and LE with self care and ADLs  [] (90132) Gait Re-education- Provided training and instruction to the patient for proper LE, core and proximal hip recruitment and positioning and eccentric body weight control with ambulation re-education including up and down stairs     Home Exercise Program:    [x] (71682) Reviewed/Progressed HEP activities related to strengthening, flexibility, endurance, ROM of core, proximal hip and LE for functional self-care, mobility, lifting and ambulation/stair navigation   [] (54511)Reviewed/Progressed HEP activities related to improving balance, coordination, kinesthetic sense, posture, motor skill, proprioception of core, proximal hip and LE for self care, mobility, lifting, and ambulation/stair navigation      Manual Treatments:  PROM / STM / Oscillations-Mobs:  G-I, II, III, IV (PA's, Inf., Post.)  [x] (89245) Provided manual therapy to mobilize LE, proximal hip and/or LS spine soft tissue/joints for the purpose of modulating pain, promoting relaxation,  increasing ROM, reducing/eliminating soft tissue swelling/inflammation/restriction, improving soft tissue extensibility and allowing for proper ROM for normal function with self care, mobility, lifting and ambulation. Trigger point Dry Needling:  fine needle insertion into myofascial trigger points to stimulate a healing response  [] (16324) Needle insertion without injection: 1 or 2 muscles  [] (61083) Needle insertion without injection: 3 or more muscles    Modalities:  CP x10' post session   [] GAME READY (VASO)- for significant edema, swelling, pain control.     [] ESU (HV/PM) for edema and pain control      Charges: 11:06/12:01  Timed Code Treatment Minutes: 55   Total Treatment Minutes: 55       [] EVAL (LOW) 93602 (typically 20 minutes face-to-face)  [] EVAL (MOD) 91040 (typically 30 minutes face-to-face)  [] EVAL (HIGH) 23644 (typically 45 minutes face-to-face)  [] RE-EVAL     [x] IV(03154) x 2  [] IONTO  [x] NMR (63005) x  1  [] VASO  [x] Manual (02746) x 1     [] Other:  [] TA x      [] Mech Traction (79590)  [] ES(attended) (16559)      [] ES (un) (53231):             GOALS:    Patient stated goal: to get mobility back  [x] Progressing: [] Met: [] Not Met: [] Adjusted     Therapist goals for Patient:   Short Term Goals: To be achieved in: 2 weeks    2/2 MET    1. Independent in HEP and progression per patient tolerance, in order to prevent re-injury. [] Progressing: [x] Met: [] Not Met: [] Adjusted  2. Patient will have a decrease in pain to facilitate improvement in movement, function, and ADLs as indicated by Functional Deficits. [] Progressing: [x] Met: [] Not Met: [] Adjusted     Long Term Goals: To be achieved in: 12 weeks        1. FOTO score to be equal or greater to the predicted score (65/100)  to assist with reaching prior level of function. [] Progressing: [] Met: [] Not Met: [] Adjusted      2. Patient will demonstrate increased AROM to equal the opposite side bilaterally to allow for reaching into upper kitchen cabinets as indicated by patients Functional Deficits. [] Progressing: [] Met: [] Not Met: [] Adjusted       3. Patient will demonstrate an increase in strength to 4+/5 to allow for lifting paint buckets less than 5 lbs  as indicated by patients Functional Deficits. [] Progressing: [] Met: [] Not Met: [] Adjusted       4. 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  (Pt specific functional or activity goal)Pt will be able to sleep w/o sleep aids or waking from shoulder pain. [] Progressing: [] Met: [] Not Met: [] Adjusted  6. Pt will demonstrate pain decreased by 50%  (0-1/10) with all ADLS. [] Progressing: [] Met: [] Not Met: [] Adjusted          ASSESSMENT:     Pt demonstrated improved ROM and joint mobility this visit. He was able to complete all challenges well with little cuing. Pt should be ready for continued HEP discharge within 4 weeks. Patient will benefit from continued skilled intervention to increase ROM, flexibility, strength and function. Overall Progression Towards Functional goals/ Treatment Progress Update:  [x] Patient is progressing as expected towards functional goals listed. [] Progression is slowed due to complexities/Impairments listed. [] Progression has been slowed due to co-morbidities. [] Plan just implemented, too soon to assess goals progression <30days   [] Goals require adjustment due to lack of progress  [] Patient is not progressing as expected and requires additional follow up with physician  [] Other    Prognosis for POC: [x] Good [] Fair  [] Poor      Patient requires continued skilled intervention: [x] Yes  [] No    Treatment/Activity Tolerance:  [x] Patient able to complete treatment  [] Patient limited by fatigue  [] Patient limited by pain    [] Patient limited by other medical complications  [] Other:     PLAN: Cont per protocol. Add therex slowly. [x] Continue per plan of care [] Alter current plan (see comments above)  [] Plan of care initiated [] Hold pending MD visit [] Discharge      Electronically signed by:  Rohan Koenig, PT, [id number]     Note: If patient does not return for scheduled/ recommended follow up visits, this note will serve as a discharge from care along with most recent update on progress.

## 2023-01-17 ENCOUNTER — HOSPITAL ENCOUNTER (OUTPATIENT)
Dept: PHYSICAL THERAPY | Age: 68
Setting detail: THERAPIES SERIES
Discharge: HOME | End: 2023-01-17
Payer: MEDICARE

## 2023-01-17 PROCEDURE — 97110 THERAPEUTIC EXERCISES: CPT | Performed by: PHYSICAL THERAPIST

## 2023-01-17 PROCEDURE — 97140 MANUAL THERAPY 1/> REGIONS: CPT | Performed by: PHYSICAL THERAPIST

## 2023-01-17 PROCEDURE — 97112 NEUROMUSCULAR REEDUCATION: CPT | Performed by: PHYSICAL THERAPIST

## 2023-01-17 NOTE — FLOWSHEET NOTE
[facility], [address]  Phone: [phone]  Fax [phone]  :  Physical Therapy Treatment Note/ Progress Report:           Date:  2023    Patient Name:  Eloy Chowdhury    :  1955  MRN: [id number]    Referring Physician: Mago Mina MD/Pedro Rojo                                                    Evaluation Date: 2022                                         Date of Referral:22     Insurance: [facility] Medicare BMN no auth     Next MD appt: scheduled:  23     Medical Diagnosis:  S46.011A (ICD-10-CM) - Traumatic complete tear of right rotator cuff, initial encounter     Treatment Diagnosis:  R  massive RCR and bicep tenodesis DOSx 10/26/22  R shoulder pain M25.511  R shoulder stiffness M215.611        Precautions/ Contra-indications: see below        Has the plan of care been signed (Y/N):        []  Yes  [x]  No    Progress report will be due (10 Rx or 30 days ):   DUE visit 10 or 22 DONE visit 8 22  DUE visit 18 92      Recertification will be due (POC Duration  / 90 days ): 23     Is this a Progress Report:     []  Yes  [x]  No      If Yes:  Date Range for reporting period:  Beginnin23-  Ending:               Visit # Date Range Insurance Allowable Requires auth   IN PERSON  9 22-22 BMN    [x]no        []yes:   2023 5 1/3/23-     TOTAL              CX/NS       Next PT appt: 23                PT Practice Pattern:D  Co morbidities:1-2  Surgical:R RCR and bicep tenodesis massive repair  DOS 10/26/22    INITIAL VISIT 22 CURRENT VISIT  1/3/23   PAIN 0-10/10 2/10 1/10   FUNCTIONAL SCALE FOTO 40/100 59/100   PROM SFLX 30 155    SER  0 at 0 deg abd 60 deg at 45-50 deg abd    Shoulder IR:      Shoulder Abduction NT NT   AROM Shoulder flexion NT 90    Shoulder abd NT 90    Shoulder ER NT          STRENGHT (MMT)   DNT Latex Allergy/Pacemaker/Adhesive allergy:  [x]NO      []YES     RESTRICTIONS/PRECAUTIONS:           Weeks 11-14      Progress all exercises      Continue ROM and flexibility exercises      Stretch posterior capsule with cross body adduction stretching      Progress strengthening program (increase 0.5kg/10days if nonpainful)      No residual pain should be present following exercises     Criteria to advance to Phase III      Full AROM and PROM      Pain-free with all strengthening exercises      Dynamic shoulder stability        SUBJECTIVE:  Pt reports no shoulder issues. Pain level:  1/10    OBJECTIVE: AAROM SFLX 165 deg    Observation/palpation: hypomobile post/inf GH ,mobility  Hypomobile thoracic PAs      Pt has significantly poor positioning of B shoulders due to postural issues. Exercises/Interventions:     HEP:  [employer] (see scanned forms)  Access Code: [email or web address]  URL: [email or web address]  Date: 11/28/2022  Prepared by: Akila Valencia      SEE PREVIOUS VISIT NOTES FOR HEP    Therapeutic Ex (52686)  NMR re-education (84139) Reps/Sets/time Notes/CUES/Assessment HEP           Supine \"crosses\" @ 90/90 3#  10x ea      Requires VC/MC to scap set and NOT push past pain    SLER w scap set 1#  2x15x3\" Improved scap set    SLABD to 90 1#  10x5\" VC    Prone ext 3#       Cont'd cues    Prone retraction 4#  2x10x3\"         [address]    MC    Better at beginning of session w RTB        RUE only stagger rows w scap set BTB  R/L posterior 15x3\" ea     RUE only high row stagger stance w scap set BTB  R/L posterior  15x3\" ea     Supine chest press and flex 3#  15x3\"     TB IR  ER      Open book 10x5\"     Supine pec stretch w towel roll Unable to feel stretch     Supine hands behind head stretch w towel roll 15x3\"                                                                                   Pt education x5' progression    Manual Intervention ([id number])      TPR/STM post cuff and lats  GII-III inf/post GH mobs  PROM ER/IR/FLX x11'     Prone T spine PAs GII-III x5'                 Therapeutic Activity (65227)                  Therapeutic Exercise and NMR EXR  [x] (36014) Provided verbal/tactile cueing for activities related to strengthening, flexibility, endurance, ROM for improvements in LE, proximal hip, and core control with self care, mobility, lifting, ambulation. [x] (59288) Provided verbal/tactile cueing for activities related to improving balance, coordination, kinesthetic sense, posture, motor skill, proprioception  to assist with LE, proximal hip, and core control in self care, mobility, lifting, ambulation and eccentric single leg control.      NMR and Therapeutic Activities:    [x] (28661 or 36706) Provided verbal/tactile cueing for activities related to improving balance, coordination, kinesthetic sense, posture, motor skill, proprioception and motor activation to allow for proper function of core, proximal hip and LE with self care and ADLs  [] (07928) Gait Re-education- Provided training and instruction to the patient for proper LE, core and proximal hip recruitment and positioning and eccentric body weight control with ambulation re-education including up and down stairs     Home Exercise Program:    [x] (66127) Reviewed/Progressed HEP activities related to strengthening, flexibility, endurance, ROM of core, proximal hip and LE for functional self-care, mobility, lifting and ambulation/stair navigation   [] (34053)Reviewed/Progressed HEP activities related to improving balance, coordination, kinesthetic sense, posture, motor skill, proprioception of core, proximal hip and LE for self care, mobility, lifting, and ambulation/stair navigation      Manual Treatments:  PROM / STM / Oscillations-Mobs:  G-I, II, III, IV (PA's, Inf., Post.)  [x] (06285) Provided manual therapy to mobilize LE, proximal hip and/or LS spine soft tissue/joints for the purpose of modulating pain, promoting relaxation,  increasing ROM, reducing/eliminating soft tissue swelling/inflammation/restriction, improving soft tissue extensibility and allowing for proper ROM for normal function with self care, mobility, lifting and ambulation. Trigger point Dry Needling:  fine needle insertion into myofascial trigger points to stimulate a healing response  [] (82967) Needle insertion without injection: 1 or 2 muscles  [] (43293) Needle insertion without injection: 3 or more muscles    Modalities:  CP x10' post session   [] GAME READY (VASO)- for significant edema, swelling, pain control. [] ESU (HV/PM) for edema and pain control      Charges:   Timed Code Treatment Minutes: 60   Total Treatment Minutes: 70       [] EVAL (LOW) 18720 (typically 20 minutes face-to-face)  [] EVAL (MOD) 08754 (typically 30 minutes face-to-face)  [] EVAL (HIGH) 18680 (typically 45 minutes face-to-face)  [] RE-EVAL     [x] QL(05873) x 2  [] IONTO  [x] NMR (95004) x  1  [] VASO  [x] Manual (73183) x 1     [] Other:  [] TA x      [] Mech Traction (41887)  [] ES(attended) (95802)      [] ES (un) (07866):             GOALS:    Patient stated goal: to get mobility back  [x] Progressing: [] Met: [] Not Met: [] Adjusted     Therapist goals for Patient:   Short Term Goals: To be achieved in: 2 weeks    2/2 MET    1. Independent in HEP and progression per patient tolerance, in order to prevent re-injury. [] Progressing: [x] Met: [] Not Met: [] Adjusted  2. Patient will have a decrease in pain to facilitate improvement in movement, function, and ADLs as indicated by Functional Deficits. [] Progressing: [x] Met: [] Not Met: [] Adjusted     Long Term Goals: To be achieved in: 12 weeks        . [] Progressing: [] Met: [] Not Met: [x] Adjusted  FOTO no longer appropriate, DC from use     2.  Patient will demonstrate increased AROM to equal the opposite side bilaterally to allow for reaching into upper kitchen cabinets as indicated by patients Functional Deficits. [x] Progressing: [] Met: [] Not Met: [] Adjusted       3. Patient will demonstrate an increase in strength to 4+/5 to allow for lifting paint buckets less than 5 lbs  as indicated by patients Functional Deficits. [] Progressing: [] Met: [] Not Met: [] Adjusted       4. 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 (Pt specific functional or activity goal)Pt will be able to sleep w/o sleep aids or waking from shoulder pain. [] Progressing: [] Met: [] Not Met: [] Adjusted  6. Pt will demonstrate pain decreased by 50%  (0-1/10) with all ADLS. [] Progressing: [] Met: [] Not Met: [] Adjusted          ASSESSMENT:     Pt has such sever anterior humeral head on R>L UE and tight post cuff he cannot get pec stretch due to hyper mobile anterior capsule. Pt demonstrated improved ROM and joint mobility this visit. He was able to complete all challenges well with little cuing. Pt should be ready for continued HEP discharge within 4 weeks. Patient will benefit from continued skilled intervention to increase ROM, flexibility, strength and function. Overall Progression Towards Functional goals/ Treatment Progress Update:  [x] Patient is progressing as expected towards functional goals listed. [] Progression is slowed due to complexities/Impairments listed. [] Progression has been slowed due to co-morbidities.   [] Plan just implemented, too soon to assess goals progression <30days   [] Goals require adjustment due to lack of progress  [] Patient is not progressing as expected and requires additional follow up with physician  [] Other    Prognosis for POC: [x] Good [] Fair  [] Poor      Patient requires continued skilled intervention: [x] Yes  [] No    Treatment/Activity Tolerance:  [x] Patient able to complete treatment  [] Patient limited by fatigue  [] Patient limited by pain    [] Patient limited by other medical complications  [] Other:     PLAN: Cont per protocol. Add therex slowly.    [x] Continue per plan of care [] Alter current plan (see comments above)  [] Plan of care initiated [] Hold pending MD visit [] Discharge      Electronically signed by:  Ethel Cherry, PT, [id number]     Note: If patient does not return for scheduled/ recommended follow up visits, this note will serve as a discharge from care along with most recent update on progress.

## 2023-01-19 ENCOUNTER — HOSPITAL ENCOUNTER (OUTPATIENT)
Dept: PHYSICAL THERAPY | Age: 68
Setting detail: THERAPIES SERIES
Discharge: HOME | End: 2023-01-19
Payer: MEDICARE

## 2023-01-19 PROCEDURE — 97110 THERAPEUTIC EXERCISES: CPT | Performed by: PHYSICAL THERAPIST

## 2023-01-19 PROCEDURE — 97112 NEUROMUSCULAR REEDUCATION: CPT | Performed by: PHYSICAL THERAPIST

## 2023-01-19 PROCEDURE — 97140 MANUAL THERAPY 1/> REGIONS: CPT | Performed by: PHYSICAL THERAPIST

## 2023-01-19 NOTE — FLOWSHEET NOTE
[facility], [address]  Phone: [phone]  Fax [phone]  :  Physical Therapy Treatment Note/ Progress Report:           Date:  2023    Patient Name:  Lara Patel    :  1955  MRN: [id number]    Referring Physician: Cristian Curran MD/Pedro Talley                                                    Evaluation Date: 2022                                         Date of Referral:22     Insurance: [facility] Medicare BMN no Bhavik Grimm MD appt: scheduled:  23     Medical Diagnosis:  S46.011A (ICD-10-CM) - Traumatic complete tear of right rotator cuff, initial encounter     Treatment Diagnosis:  R  massive RCR and bicep tenodesis DOSx 10/26/22  R shoulder pain M25.511  R shoulder stiffness M215.611        Precautions/ Contra-indications: see below        Has the plan of care been signed (Y/N):        []  Yes  [x]  No    Progress report will be due (10 Rx or 30 days ):   DUE visit 10 or 22 DONE visit 8 22  DUE visit 18 3/67/11      Recertification will be due (POC Duration  / 90 days ): 23     Is this a Progress Report:     []  Yes  [x]  No      If Yes:  Date Range for reporting period:  Beginnin23-  Ending:               Visit # Date Range Insurance Allowable Requires auth   IN PERSON  9 22-22 BMN    [x]no        []yes:   2023 6 1/3/23-     TOTAL              CX/NS       Next PT appt: 23                PT Practice Pattern:D  Co morbidities:1-2  Surgical:R RCR and bicep tenodesis massive repair  DOS 10/26/22    INITIAL VISIT 22 CURRENT VISIT  1/3/23   PAIN 0-10/10 2/10 1/10   FUNCTIONAL SCALE FOTO 40/100 59/100   PROM SFLX 30 155    SER  0 at 0 deg abd 60 deg at 45-50 deg abd    Shoulder IR:      Shoulder Abduction NT NT   AROM Shoulder flexion NT 90    Shoulder abd NT 90    Shoulder ER NT          STRENGHT (MMT)   DNT Latex Allergy/Pacemaker/Adhesive allergy:  [x]NO      []YES     RESTRICTIONS/PRECAUTIONS:           Weeks 11-14      Progress all exercises      Continue ROM and flexibility exercises      Stretch posterior capsule with cross body adduction stretching      Progress strengthening program (increase 0.5kg/10days if nonpainful)      No residual pain should be present following exercises     Criteria to advance to Phase III      Full AROM and PROM      Pain-free with all strengthening exercises      Dynamic shoulder stability        SUBJECTIVE:  Pt reports no shoulder issues except     Pain level:  1/10    OBJECTIVE: AAROM SFLX 165 deg    Observation/palpation: hypomobile post/inf GH ,mobility  Hypomobile thoracic PAs      Pt has significantly poor positioning of B shoulders due to postural issues. Exercises/Interventions:     HEP:  [employer] (see scanned forms)  Access Code: [email or web address]  URL: [email or web address]  Date: 11/28/2022  Prepared by: Martha Buckley      SEE PREVIOUS VISIT NOTES FOR HEP    Therapeutic Ex (34643)  NMR re-education (40512) Reps/Sets/time Notes/CUES/Assessment HEP               Requires VC/MC to scap set and NOT push past pain    Improved scap set    VC        Cont'd cues            MC    MC    Better at beginning of session w RTB              RUE  row stagger stance w scap set CC 10#   R/L posterior  10x3\" ea     Supine chest press and flex 3#  15x3\"     TB   IR  ER NV     Open book 1#  2x10x5\"             Pulleys FLX 15x5\"     Prone I,T,Y  R/L 10x ea     CC BUE seated rows  CC lat pulls to chest  CC Tricep pulls 30# 2x10x3\"  30# 2x10x3\"  20# 2x10x3\"                                                                   Pt education x5' progression    Manual Intervention (96493)      TPR/STM post cuff and lats  X3'  BUEs     Prone T spine PAs GII-III x5'                 Therapeutic Activity (27901)                  Therapeutic Exercise and NMR EXR  [x] (66159) Provided verbal/tactile cueing for activities related to strengthening, flexibility, endurance, ROM for improvements in LE, proximal hip, and core control with self care, mobility, lifting, ambulation.  [x] (44626) Provided verbal/tactile cueing for activities related to improving balance, coordination, kinesthetic sense, posture, motor skill, proprioception  to assist with LE, proximal hip, and core control in self care, mobility, lifting, ambulation and eccentric single leg control.     NMR and Therapeutic Activities:    [x] (45291 or 49270) Provided verbal/tactile cueing for activities related to improving balance, coordination, kinesthetic sense, posture, motor skill, proprioception and motor activation to allow for proper function of core, proximal hip and LE with self care and ADLs  [] (70539) Gait Re-education- Provided training and instruction to the patient for proper LE, core and proximal hip recruitment and positioning and eccentric body weight control with ambulation re-education including up and down stairs     Home Exercise Program:    [x] (01641) Reviewed/Progressed HEP activities related to strengthening, flexibility, endurance, ROM of core, proximal hip and LE for functional self-care, mobility, lifting and ambulation/stair navigation   [] (32129)Reviewed/Progressed HEP activities related to improving balance, coordination, kinesthetic sense, posture, motor skill, proprioception of core, proximal hip and LE for self care, mobility, lifting, and ambulation/stair navigation      Manual Treatments:  PROM / STM / Oscillations-Mobs:  G-I, II, III, IV (PA's, Inf., Post.)  [x] (23860) Provided manual therapy to mobilize LE, proximal hip and/or LS spine soft tissue/joints for the purpose of modulating pain, promoting relaxation,  increasing ROM, reducing/eliminating soft tissue swelling/inflammation/restriction, improving soft tissue extensibility and allowing for proper ROM for normal function with self  care, mobility, lifting and ambulation. Trigger point Dry Needling:  fine needle insertion into myofascial trigger points to stimulate a healing response  [] (14587) Needle insertion without injection: 1 or 2 muscles  [] (17097) Needle insertion without injection: 3 or more muscles    Modalities:  CP x10' post session B shoulders   [] GAME READY (VASO)- for significant edema, swelling, pain control. [] ESU (HV/PM) for edema and pain control      Charges:   Timed Code Treatment Minutes: 50   Total Treatment Minutes: 50       [] EVAL (LOW) 27792 (typically 20 minutes face-to-face)  [] EVAL (MOD) 73904 (typically 30 minutes face-to-face)  [] EVAL (HIGH) 17671 (typically 45 minutes face-to-face)  [] RE-EVAL     [x] HN(93222) x 1  [] IONTO  [x] NMR (41350) x  1  [] VASO  [x] Manual (24905) x 1     [] Other:  [] TA x      [] Mech Traction (27095)  [] ES(attended) (76969)      [] ES (un) (77393):             GOALS:    Patient stated goal: to get mobility back  [x] Progressing: [] Met: [] Not Met: [] Adjusted     Therapist goals for Patient:   Short Term Goals: To be achieved in: 2 weeks    2/2 MET    1. Independent in HEP and progression per patient tolerance, in order to prevent re-injury. [] Progressing: [x] Met: [] Not Met: [] Adjusted  2. Patient will have a decrease in pain to facilitate improvement in movement, function, and ADLs as indicated by Functional Deficits. [] Progressing: [x] Met: [] Not Met: [] Adjusted     Long Term Goals: To be achieved in: 12 weeks        . [] Progressing: [] Met: [] Not Met: [x] Adjusted  FOTO no longer appropriate, DC from use     2. Patient will demonstrate increased AROM to equal the opposite side bilaterally to allow for reaching into upper kitchen cabinets as indicated by patients Functional Deficits. [x] Progressing: [] Met: [] Not Met: [] Adjusted       3.  Patient will demonstrate an increase in strength to 4+/5 to allow for lifting paint buckets less than 5 lbs as indicated by patients Functional Deficits. [] Progressing: [] Met: [] Not Met: [] Adjusted       4. 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 (Pt specific functional or activity goal)Pt will be able to sleep w/o sleep aids or waking from shoulder pain. [] Progressing: [] Met: [] Not Met: [] Adjusted  6. Pt will demonstrate pain decreased by 50%  (0-1/10) with all ADLS. [] Progressing: [] Met: [] Not Met: [] Adjusted          ASSESSMENT:   Pt is having pain in his L shoulder at this time. We will assess it NV and if pain persists will switch to single UE therex. Pt improved in challenging ROM as exercises progressed this visit. Pt should be ready for continued HEP discharge within 3 weeks. Patient will benefit from continued skilled intervention to increase ROM, flexibility, strength and function. Overall Progression Towards Functional goals/ Treatment Progress Update:  [x] Patient is progressing as expected towards functional goals listed. [] Progression is slowed due to complexities/Impairments listed. [] Progression has been slowed due to co-morbidities. [] Plan just implemented, too soon to assess goals progression <30days   [] Goals require adjustment due to lack of progress  [] Patient is not progressing as expected and requires additional follow up with physician  [] Other    Prognosis for POC: [x] Good [] Fair  [] Poor      Patient requires continued skilled intervention: [x] Yes  [] No    Treatment/Activity Tolerance:  [x] Patient able to complete treatment  [] Patient limited by fatigue  [] Patient limited by pain    [] Patient limited by other medical complications  [] Other:     PLAN: Cont per protocol. Add therex slowly.     [x] Continue per plan of care [] Alter current plan (see comments above)  [] Plan of care initiated [] Hold pending MD visit [] Discharge      Electronically signed by:  Tato Jackson, PT, [id number]     Note: If patient does not return for scheduled/ recommended follow up visits, this note will serve as a discharge from care along with most recent update on progress.

## 2023-01-24 ENCOUNTER — HOSPITAL ENCOUNTER (OUTPATIENT)
Dept: PHYSICAL THERAPY | Age: 68
Setting detail: THERAPIES SERIES
Discharge: HOME | End: 2023-01-24
Payer: MEDICARE

## 2023-01-24 PROCEDURE — 97110 THERAPEUTIC EXERCISES: CPT | Performed by: PHYSICAL THERAPIST

## 2023-01-24 PROCEDURE — 97112 NEUROMUSCULAR REEDUCATION: CPT | Performed by: PHYSICAL THERAPIST

## 2023-01-24 NOTE — FLOWSHEET NOTE
[facility], [address]  Phone: [phone]  Fax [phone]  :  Physical Therapy Treatment Note/ Progress Report:           Date:  2023    Patient Name:  Neville Hawk    :  1955  MRN: [id number]    Referring Physician: Dara Hurst MD/Pedro Garcia                                                    Evaluation Date: 2022                                         Date of Referral:22     Insurance: [facility] Medicare BMN no auth     Next MD appt: scheduled:  23     Medical Diagnosis:  S46.011A (ICD-10-CM) - Traumatic complete tear of right rotator cuff, initial encounter     Treatment Diagnosis:  R  massive RCR and bicep tenodesis DOSx 10/26/22  R shoulder pain M25.511  R shoulder stiffness M215.611        Precautions/ Contra-indications: see below        Has the plan of care been signed (Y/N):        []  Yes  [x]  No    Progress report will be due (10 Rx or 30 days ):   DUE visit 10 or 22 DONE visit 8 22  DUE visit 18       Recertification will be due (POC Duration  / 90 days ): 23     Is this a Progress Report:     []  Yes  [x]  No      If Yes:  Date Range for reporting period:  Beginnin23-  Ending:               Visit # Date Range Insurance Allowable Requires auth   IN PERSON  9 22-22 BMN    [x]no        []yes:   2023 7 1/3/23-     TOTAL              CX/NS       Next PT appt: 23                PT Practice Pattern:D  Co morbidities:1-2  Surgical:R RCR and bicep tenodesis massive repair  DOS 10/26/22    INITIAL VISIT 22 CURRENT VISIT  1/3/23   PAIN 0-10/10 2/10 1/10   FUNCTIONAL SCALE FOTO 40/100 59/100   PROM SFLX 30 155    SER  0 at 0 deg abd 60 deg at 45-50 deg abd    Shoulder IR:      Shoulder Abduction NT NT   AROM Shoulder flexion NT 90    Shoulder abd NT 90    Shoulder ER NT          STRENGHT (MMT)   DNT Latex Allergy/Pacemaker/Adhesive allergy:  [x]NO      []YES     RESTRICTIONS/PRECAUTIONS:           Weeks 11-14      Progress all exercises      Continue ROM and flexibility exercises      Stretch posterior capsule with cross body adduction stretching      Progress strengthening program (increase 0.5kg/10days if nonpainful)      No residual pain should be present following exercises     Criteria to advance to Phase III      Full AROM and PROM      Pain-free with all strengthening exercises      Dynamic shoulder stability        SUBJECTIVE:  Pt reports no shoulder issues except I was ore because of shoveling the snow and using the . Pain level:  1/10    OBJECTIVE: AAROM SFLX 165 deg    Observation/palpation: hypomobile post/inf GH ,mobility  Hypomobile thoracic PAs      Pt has significantly poor positioning of B shoulders due to postural issues. Exercises/Interventions:     HEP:  [employer] (see scanned forms)  Access Code: [email or web address]  URL: [email or web address]  Date: 11/28/2022  Prepared by: Dale Benz      SEE PREVIOUS VISIT NOTES FOR HEP    Therapeutic Ex (19656)  NMR re-education (02049) Reps/Sets/time Notes/CUES/Assessment HEP               Requires VC/MC to scap set and NOT push past pain    Improved scap set    VC        Cont'd cues            MC    MC    Better at beginning of session w RTB                    Supine chest press and flex/press 2#  15x3\"     TB   IR  ER RTB  2x15x3\"  2x15x3\"                 Pulleys FLX 15x5\"     Prone I,T,Y  R/L 10x ea     CC Tricep pulls  CC RUE standing rows R/L posterior stance  CC RUE high rows R/L posterior stance 30# 2x10x3\"  30# 2x10x3\"  20# 2x15x3\"  20# 15x3\" ea    20# 15x3\" ea     Terry stretch 4 position FLX stretch  Terry stretch thoracic rotation stretch to R 2x10\" ea  10x5\"     Wall shifts WB 10x MC                                                    Pt education x5' progression    Manual Intervention (47574) Therapeutic Activity (25467)                  Therapeutic Exercise and NMR EXR  [x] (87225) Provided verbal/tactile cueing for activities related to strengthening, flexibility, endurance, ROM for improvements in LE, proximal hip, and core control with self care, mobility, lifting, ambulation. [x] (06588) Provided verbal/tactile cueing for activities related to improving balance, coordination, kinesthetic sense, posture, motor skill, proprioception  to assist with LE, proximal hip, and core control in self care, mobility, lifting, ambulation and eccentric single leg control.      NMR and Therapeutic Activities:    [x] (90636 or 14374) Provided verbal/tactile cueing for activities related to improving balance, coordination, kinesthetic sense, posture, motor skill, proprioception and motor activation to allow for proper function of core, proximal hip and LE with self care and ADLs  [] (66493) Gait Re-education- Provided training and instruction to the patient for proper LE, core and proximal hip recruitment and positioning and eccentric body weight control with ambulation re-education including up and down stairs     Home Exercise Program:    [x] (39193) Reviewed/Progressed HEP activities related to strengthening, flexibility, endurance, ROM of core, proximal hip and LE for functional self-care, mobility, lifting and ambulation/stair navigation   [] (34959)Reviewed/Progressed HEP activities related to improving balance, coordination, kinesthetic sense, posture, motor skill, proprioception of core, proximal hip and LE for self care, mobility, lifting, and ambulation/stair navigation      Manual Treatments:  PROM / STM / Oscillations-Mobs:  G-I, II, III, IV (PA's, Inf., Post.)  [x] (04777) Provided manual therapy to mobilize LE, proximal hip and/or LS spine soft tissue/joints for the purpose of modulating pain, promoting relaxation,  increasing ROM, reducing/eliminating soft tissue swelling/inflammation/restriction, improving soft tissue extensibility and allowing for proper ROM for normal function with self care, mobility, lifting and ambulation. Trigger point Dry Needling:  fine needle insertion into myofascial trigger points to stimulate a healing response  [] (22715) Needle insertion without injection: 1 or 2 muscles  [] (21687) Needle insertion without injection: 3 or more muscles    Modalities:  CP x10' post session B shoulders   [] GAME READY (VASO)- for significant edema, swelling, pain control. [] ESU (HV/PM) for edema and pain control      Charges:   Timed Code Treatment Minutes: 50   Total Treatment Minutes: 50       [] EVAL (LOW) 16457 (typically 20 minutes face-to-face)  [] EVAL (MOD) 02944 (typically 30 minutes face-to-face)  [] EVAL (HIGH) 45398 (typically 45 minutes face-to-face)  [] RE-EVAL     [x] UD(63307) x 2  [] IONTO  [x] NMR (39688) x  1  [] VASO  [] Manual (82071) x      [] Other:  [] TA x      [] Mech Traction (26846)  [] ES(attended) (35274)      [] ES (un) (19883):             GOALS:    Patient stated goal: to get mobility back  [x] Progressing: [] Met: [] Not Met: [] Adjusted     Therapist goals for Patient:   Short Term Goals: To be achieved in: 2 weeks    2/2 MET    1. Independent in HEP and progression per patient tolerance, in order to prevent re-injury. [] Progressing: [x] Met: [] Not Met: [] Adjusted  2. Patient will have a decrease in pain to facilitate improvement in movement, function, and ADLs as indicated by Functional Deficits. [] Progressing: [x] Met: [] Not Met: [] Adjusted     Long Term Goals: To be achieved in: 12 weeks        . [] Progressing: [] Met: [] Not Met: [x] Adjusted  FOTO no longer appropriate, DC from use     2. Patient will demonstrate increased AROM to equal the opposite side bilaterally to allow for reaching into upper kitchen cabinets as indicated by patients Functional Deficits.    [x] Progressing: [] Met: [] Not Met: [] Adjusted       3. Patient will demonstrate an increase in strength to 4+/5 to allow for lifting paint buckets less than 5 lbs  as indicated by patients Functional Deficits. [] Progressing: [] Met: [] Not Met: [] Adjusted       4. 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 (Pt specific functional or activity goal)Pt will be able to sleep w/o sleep aids or waking from shoulder pain. [] Progressing: [] Met: [] Not Met: [] Adjusted  6. Pt will demonstrate pain decreased by 50%  (0-1/10) with all ADLS. [] Progressing: [] Met: [] Not Met: [] Adjusted          ASSESSMENT:   L shoudler felt fine post session. Pt improved in challenging ROM as exercises progressed this visit. Pt should be ready for continued HEP discharge within 3 weeks. Patient will benefit from continued skilled intervention to increase ROM, flexibility, strength and function. Overall Progression Towards Functional goals/ Treatment Progress Update:  [x] Patient is progressing as expected towards functional goals listed. [] Progression is slowed due to complexities/Impairments listed. [] Progression has been slowed due to co-morbidities. [] Plan just implemented, too soon to assess goals progression <30days   [] Goals require adjustment due to lack of progress  [] Patient is not progressing as expected and requires additional follow up with physician  [] Other    Prognosis for POC: [x] Good [] Fair  [] Poor      Patient requires continued skilled intervention: [x] Yes  [] No    Treatment/Activity Tolerance:  [x] Patient able to complete treatment  [] Patient limited by fatigue  [] Patient limited by pain    [] Patient limited by other medical complications  [] Other:     PLAN: Cont per protocol. Add therex slowly.     [x] Continue per plan of care [] Alter current plan (see comments above)  [] Plan of care initiated [] Hold pending MD visit [] Discharge      Electronically signed by:  Alicia Lerma, PT, [id number]     Note: If patient does not return for scheduled/ recommended follow up visits, this note will serve as a discharge from care along with most recent update on progress.

## 2023-01-26 ENCOUNTER — HOSPITAL ENCOUNTER (OUTPATIENT)
Dept: PHYSICAL THERAPY | Age: 68
Setting detail: THERAPIES SERIES
Discharge: HOME | End: 2023-01-26
Payer: MEDICARE

## 2023-01-26 PROCEDURE — 97112 NEUROMUSCULAR REEDUCATION: CPT | Performed by: PHYSICAL THERAPIST

## 2023-01-26 PROCEDURE — 97110 THERAPEUTIC EXERCISES: CPT | Performed by: PHYSICAL THERAPIST

## 2023-01-26 NOTE — FLOWSHEET NOTE
[facility], [address]  Phone: [phone]  Fax [phone]  :  Physical Therapy Treatment Note/ Progress Report:           Date:  2023    Patient Name:  Dean Cruz    :  1955  MRN: [id number]    Referring Physician: Constantino Dobson MD/Pedro Grijalva                                                    Evaluation Date: 2022                                         Date of Referral:22     Insurance: [facility] Medicare BMN no auth     Next MD appt: scheduled:  23     Medical Diagnosis:  S46.011A (ICD-10-CM) - Traumatic complete tear of right rotator cuff, initial encounter     Treatment Diagnosis:  R  massive RCR and bicep tenodesis DOSx 10/26/22  R shoulder pain M25.511  R shoulder stiffness M215.611        Precautions/ Contra-indications: see below        Has the plan of care been signed (Y/N):        []  Yes  [x]  No    Progress report will be due (10 Rx or 30 days ):   DUE visit 10 or 22 DONE visit 8 22  DUE visit 18       Recertification will be due (POC Duration  / 90 days ): 23     Is this a Progress Report:     []  Yes  [x]  No      If Yes:  Date Range for reporting period:  Beginnin23-  Endin23              Visit # Date Range Insurance Allowable Requires auth   IN PERSON  9 22-22 BMN    [x]no        []yes:   2023 8 1/3/23-     TOTAL              CX/NS       Next PT appt:                 PT Practice Pattern:D  Co morbidities:1-2  Surgical:R RCR and bicep tenodesis massive repair  DOS 10/26/22    INITIAL VISIT 22 CURRENT VISIT  1/3/23   PAIN 0-10/10 2/10 110   FUNCTIONAL SCALE FOTO 40/100 59/100   PROM SFLX 30 155    SER  0 at 0 deg abd 60 deg at 45-50 deg abd    Shoulder IR:      Shoulder Abduction NT NT   AROM Shoulder flexion NT 90    Shoulder abd NT 90    Shoulder ER NT          STRENGHT (MMT)   DNT Latex Allergy/Pacemaker/Adhesive allergy:  [x]NO      []YES     RESTRICTIONS/PRECAUTIONS:           Weeks 11-14      Progress all exercises      Continue ROM and flexibility exercises      Stretch posterior capsule with cross body adduction stretching      Progress strengthening program (increase 0.5kg/10days if nonpainful)      No residual pain should be present following exercises     Criteria to advance to Phase III      Full AROM and PROM      Pain-free with all strengthening exercises      Dynamic shoulder stability        SUBJECTIVE:  Pt reports that he was sore after the last visit until yesterday but also that his L shoulder doesn't not hurt any longer. Pain level:  1/10    OBJECTIVE:     Observation/palpation: hypomobile post/inf GH ,mobility  Hypomobile thoracic PAs      Pt has significantly poor positioning of B shoulders due to postural issues. Exercises/Interventions:     HEP:  [employer] (see scanned forms)  Access Code: [email or web address]  URL: [email or web address]  Date: 11/28/2022  Prepared by: Camila Greenwood      SEE PREVIOUS VISIT NOTES FOR HEP    Therapeutic Ex (41145)  NMR re-education (89283) Reps/Sets/time Notes/CUES/Assessment HEP               Requires VC/MC to scap set and NOT push past pain    Improved scap set    VC        Cont'd cues            MC    MC    Better at beginning of session w RTB                    Supine chest press and flex/press 2#  15x3\"     TB   IR  ER RTB  2x15x3\"  2x15x3\"                 Pulleys FLX 15x5\"     Prone I,T,Y  R/L 10x ea     CC BUE seated rows  CC lat pulls to chest  CC Tricep pulls  CC RUE standing rows R/L posterior stance  CC RUE high rows R/L posterior stance 30# 2x10x3\"  30# 2x10x3\"  20# 2x10x3\"  20# 2x10x3\" ea    20# 2x10x3\" ea     Terry stretch 4 position FLX stretch  Terry stretch thoracic rotation stretch to R 2x10\" ea  10x5\"     Wall shifts WB 10x MC    shrugs 5#  2x10x3\"     Bicep curls 2#  15x N/sup Pt education x5' progression    Manual Intervention (76669)      TPR/STM post cuff and lats  GII-III inf/post GH mobs  PROM ER/IR/FLX X5'       Prone T spine PAs GII-III x5'                 Therapeutic Activity (30421)                  Therapeutic Exercise and NMR EXR  [x] (27827) Provided verbal/tactile cueing for activities related to strengthening, flexibility, endurance, ROM for improvements in LE, proximal hip, and core control with self care, mobility, lifting, ambulation. [x] (62434) Provided verbal/tactile cueing for activities related to improving balance, coordination, kinesthetic sense, posture, motor skill, proprioception  to assist with LE, proximal hip, and core control in self care, mobility, lifting, ambulation and eccentric single leg control.      NMR and Therapeutic Activities:    [x] (94394 or 64087) Provided verbal/tactile cueing for activities related to improving balance, coordination, kinesthetic sense, posture, motor skill, proprioception and motor activation to allow for proper function of core, proximal hip and LE with self care and ADLs  [] (80173) Gait Re-education- Provided training and instruction to the patient for proper LE, core and proximal hip recruitment and positioning and eccentric body weight control with ambulation re-education including up and down stairs     Home Exercise Program:    [x] (01352) Reviewed/Progressed HEP activities related to strengthening, flexibility, endurance, ROM of core, proximal hip and LE for functional self-care, mobility, lifting and ambulation/stair navigation   [] (63609)Reviewed/Progressed HEP activities related to improving balance, coordination, kinesthetic sense, posture, motor skill, proprioception of core, proximal hip and LE for self care, mobility, lifting, and ambulation/stair navigation      Manual Treatments:  PROM / STM / Oscillations-Mobs:  G-I, II, III, IV (PA's, Inf., Post.)  [x] (78190) Provided manual therapy to mobilize LE, proximal hip and/or LS spine soft tissue/joints for the purpose of modulating pain, promoting relaxation,  increasing ROM, reducing/eliminating soft tissue swelling/inflammation/restriction, improving soft tissue extensibility and allowing for proper ROM for normal function with self care, mobility, lifting and ambulation. Trigger point Dry Needling:  fine needle insertion into myofascial trigger points to stimulate a healing response  [] (65018) Needle insertion without injection: 1 or 2 muscles  [] (38683) Needle insertion without injection: 3 or more muscles    Modalities:  CP x10' post session B shoulders   [] GAME READY (VASO)- for significant edema, swelling, pain control. [] ESU (HV/PM) for edema and pain control      Charges:   Timed Code Treatment Minutes: 50   Total Treatment Minutes: 50       [] EVAL (LOW) 72288 (typically 20 minutes face-to-face)  [] EVAL (MOD) 40315 (typically 30 minutes face-to-face)  [] EVAL (HIGH) 15530 (typically 45 minutes face-to-face)  [] RE-EVAL     [x] OQ(09698) x 2  [] IONTO  [x] NMR (95352) x  1  [] VASO  [] Manual (59168) x      [] Other:  [] TA x      [] Mech Traction (76457)  [] ES(attended) (77578)      [] ES (un) (52384):             GOALS:    Patient stated goal: to get mobility back  [x] Progressing: [] Met: [] Not Met: [] Adjusted     Therapist goals for Patient:   Short Term Goals: To be achieved in: 2 weeks    2/2 MET    1. Independent in HEP and progression per patient tolerance, in order to prevent re-injury. [] Progressing: [x] Met: [] Not Met: [] Adjusted  2. Patient will have a decrease in pain to facilitate improvement in movement, function, and ADLs as indicated by Functional Deficits. [] Progressing: [x] Met: [] Not Met: [] Adjusted     Long Term Goals: To be achieved in: 12 weeks        . [] Progressing: [] Met: [] Not Met: [x] Adjusted  FOTO no longer appropriate, DC from use     2.  Patient will demonstrate increased AROM to equal the opposite side bilaterally to allow for reaching into upper kitchen cabinets as indicated by patients Functional Deficits. [x] Progressing: [] Met: [] Not Met: [] Adjusted       3. Patient will demonstrate an increase in strength to 4+/5 to allow for lifting paint buckets less than 5 lbs  as indicated by patients Functional Deficits. [] Progressing: [] Met: [] Not Met: [] Adjusted       4. 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 (Pt specific functional or activity goal)Pt will be able to sleep w/o sleep aids or waking from shoulder pain. [] Progressing: [] Met: [] Not Met: [] Adjusted  6. Pt will demonstrate pain decreased by 50%  (0-1/10) with all ADLS. [] Progressing: [] Met: [] Not Met: [] Adjusted          ASSESSMENT:   L shoudler felt fine post session. Pt improved in challenging ROM as exercises progressed this visit. Pt should be ready for continued HEP discharge within 3 weeks. Patient will benefit from continued skilled intervention to increase ROM, flexibility, strength and function. Overall Progression Towards Functional goals/ Treatment Progress Update:  [x] Patient is progressing as expected towards functional goals listed. [] Progression is slowed due to complexities/Impairments listed. [] Progression has been slowed due to co-morbidities.   [] Plan just implemented, too soon to assess goals progression <30days   [] Goals require adjustment due to lack of progress  [] Patient is not progressing as expected and requires additional follow up with physician  [] Other    Prognosis for POC: [x] Good [] Fair  [] Poor      Patient requires continued skilled intervention: [x] Yes  [] No    Treatment/Activity Tolerance:  [x] Patient able to complete treatment  [] Patient limited by fatigue  [] Patient limited by pain    [] Patient limited by other medical complications  [] Other:     PLAN: Cont per protocol. Add therex slowly. [x] Continue per plan of care [] Alter current plan (see comments above)  [] Plan of care initiated [] Hold pending MD visit [] Discharge      Electronically signed by:  Britton Nicole, PT, [id number]     Note: If patient does not return for scheduled/ recommended follow up visits, this note will serve as a discharge from care along with most recent update on progress.

## 2023-01-31 ENCOUNTER — APPOINTMENT (OUTPATIENT)
Dept: PHYSICAL THERAPY | Age: 68
End: 2023-01-31
Payer: MEDICARE

## 2023-01-31 PROCEDURE — 97140 MANUAL THERAPY 1/> REGIONS: CPT | Performed by: PHYSICAL THERAPIST

## 2023-01-31 PROCEDURE — 97112 NEUROMUSCULAR REEDUCATION: CPT | Performed by: PHYSICAL THERAPIST

## 2023-01-31 PROCEDURE — 97110 THERAPEUTIC EXERCISES: CPT | Performed by: PHYSICAL THERAPIST

## 2023-01-31 NOTE — FLOWSHEET NOTE
[facility], [address]  Phone: [phone]  Fax [phone]  :  Physical Therapy Treatment Note/ Progress Report:           Date:  2023    Patient Name:  Martha Alejandro    :  1955  MRN: [id number]    Referring Physician: Buddy Reed MD/Pedro Rivera                                                    Evaluation Date: 2022                                         Date of Referral:22     Insurance: [facility] Medicare BMN no auth     Next MD appt: scheduled:  23     Medical Diagnosis:  X67.732U (ICD-10-CM) - Traumatic complete tear of right rotator cuff, initial encounter     Treatment Diagnosis:  R  massive RCR and bicep tenodesis DOSx 10/26/22  R shoulder pain M25.511  R shoulder stiffness M215.611        Precautions/ Contra-indications: see below        Has the plan of care been signed (Y/N):        [x]  Yes  []  No    Progress report will be due (10 Rx or 30 days ):   DUE visit 10 or 22 DONE visit 8 22  DUE visit 18         Recertification will be due (POC Duration  / 90 days ): 23     Is this a Progress Report:     []  Yes  [x]  No      If Yes:  Date Range for reporting period:  Beginnin23  Ending:               Visit # Date Range Insurance Allowable Requires auth   IN PERSON  9 22-22 BMN    [x]no        []yes:   2023 8 1/3/23-     TOTAL              CX/NS       Next PT appt:                 PT Practice Pattern:D  Co morbidities:1-2  Surgical:R RCR and bicep tenodesis massive repair  DOS 10/26/22    INITIAL VISIT 22 CURRENT VISIT  1/3/23   PAIN 0-10/10 2/10 1/10   FUNCTIONAL SCALE FOTO 40/100 59/100   PROM SFLX 30 155    SER  0 at 0 deg abd 60 deg at 45-50 deg abd    Shoulder IR:      Shoulder Abduction NT NT   AROM Shoulder flexion NT 90    Shoulder abd NT 90    Shoulder ER NT          STRENGHT (MMT) FLX DNT DNT    IR      ER Latex Allergy/Pacemaker/Adhesive allergy:  [x]NO      []YES     RESTRICTIONS/PRECAUTIONS:           Weeks 11-14      Progress all exercises      Continue ROM and flexibility exercises      Stretch posterior capsule with cross body adduction stretching      Progress strengthening program (increase 0.5kg/10days if nonpainful)      No residual pain should be present following exercises     Criteria to advance to Phase III      Full AROM and PROM      Pain-free with all strengthening exercises      Dynamic shoulder stability        SUBJECTIVE:  Pt reports he shoveled his driveway this morning. Pain level:  1/10    OBJECTIVE:     Observation/palpation: hypomobile post/inf GH ,mobility  Hypomobile thoracic PAs  TPs present in teres and lats      Pt has significantly poor positioning of B shoulders due to postural issues. Exercises/Interventions:     HEP:  [employer] (see scanned forms)  Access Code: [email or web address]  URL: [email or web address]  Date: 11/28/2022  Prepared by: Pura Saenz      SEE PREVIOUS VISIT NOTES FOR HEP    Therapeutic Ex (08813)  NMR re-education (20371) Reps/Sets/time Notes/CUES/Assessment HEP   Prone mid trap, rhomboid, ER 1#  10x ea     Supine HAB/D2 EXT RTB  10x Manual assist    Prone ext w scap set 2#  2x15x3\"     SLER 1#  3x10x3\"                                                                             Supine chest press and flex/press 2#  15x3\"     TB   IR  ER GTB  2x15x3\"  2x15x3\"                 Pulleys FLX 15x5\"             Terry stretch 4 position FLX stretch  Terry stretch thoracic rotation stretch to R 2x10\" ea  10x5\"     MC    shrugs 5#  3x10x3\"     Bicep curls 2#  2x10x N/sup     TB wall scap retraction  OVL  10x ea fatigue                                  Pt education x5' progression    Manual Intervention (55426)      SL and supine IASTM subscap, teres, IS x8'     PROM all directions  x5'                 Therapeutic Activity (05093) Therapeutic Exercise and NMR EXR  [x] (76776) Provided verbal/tactile cueing for activities related to strengthening, flexibility, endurance, ROM for improvements in LE, proximal hip, and core control with self care, mobility, lifting, ambulation. [x] (23416) Provided verbal/tactile cueing for activities related to improving balance, coordination, kinesthetic sense, posture, motor skill, proprioception  to assist with LE, proximal hip, and core control in self care, mobility, lifting, ambulation and eccentric single leg control.      NMR and Therapeutic Activities:    [x] (29277 or 79219) Provided verbal/tactile cueing for activities related to improving balance, coordination, kinesthetic sense, posture, motor skill, proprioception and motor activation to allow for proper function of core, proximal hip and LE with self care and ADLs  [] (43558) Gait Re-education- Provided training and instruction to the patient for proper LE, core and proximal hip recruitment and positioning and eccentric body weight control with ambulation re-education including up and down stairs     Home Exercise Program:    [x] (76548) Reviewed/Progressed HEP activities related to strengthening, flexibility, endurance, ROM of core, proximal hip and LE for functional self-care, mobility, lifting and ambulation/stair navigation   [] (57000)Reviewed/Progressed HEP activities related to improving balance, coordination, kinesthetic sense, posture, motor skill, proprioception of core, proximal hip and LE for self care, mobility, lifting, and ambulation/stair navigation      Manual Treatments:  PROM / STM / Oscillations-Mobs:  G-I, II, III, IV (PA's, Inf., Post.)  [x] (62971) Provided manual therapy to mobilize LE, proximal hip and/or LS spine soft tissue/joints for the purpose of modulating pain, promoting relaxation,  increasing ROM, reducing/eliminating soft tissue swelling/inflammation/restriction, improving soft tissue extensibility and allowing for proper ROM for normal function with self care, mobility, lifting and ambulation. Trigger point Dry Needling:  fine needle insertion into myofascial trigger points to stimulate a healing response  [] (45752) Needle insertion without injection: 1 or 2 muscles  [] (94794) Needle insertion without injection: 3 or more muscles    Modalities:  CP x10' post session B shoulders   [] GAME READY (VASO)- for significant edema, swelling, pain control. [] ESU (HV/PM) for edema and pain control      Charges:   Timed Code Treatment Minutes: 53   Total Treatment Minutes: 65       [] EVAL (LOW) 32413 (typically 20 minutes face-to-face)  [] EVAL (MOD) 09768 (typically 30 minutes face-to-face)  [] EVAL (HIGH) 31121 (typically 45 minutes face-to-face)  [] RE-EVAL     [x] RU(94950) x 2  [] IONTO  [x] NMR (62204) x  1  [] VASO  [x] Manual (37148) x 1     [] Other:  [] TA x      [] Mech Traction (91942)  [] ES(attended) (33733)      [] ES (un) (61757):             GOALS:    Patient stated goal: to get mobility back  [x] Progressing: [] Met: [] Not Met: [] Adjusted     Therapist goals for Patient:   Short Term Goals: To be achieved in: 2 weeks    2/2 MET    1. Independent in HEP and progression per patient tolerance, in order to prevent re-injury. [] Progressing: [x] Met: [] Not Met: [] Adjusted  2. Patient will have a decrease in pain to facilitate improvement in movement, function, and ADLs as indicated by Functional Deficits. [] Progressing: [x] Met: [] Not Met: [] Adjusted     Long Term Goals: To be achieved in: 12 weeks        . [] Progressing: [] Met: [] Not Met: [x] Adjusted  FOTO no longer appropriate, DC from use     2. Patient will demonstrate increased AROM to equal the opposite side bilaterally to allow for reaching into upper kitchen cabinets as indicated by patients Functional Deficits. [x] Progressing: [] Met: [] Not Met: [] Adjusted       3.  Patient will demonstrate an increase in strength to 4+/5 to allow for lifting paint buckets less than 5 lbs  as indicated by patients Functional Deficits. [] Progressing: [] Met: [] Not Met: [] Adjusted       4. 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 (Pt specific functional or activity goal)Pt will be able to sleep w/o sleep aids or waking from shoulder pain. [] Progressing: [] Met: [] Not Met: [] Adjusted  6. Pt will demonstrate pain decreased by 50%  (0-1/10) with all ADLS. [] Progressing: [] Met: [] Not Met: [] Adjusted          ASSESSMENT:     Pt demonstrated increased posterior shoulder tightness most likely from shoveling snow prior to visit. Fatigued but tolerated session well. Patient will benefit from continued skilled intervention to increase ROM, flexibility, strength and function. Overall Progression Towards Functional goals/ Treatment Progress Update:  [x] Patient is progressing as expected towards functional goals listed. [] Progression is slowed due to complexities/Impairments listed. [] Progression has been slowed due to co-morbidities. [] Plan just implemented, too soon to assess goals progression <30days   [] Goals require adjustment due to lack of progress  [] Patient is not progressing as expected and requires additional follow up with physician  [] Other    Prognosis for POC: [x] Good [] Fair  [] Poor      Patient requires continued skilled intervention: [x] Yes  [] No    Treatment/Activity Tolerance:  [x] Patient able to complete treatment  [] Patient limited by fatigue  [] Patient limited by pain    [] Patient limited by other medical complications  [] Other:     PLAN: Cont per protocol. Add therex slowly. Scapular emphasis. PN NV    [x] Continue per plan of care [] Alter current plan (see comments above)  [] Plan of care initiated [] Hold pending MD visit [] Discharge      Electronically signed by:  Latricia Hugo, PT, [id number]     Note: If patient does not return for scheduled/ recommended follow up visits, this note will serve as a discharge from care along with most recent update on progress.

## 2023-02-02 ENCOUNTER — HOSPITAL ENCOUNTER (OUTPATIENT)
Dept: PHYSICAL THERAPY | Age: 68
Setting detail: THERAPIES SERIES
Discharge: HOME | End: 2023-02-02
Payer: MEDICARE

## 2023-02-02 PROCEDURE — 97140 MANUAL THERAPY 1/> REGIONS: CPT | Performed by: PHYSICAL THERAPIST

## 2023-02-02 PROCEDURE — 97112 NEUROMUSCULAR REEDUCATION: CPT | Performed by: PHYSICAL THERAPIST

## 2023-02-02 PROCEDURE — 97110 THERAPEUTIC EXERCISES: CPT | Performed by: PHYSICAL THERAPIST

## 2023-02-02 NOTE — FLOWSHEET NOTE
[facility], [address]  Phone: [phone]  Fax [phone]  :  Physical Therapy Treatment Note/ Progress Report:           Date:  2023    Patient Name:  Joanna Christiansen    :  1955  MRN: [id number]    Referring Physician: Sabina Arcos MD/Pedro Angeles Done                                                    Evaluation Date: 2022                                         Date of Referral:22     Insurance: [facility] Medicare BMN no auth     Next MD appt: scheduled:  23     Medical Diagnosis:  A06.847S (ICD-10-CM) - Traumatic complete tear of right rotator cuff, initial encounter     Treatment Diagnosis:  R  massive RCR and bicep tenodesis DOSx 10/26/22  R shoulder pain M25.511  R shoulder stiffness M215.611        Precautions/ Contra-indications: see below        Has the plan of care been signed (Y/N):        [x]  Yes  []  No    Progress report will be due (10 Rx or 30 days ):   DUE visit 10 or 22 DONE visit 8 22  DUE visit 18         Recertification will be due (POC Duration  / 90 days ): 23     Is this a Progress Report:     []  Yes  [x]  No      If Yes:  Date Range for reporting period:  Beginnin23  Endin23              Visit # Date Range Insurance Allowable Requires auth   IN PERSON  9 22-22 BMN    [x]no        []yes:   2023 9 1/3/23-     TOTAL              CX/NS       Next PT appt:                 PT Practice Pattern:D  Co morbidities:1-2  Surgical:R RCR and bicep tenodesis massive repair  DOS 10/26/22    INITIAL VISIT 22 CURRENT VISIT  23   PAIN 0-10/10 210 110   FUNCTIONAL SCALE FOTO 40/100 75/100   PROM SFLX 30 170    SER  0 at 0 deg abd 50 ABD: 80    S IR: NT 50 ABD: 60         AROM SFLX     SSCAP     SER NT @ side 82    SIR NT Behind back L2   STRENGHT (MMT) FLX DNT 4/5    IR DNT 4+/5    ER DNT 4+/5 Latex Allergy/Pacemaker/Adhesive allergy:  [x]NO      []YES     RESTRICTIONS/PRECAUTIONS:           Weeks 11-14      Progress all exercises      Continue ROM and flexibility exercises      Stretch posterior capsule with cross body adduction stretching      Progress strengthening program (increase 0.5kg/10days if nonpainful)      No residual pain should be present following exercises     Criteria to advance to Phase III      Full AROM and PROM      Pain-free with all strengthening exercises      Dynamic shoulder stability        SUBJECTIVE:  Pt reports he feels good overall other than same day soreness from the past visit. Pain level:  1/10    OBJECTIVE:     Observation/palpation:       Pt has significantly poor positioning of B shoulders due to postural issues. Exercises/Interventions:     HEP:  [employer] (see scanned forms)  Access Code: [email or web address]  URL: [email or web address]  Date: 11/28/2022  Prepared by: Edwin Retana      SEE PREVIOUS VISIT NOTES FOR HEP    Therapeutic Ex (86594)  NMR re-education (82113) Reps/Sets/time Notes/CUES/Assessment HEP   Prone mid trap, rhomboid, ER 1#  15x ea     Manual assist        SLER 1#  3x12x3\"     Pulleys FLX/SCAP BUEs 10x10\" ea                                                                       Supine chest press and flex/press 2#  15x3\"     TB   IR  ER GTB  2x15x3\"  2x15x3\"                               Terry stretch 4 position FLX stretch  Terry stretch thoracic rotation stretch to R 2x10\" ea  10x5\"     MC    shrugs 5#  3x10x3\"     Bicep curls 2#  2x10x N/sup     TB wall scap retraction  OVL  10x ea fatigue    VL FLX walks OVL   10x                       reassessment x8'     Pt education x5' progression    Manual Intervention (39813)          PROM all directions  x8'                 Therapeutic Activity (67317)                  Therapeutic Exercise and NMR EXR  [x] (04566) Provided verbal/tactile cueing for activities related to strengthening, flexibility, endurance, ROM for improvements in LE, proximal hip, and core control with self care, mobility, lifting, ambulation. [x] (88504) Provided verbal/tactile cueing for activities related to improving balance, coordination, kinesthetic sense, posture, motor skill, proprioception  to assist with LE, proximal hip, and core control in self care, mobility, lifting, ambulation and eccentric single leg control. NMR and Therapeutic Activities:    [x] (26858 or 73025) Provided verbal/tactile cueing for activities related to improving balance, coordination, kinesthetic sense, posture, motor skill, proprioception and motor activation to allow for proper function of core, proximal hip and LE with self care and ADLs  [] (39869) Gait Re-education- Provided training and instruction to the patient for proper LE, core and proximal hip recruitment and positioning and eccentric body weight control with ambulation re-education including up and down stairs     Home Exercise Program:    [x] (28679) Reviewed/Progressed HEP activities related to strengthening, flexibility, endurance, ROM of core, proximal hip and LE for functional self-care, mobility, lifting and ambulation/stair navigation   [] (51510)Reviewed/Progressed HEP activities related to improving balance, coordination, kinesthetic sense, posture, motor skill, proprioception of core, proximal hip and LE for self care, mobility, lifting, and ambulation/stair navigation      Manual Treatments:  PROM / STM / Oscillations-Mobs:  G-I, II, III, IV (PA's, Inf., Post.)  [x] (81366) Provided manual therapy to mobilize LE, proximal hip and/or LS spine soft tissue/joints for the purpose of modulating pain, promoting relaxation,  increasing ROM, reducing/eliminating soft tissue swelling/inflammation/restriction, improving soft tissue extensibility and allowing for proper ROM for normal function with self care, mobility, lifting and ambulation.      Trigger point Dry Needling:  fine needle insertion into myofascial trigger points to stimulate a healing response  [] (73139) Needle insertion without injection: 1 or 2 muscles  [] (80983) Needle insertion without injection: 3 or more muscles    Modalities:  CP x10' post session B shoulders   [] GAME READY (VASO)- for significant edema, swelling, pain control. [] ESU (HV/PM) for edema and pain control      Charges:   Timed Code Treatment Minutes: 60   Total Treatment Minutes: 60       [] EVAL (LOW) 77452 (typically 20 minutes face-to-face)  [] EVAL (MOD) 25883 (typically 30 minutes face-to-face)  [] EVAL (HIGH) 82213 (typically 45 minutes face-to-face)  [] RE-EVAL     [x] LT(46957) x 2  [] IONTO  [x] NMR (25572) x  1  [] VASO  [x] Manual (29167) x 1     [] Other:  [] TA x      [] Mech Traction (72050)  [] ES(attended) (94796)      [] ES (un) (56447):             GOALS:    Patient stated goal: to get mobility back  [x] Progressing: [] Met: [] Not Met: [] Adjusted     Therapist goals for Patient:   Short Term Goals: To be achieved in: 2 weeks    2/2 MET    1. Independent in HEP and progression per patient tolerance, in order to prevent re-injury. [] Progressing: [x] Met: [] Not Met: [] Adjusted  2. Patient will have a decrease in pain to facilitate improvement in movement, function, and ADLs as indicated by Functional Deficits. [] Progressing: [x] Met: [] Not Met: [] Adjusted     Long Term Goals: To be achieved in: 12 weeks     2/6 MET     1. FOTO score to be equal or greater to the predicted score (65/100)  to assist with reaching prior level of function. [] Progressing: [x] Met: [] Not Met: [] Adjusted    2. Patient will demonstrate increased AROM to equal the opposite side bilaterally to allow for reaching into upper kitchen cabinets as indicated by patients Functional Deficits. [x] Progressing: [] Met: [] Not Met: [] Adjusted       3.  Patient will demonstrate an increase in strength to 4+/5 to allow for lifting paint buckets less than 5 lbs  as indicated by patients Functional Deficits. [x] Progressing: [] Met: [] Not Met: [] Adjusted       4. 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 (Pt specific functional or activity goal)Pt will be able to sleep w/o sleep aids or waking from shoulder pain. [] Progressing: [x] Met: [] Not Met: [] Adjusted  6. Pt will demonstrate pain decreased by 50%  (0-1/10) with all ADLS. [x] Progressing: [] Met: [] Not Met: [] Adjusted          ASSESSMENT:     Improved ROM and GH joint mobility this visit. Pt demonstrates imporved scapualr control and less fatigue w therex. Patient will benefit from continued skilled intervention to increase ROM, flexibility, strength and function. Overall Progression Towards Functional goals/ Treatment Progress Update:  [x] Patient is progressing as expected towards functional goals listed. [] Progression is slowed due to complexities/Impairments listed. [] Progression has been slowed due to co-morbidities.   [] Plan just implemented, too soon to assess goals progression <30days   [] Goals require adjustment due to lack of progress  [] Patient is not progressing as expected and requires additional follow up with physician  [] Other    Prognosis for POC: [x] Good [] Fair  [] Poor      Patient requires continued skilled intervention: [x] Yes  [] No    Treatment/Activity Tolerance:  [x] Patient able to complete treatment  [] Patient limited by fatigue  [] Patient limited by pain    [] Patient limited by other medical complications  [] Other:     PLAN:     [x] Continue per plan of care [] Alter current plan (see comments above)  [] Plan of care initiated [] Hold pending MD visit [] Discharge      Electronically signed by:  Deana Cooley, [facility], [id number]     Note: If patient does not return for scheduled/ recommended follow up visits, this note will serve as a discharge from care along with most recent update on progress.

## 2023-02-07 ENCOUNTER — HOSPITAL ENCOUNTER (OUTPATIENT)
Dept: PHYSICAL THERAPY | Age: 68
Setting detail: THERAPIES SERIES
Discharge: HOME | End: 2023-02-07
Payer: MEDICARE

## 2023-02-07 PROCEDURE — 97112 NEUROMUSCULAR REEDUCATION: CPT | Performed by: PHYSICAL THERAPIST

## 2023-02-07 PROCEDURE — 97110 THERAPEUTIC EXERCISES: CPT | Performed by: PHYSICAL THERAPIST

## 2023-02-07 NOTE — FLOWSHEET NOTE
[facility] [facility], [address]  Phone: [phone]  Fax [phone]  :  Physical Therapy Treatment Note/ Progress Report:           Date:  2023    Patient Name:  Bessie Sampson    :  1955  MRN: [id number]    Referring Physician: Bertha Rivera MD/Pedro Hernandez                                                    Evaluation Date: 2022                                         Date of Referral:22     Insurance: [facility] Medicare BMN no auth     Next MD appt: scheduled:  23     Medical Diagnosis:  F90.693Y (ICD-10-CM) - Traumatic complete tear of right rotator cuff, initial encounter     Treatment Diagnosis:  R  massive RCR and bicep tenodesis DOSx 10/26/22  R shoulder pain M25.511  R shoulder stiffness M215.611        Precautions/ Contra-indications: see below        Has the plan of care been signed (Y/N):        [x]  Yes  []  No    Progress report will be due (10 Rx or 30 days ):   DUE visit 10 or 22 DONE visit 8 22  DUE visit 18         Recertification will be due (POC Duration  / 90 days ): 23     Is this a Progress Report:     []  Yes  [x]  No      If Yes:  Date Range for reporting period:  Beginnin23  Ending:               Visit # Date Range Insurance Allowable Requires auth   IN PERSON 2022 10 11/28/22-22 BMN    [x]no        []yes:   2023 10 1/3/23-     TOTAL              CX/NS       Next PT appt:                 PT Practice Pattern:D  Co morbidities:1-2  Surgical:R RCR and bicep tenodesis massive repair  DOS 10/26/22    INITIAL VISIT 22 CURRENT VISIT  23   PAIN 0-10/10 2/10 1/10   FUNCTIONAL SCALE FOTO 40/100 75/100   PROM SFLX 30 170    SER  0 at 0 deg abd 50 ABD: 80    S IR: NT 50 ABD: 60         AROM SFLX     SSCAP     SER NT @ side 82    SIR NT Behind back L2   STRENGHT (MMT) FLX DNT 4/5    IR DNT 4+/5    ER DNT 4+/5 Latex Allergy/Pacemaker/Adhesive allergy:  [x]NO      []YES     RESTRICTIONS/PRECAUTIONS:           Weeks 11-14      Progress all exercises      Continue ROM and flexibility exercises      Stretch posterior capsule with cross body adduction stretching      Progress strengthening program (increase 0.5kg/10days if nonpainful)      No residual pain should be present following exercises     Criteria to advance to Phase III      Full AROM and PROM      Pain-free with all strengthening exercises      Dynamic shoulder stability        SUBJECTIVE:  Pt reports he feels good overall and almost feels normal at this pint. He mostly has issues with overhead and reaching across body. He has issues brushing the L side of his hair. Pain level:  1/10    OBJECTIVE:     Observation/palpation: tightness in R post cuff. Pt has significantly poor positioning of B shoulders due to postural issues. R>L. Exercises/Interventions:     HEP:  [employer]bridge (see scanned forms)  Access Code: [email or web address]  URL: [email or web address]  Date: 11/28/2022  Prepared by: Roberto Coffman      SEE PREVIOUS VISIT NOTES FOR HEP    Therapeutic Ex (56377)  NMR re-education (68040) Reps/Sets/time Notes/CUES/Assessment HEP                        Pulleys warm up FLX 4'     TB HABD GTB  2x10  X 2/7 no TB   TB HADD RTB  2x10  X 2/7 no TB   TB D1 FLX RTB  5x Stopped due to pain    Supine D2 EXT hold     \"Bowling \" FLX and opposite side of head  MWM 10x ea  X 2/7   HADD MWM 10x pain    Wall wipes 10/12/2:00 10x RUE  X 2/7   Supine protraction 3#  2x10x3\"                                             Terry stretch 4 position FLX stretch  Terry stretch thoracic rotation stretch to R 2x10\" ea  10x5\"     MC    shrugs 5#  3x10x3\"     Bicep curls 2#  2x10x N/sup     TB wall scap retraction  OVL  10x ea fatigue    VL FLX walks OVL   10x                       reassessment x8'     Pt education x5' progression    Manual Intervention ([address] [place] [address])          PROM all directions  x8'                 Therapeutic Activity (95313)                  Therapeutic Exercise and NMR EXR  [x] (46714) Provided verbal/tactile cueing for activities related to strengthening, flexibility, endurance, ROM for improvements in LE, proximal hip, and core control with self care, mobility, lifting, ambulation. [x] (08124) Provided verbal/tactile cueing for activities related to improving balance, coordination, kinesthetic sense, posture, motor skill, proprioception  to assist with LE, proximal hip, and core control in self care, mobility, lifting, ambulation and eccentric single leg control.      NMR and Therapeutic Activities:    [x] (88728 or 58425) Provided verbal/tactile cueing for activities related to improving balance, coordination, kinesthetic sense, posture, motor skill, proprioception and motor activation to allow for proper function of core, proximal hip and LE with self care and ADLs  [] (19340) Gait Re-education- Provided training and instruction to the patient for proper LE, core and proximal hip recruitment and positioning and eccentric body weight control with ambulation re-education including up and down stairs     Home Exercise Program:    [x] (71845) Reviewed/Progressed HEP activities related to strengthening, flexibility, endurance, ROM of core, proximal hip and LE for functional self-care, mobility, lifting and ambulation/stair navigation   [] (46124)Reviewed/Progressed HEP activities related to improving balance, coordination, kinesthetic sense, posture, motor skill, proprioception of core, proximal hip and LE for self care, mobility, lifting, and ambulation/stair navigation      Manual Treatments:  PROM / STM / Oscillations-Mobs:  G-I, II, III, IV (PA's, Inf., Post.)  [x] (84431) Provided manual therapy to mobilize LE, proximal hip and/or LS spine soft tissue/joints for the purpose of modulating pain, promoting relaxation,  increasing ROM, reducing/eliminating soft tissue swelling/inflammation/restriction, improving soft tissue extensibility and allowing for proper ROM for normal function with self care, mobility, lifting and ambulation. Trigger point Dry Needling:  fine needle insertion into myofascial trigger points to stimulate a healing response  [] (53058) Needle insertion without injection: 1 or 2 muscles  [] (97474) Needle insertion without injection: 3 or more muscles    Modalities:  CP x10' post session R shoulders   [] GAME READY (VASO)- for significant edema, swelling, pain control. [] ESU (HV/PM) for edema and pain control      Charges:   Timed Code Treatment Minutes: 45   Total Treatment Minutes: 60       [] EVAL (LOW) 06383 (typically 20 minutes face-to-face)  [] EVAL (MOD) 25548 (typically 30 minutes face-to-face)  [] EVAL (HIGH) 58886 (typically 45 minutes face-to-face)  [] RE-EVAL     [x] JH(69512) x 2  [] IONTO  [x] NMR (15194) x  1  [] VASO  [] Manual (53877) x      [] Other:  [] TA x      [] Mech Traction (27003)  [] ES(attended) (88621)      [] ES (un) (81903):             GOALS:    Patient stated goal: to get mobility back  [x] Progressing: [] Met: [] Not Met: [] Adjusted     Therapist goals for Patient:   Short Term Goals: To be achieved in: 2 weeks    2/2 MET    1. Independent in HEP and progression per patient tolerance, in order to prevent re-injury. [] Progressing: [x] Met: [] Not Met: [] Adjusted  2. Patient will have a decrease in pain to facilitate improvement in movement, function, and ADLs as indicated by Functional Deficits. [] Progressing: [x] Met: [] Not Met: [] Adjusted     Long Term Goals: To be achieved in: 12 weeks     2/6 MET     1. FOTO score to be equal or greater to the predicted score (65/100)  to assist with reaching prior level of function. [] Progressing: [x] Met: [] Not Met: [] Adjusted    2.  Patient will demonstrate increased AROM to equal the opposite side bilaterally to allow for reaching into upper kitchen cabinets as indicated by patients Functional Deficits. [x] Progressing: [] Met: [] Not Met: [] Adjusted       3. Patient will demonstrate an increase in strength to 4+/5 to allow for lifting paint buckets less than 5 lbs  as indicated by patients Functional Deficits. [x] Progressing: [] Met: [] Not Met: [] Adjusted       4. 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 (Pt specific functional or activity goal)Pt will be able to sleep w/o sleep aids or waking from shoulder pain. [] Progressing: [x] Met: [] Not Met: [] Adjusted  6. Pt will demonstrate pain decreased by 50%  (0-1/10) with all ADLS. [x] Progressing: [] Met: [] Not Met: [] Adjusted          ASSESSMENT:     Soreness and fatigue following session. Pt will be seen 2 further visits to focus on deficits then [facility] to HEP. Patient will benefit from continued skilled intervention to increase ROM, flexibility, strength and function. Overall Progression Towards Functional goals/ Treatment Progress Update:  [x] Patient is progressing as expected towards functional goals listed. [] Progression is slowed due to complexities/Impairments listed. [] Progression has been slowed due to co-morbidities. [] Plan just implemented, too soon to assess goals progression <30days   [] Goals require adjustment due to lack of progress  [] Patient is not progressing as expected and requires additional follow up with physician  [] Other    Prognosis for POC: [x] Good [] Fair  [] Poor      Patient requires continued skilled intervention: [x] Yes  [] No    Treatment/Activity Tolerance:  [x] Patient able to complete treatment  [] Patient limited by fatigue  [] Patient limited by pain    [] Patient limited by other medical complications  [] Other:     PLAN: Reassess NV. Advance HEP.     [x] Continue per plan of care [] Alter current plan (see comments above)  [] Plan of care initiated [] Hold pending MD visit [] Discharge      Electronically signed by:  Coral Deluna, PT, [id number]     Note: If patient does not return for scheduled/ recommended follow up visits, this note will serve as a discharge from care along with most recent update on progress.

## 2023-02-09 ENCOUNTER — HOSPITAL ENCOUNTER (OUTPATIENT)
Dept: PHYSICAL THERAPY | Age: 68
Setting detail: THERAPIES SERIES
Discharge: HOME | End: 2023-02-09
Payer: MEDICARE

## 2023-02-14 ENCOUNTER — HOSPITAL ENCOUNTER (OUTPATIENT)
Dept: PHYSICAL THERAPY | Age: 68
Setting detail: THERAPIES SERIES
Discharge: HOME | End: 2023-02-14
Payer: MEDICARE

## 2023-02-14 PROCEDURE — 97110 THERAPEUTIC EXERCISES: CPT | Performed by: PHYSICAL THERAPIST

## 2023-02-14 PROCEDURE — 97112 NEUROMUSCULAR REEDUCATION: CPT | Performed by: PHYSICAL THERAPIST

## 2023-02-14 NOTE — FLOWSHEET NOTE
[facility], [address]  Phone: [phone]  Fax [phone]  :  Physical Therapy Treatment Note/ Progress Report:           Date:  2023    Patient Name:  Shannan Campos    :  1955  MRN: [id number]    Referring Physician: Valentino Guthrie, MD/Pedro Lord                                                    Evaluation Date: 2022                                         Date of Referral:22     Insurance: [facility] Medicare BMN no auth     Next MD appt: scheduled:  23     Medical Diagnosis:  S46.011A (ICD-10-CM) - Traumatic complete tear of right rotator cuff, initial encounter     Treatment Diagnosis:  R  massive RCR and bicep tenodesis DOSx 10/26/22  R shoulder pain M25.511  R shoulder stiffness M215.611        Precautions/ Contra-indications: see below        Has the plan of care been signed (Y/N):        [x]  Yes  []  No    Progress report will be due (10 Rx or 30 days ):   DUE visit 10 or 22 DONE visit 8 22  DUE visit 18         Recertification will be due (POC Duration  / 90 days ): 23     Is this a Progress Report:     []  Yes  [x]  No      If Yes:  Date Range for reporting period:  Beginnin23  Ending:               Visit # Date Range Insurance Allowable Requires auth   IN PERSON 2022 10 11/28/22-22 BMN    [x]no        []yes:   2023 11 1/3/23-     TOTAL              CX/NS       Next PT appt:                 PT Practice Pattern:D  Co morbidities:1-2  Surgical:R RCR and bicep tenodesis massive repair  DOS 10/26/22    INITIAL VISIT 22 CURRENT VISIT  23   PAIN 0-10/10 2/10 1/10   FUNCTIONAL SCALE FOTO 40/100 75/100   PROM SFLX 30 170    SER  0 at 0 deg abd 50 ABD: 80    S IR: NT 50 ABD: 60         AROM SFLX     SSCAP     SER NT @ side 82    SIR NT Behind back L2   STRENGHT (MMT) FLX DNT 4/5    IR DNT 4+/5    ER DNT 4+/5 Latex Allergy/Pacemaker/Adhesive allergy:  [x]NO      []YES     RESTRICTIONS/PRECAUTIONS:           Weeks 11-14      Progress all exercises      Continue ROM and flexibility exercises      Stretch posterior capsule with cross body adduction stretching      Progress strengthening program (increase 0.5kg/10days if nonpainful)      No residual pain should be present following exercises     Criteria to advance to Phase III      Full AROM and PROM      Pain-free with all strengthening exercises      Dynamic shoulder stability        SUBJECTIVE:  Pt feels good about his shoulder progress, he feels like he would be ready for discharge after reviewing questions and HEP this visit. Pain level:  1/10    OBJECTIVE:       Pt has significantly poor positioning of B shoulders due to postural issues. R>L. Exercises/Interventions:     HEP:  [facility]   Access Code: [email or web address]  URL: PhishMe/  Date: 11/28/2022  Prepared by: Quinn Santos updated HEP 2/14/23  Access Code: [email or web address]  URL: [employer]ishMe/  Date: 02/14/2023  Prepared by: Ursula Quant      Therapeutic Ex (89082)  NMR re-education (69675) Reps/Sets/time Notes/CUES/Assessment HEP         Prone ext w scap set   2x15x3\" X                Pulleys warm up FLX 4'     TB HABD GTB  2x10  X 2/7 no TB   TB HADD RTB  2x10  X 2/7 no TB   TB D1 FLX RTB  5x Stopped due to pain    Supine D2 EXT hold     \"Bowling \" FLX and opposite side of head  MWM 10x ea  X 2/7   HADD MWM 10x pain    Wall wipes 10/12/2:00 10x RUE Cannot do at home due to wall space issues.     Supine protraction 2#  2x15x3\"  X    Prone HAB w scap set 2x10x3\"  X                                      Terry stretch 3 position pec stretch   2x15\" ea  R/L posteriror LE  X    MC    shrugs 5#  3x10x3\"     Bicep curls 2#  2x10x N/sup     TB wall scap retraction  OVL  2x10x ea fatigue X    VL FLX walks OVL   2x10x  X    SL ER 1#  2x10x3\"  X    Doorway flexion  10x3\" X    Doorway pec stretch 3 pos 2x30\" ea  X    reassessment x8'     Pt education x5' progression    Manual Intervention (71799)                          Therapeutic Activity (99821)                  Therapeutic Exercise and NMR EXR  [x] (75199) Provided verbal/tactile cueing for activities related to strengthening, flexibility, endurance, ROM for improvements in LE, proximal hip, and core control with self care, mobility, lifting, ambulation. [x] (72151) Provided verbal/tactile cueing for activities related to improving balance, coordination, kinesthetic sense, posture, motor skill, proprioception  to assist with LE, proximal hip, and core control in self care, mobility, lifting, ambulation and eccentric single leg control.      NMR and Therapeutic Activities:    [x] (66078 or 30327) Provided verbal/tactile cueing for activities related to improving balance, coordination, kinesthetic sense, posture, motor skill, proprioception and motor activation to allow for proper function of core, proximal hip and LE with self care and ADLs  [] (49189) Gait Re-education- Provided training and instruction to the patient for proper LE, core and proximal hip recruitment and positioning and eccentric body weight control with ambulation re-education including up and down stairs     Home Exercise Program:    [x] (57292) Reviewed/Progressed HEP activities related to strengthening, flexibility, endurance, ROM of core, proximal hip and LE for functional self-care, mobility, lifting and ambulation/stair navigation   [] (30934)Reviewed/Progressed HEP activities related to improving balance, coordination, kinesthetic sense, posture, motor skill, proprioception of core, proximal hip and LE for self care, mobility, lifting, and ambulation/stair navigation      Manual Treatments:  PROM / STM / Oscillations-Mobs:  G-I, II, III, IV (PA's, Inf., Post.)  [x] (01281) Provided manual therapy to mobilize LE, proximal hip and/or LS spine soft tissue/joints for the purpose of modulating pain, promoting relaxation,  increasing ROM, reducing/eliminating soft tissue swelling/inflammation/restriction, improving soft tissue extensibility and allowing for proper ROM for normal function with self care, mobility, lifting and ambulation. Trigger point Dry Needling:  fine needle insertion into myofascial trigger points to stimulate a healing response  [] (09456) Needle insertion without injection: 1 or 2 muscles  [] (18551) Needle insertion without injection: 3 or more muscles    Modalities:  declined   [] GAME READY (VASO)- for significant edema, swelling, pain control. [] ESU (HV/PM) for edema and pain control      Charges:   Timed Code Treatment Minutes: 45   Total Treatment Minutes: 45       [] EVAL (LOW) 21981 (typically 20 minutes face-to-face)  [] EVAL (MOD) 28185 (typically 30 minutes face-to-face)  [] EVAL (HIGH) 58429 (typically 45 minutes face-to-face)  [] RE-EVAL     [x] YX(76658) x 2  [] IONTO  [x] NMR (30599) x  1  [] VASO  [] Manual (08919) x      [] Other:  [] TA x      [] Mech Traction (50619)  [] ES(attended) (50738)      [] ES (un) (81543):             GOALS:    Patient stated goal: to get mobility back  [x] Progressing: [] Met: [] Not Met: [] Adjusted     Therapist goals for Patient:   Short Term Goals: To be achieved in: 2 weeks    2/2 MET    1. Independent in HEP and progression per patient tolerance, in order to prevent re-injury. [] Progressing: [x] Met: [] Not Met: [] Adjusted  2. Patient will have a decrease in pain to facilitate improvement in movement, function, and ADLs as indicated by Functional Deficits. [] Progressing: [x] Met: [] Not Met: [] Adjusted     Long Term Goals: To be achieved in: 12 weeks     2/6 MET     1. FOTO score to be equal or greater to the predicted score (65/100)  to assist with reaching prior level of function. [] Progressing: [x] Met: [] Not Met: [] Adjusted    2.  Patient will demonstrate increased AROM to equal the opposite side bilaterally to allow for reaching into upper kitchen cabinets as indicated by patients Functional Deficits. [x] Progressing: [] Met: [] Not Met: [] Adjusted       3. Patient will demonstrate an increase in strength to 4+/5 to allow for lifting paint buckets less than 5 lbs  as indicated by patients Functional Deficits. [x] Progressing: [] Met: [] Not Met: [] Adjusted       4. 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 (Pt specific functional or activity goal)Pt will be able to sleep w/o sleep aids or waking from shoulder pain. [] Progressing: [x] Met: [] Not Met: [] Adjusted  6. Pt will demonstrate pain decreased by 50%  (0-1/10) with all ADLS. [x] Progressing: [] Met: [] Not Met: [] Adjusted          ASSESSMENT:     DC to HEP NV. Patient will benefit from continued skilled intervention to increase ROM, flexibility, strength and function. Overall Progression Towards Functional goals/ Treatment Progress Update:  [x] Patient is progressing as expected towards functional goals listed. [] Progression is slowed due to complexities/Impairments listed. [] Progression has been slowed due to co-morbidities. [] Plan just implemented, too soon to assess goals progression <30days   [] Goals require adjustment due to lack of progress  [] Patient is not progressing as expected and requires additional follow up with physician  [] Other    Prognosis for POC: [x] Good [] Fair  [] Poor      Patient requires continued skilled intervention: [x] Yes  [] No    Treatment/Activity Tolerance:  [x] Patient able to complete treatment  [] Patient limited by fatigue  [] Patient limited by pain    [] Patient limited by other medical complications  [] Other:     PLAN: DC to HEP NV after reassessment.     [x] Continue per plan of care [] Alter current plan (see comments above)  [] Plan of care initiated [] Hold pending MD visit [] Discharge      Electronically signed by:  Damien Atkinson, PT, [id number]     Note: If patient does not return for scheduled/ recommended follow up visits, this note will serve as a discharge from care along with most recent update on progress.

## 2023-02-21 ENCOUNTER — HOSPITAL ENCOUNTER (OUTPATIENT)
Dept: PHYSICAL THERAPY | Age: 68
Setting detail: THERAPIES SERIES
Discharge: HOME | End: 2023-02-21
Payer: MEDICARE

## 2023-02-21 PROCEDURE — 97110 THERAPEUTIC EXERCISES: CPT | Performed by: PHYSICAL THERAPIST

## 2023-02-21 NOTE — FLOWSHEET NOTE
[facility], [address]  Phone: [phone]  Fax [phone]  :  Physical Therapy Treatment Note/ Progress Report:           Date:  2023    Patient Name:  Rakesh Mccoy    :  1955  MRN: [id number]    Referring Physician: Tati Frausto MD/Pedro Albert                                                    Evaluation Date: 2022                                         Date of Referral:22     Insurance: [facility] Medicare BMN no auth     Next MD appt: scheduled:  23     Medical Diagnosis:  A23.248C (ICD-10-CM) - Traumatic complete tear of right rotator cuff, initial encounter     Treatment Diagnosis:  R  massive RCR and bicep tenodesis DOSx 10/26/22  R shoulder pain M25.511  R shoulder stiffness M215.611        Precautions/ Contra-indications: see below        Has the plan of care been signed (Y/N):        [x]  Yes  []  No    Progress report will be due (10 Rx or 30 days ):   DUE visit 10 or 22 DONE visit 8 22  DUE visit 18         Recertification will be due (POC Duration  / 90 days ): 23     Is this a Progress Report:     []  Yes  [x]  No      If Yes:  Date Range for reporting period:  Beginnin23  Endin23              Visit # Date Range Insurance Allowable Requires auth   IN PERSON 2022 10 11/28/22-22 BMN    [x]no        []yes:   2023 12 1/3/23-23     TOTAL 22             CX/NS       Next PT appt:                 PT Practice Pattern:D  Co morbidities:1-2  Surgical:R RCR and bicep tenodesis massive repair  DOS 10/26/22    INITIAL VISIT 22 CURRENT VISIT  23   PAIN 0-10/10 210 1/10   FUNCTIONAL SCALE FOTO 40/100 75/100   PROM SFLX 30 170    SER  0 at 0 deg abd 50 ABD: 80    S IR: NT 50 ABD: 60         AROM SFLX     SSCAP     SER NT @ side 82    SIR NT Behind back T10   STRENGHT (MMT) FLX DNT 4+/5    IR DNT 5/5    ER DNT 5/5    SCAP  4+/5                         Latex Allergy/Pacemaker/Adhesive allergy:  [x]NO      []YES     RESTRICTIONS/PRECAUTIONS:           Weeks 11-14      Progress all exercises      Continue ROM and flexibility exercises      Stretch posterior capsule with cross body adduction stretching      Progress strengthening program (increase 0.5kg/10days if nonpainful)      No residual pain should be present following exercises     Criteria to advance to Phase III      Full AROM and PROM      Pain-free with all strengthening exercises      Dynamic shoulder stability        SUBJECTIVE:  Pt reports no pain. He moved a metal cabinet outside and built some steps wit no issues during or after. Pain level:  0-1/10    OBJECTIVE:       Pt has significantly poor positioning of B shoulders due to chronic postural issues. R>L. Exercises/Interventions:     HEP:  [facility]   Access Code: [email or web address]  URL: Andre Phillipe/  Date: 11/28/2022  Prepared by: Manjeet Ya updated HEP 2/14/23  Access Code: [email or web address]  URL: [employer] Phillipe/  Date: 02/14/2023  Prepared by: Chalmer Leyden      Therapeutic Ex (19874)  NMR re-education (68560) Reps/Sets/time Notes/CUES/Assessment HEP      Finisher outward circles and cross corners BUE 3\"  10x ea    IR strap stretch 5x10\"    AROM SFLX/SABD to 90 10x ea    pulleys 5'                                                                                                    Terry stretch 3 position pec stretch   2x15\" ea  R/L posteriror LE  X                                                    reassessment x5'     Pt education x5' Progression after PT DC, shoulder safety    Manual Intervention (81176)                              Therapeutic Activity (86374)                  Therapeutic Exercise and NMR EXR  [x] (44105) Provided verbal/tactile cueing for activities related to strengthening, flexibility, endurance, ROM for improvements in LE, proximal hip, and core control with self care, mobility, lifting, ambulation.  [] (06875) Provided verbal/tactile cueing for activities related to improving balance, coordination, kinesthetic sense, posture, motor skill, proprioception  to assist with LE, proximal hip, and core control in self care, mobility, lifting, ambulation and eccentric single leg control. NMR and Therapeutic Activities:    [] (90645 or 84910) Provided verbal/tactile cueing for activities related to improving balance, coordination, kinesthetic sense, posture, motor skill, proprioception and motor activation to allow for proper function of core, proximal hip and LE with self care and ADLs  [] (17283) Gait Re-education- Provided training and instruction to the patient for proper LE, core and proximal hip recruitment and positioning and eccentric body weight control with ambulation re-education including up and down stairs     Home Exercise Program:    [x] (47132) Reviewed/Progressed HEP activities related to strengthening, flexibility, endurance, ROM of core, proximal hip and LE for functional self-care, mobility, lifting and ambulation/stair navigation   [] (22019)Reviewed/Progressed HEP activities related to improving balance, coordination, kinesthetic sense, posture, motor skill, proprioception of core, proximal hip and LE for self care, mobility, lifting, and ambulation/stair navigation      Manual Treatments:  PROM / STM / Oscillations-Mobs:  G-I, II, III, IV (PA's, Inf., Post.)  [] (31642) Provided manual therapy to mobilize LE, proximal hip and/or LS spine soft tissue/joints for the purpose of modulating pain, promoting relaxation,  increasing ROM, reducing/eliminating soft tissue swelling/inflammation/restriction, improving soft tissue extensibility and allowing for proper ROM for normal function with self care, mobility, lifting and ambulation.      Trigger point Dry Needling:  fine needle insertion into myofascial trigger points to stimulate a healing response  [] (24330) Needle insertion without injection: 1 or 2 muscles  [] (70524) Needle insertion without injection: 3 or more muscles    Modalities:  declined   [] GAME READY (VASO)- for significant edema, swelling, pain control. [] ESU (HV/PM) for edema and pain control      Charges:   Timed Code Treatment Minutes: 30   Total Treatment Minutes: 30       [] EVAL (LOW) 47454 (typically 20 minutes face-to-face)  [] EVAL (MOD) 21373 (typically 30 minutes face-to-face)  [] EVAL (HIGH) 91998 (typically 45 minutes face-to-face)  [] RE-EVAL     [x] (20938) x 2  [] IONTO  [] NMR (79151) x    [] VASO  [] Manual (29148) x      [] Other:  [] TA x      [] Mech Traction (67136)  [] ES(attended) (65079)      [] ES (un) (82492):             GOALS:    Patient stated goal: to get mobility back  [x] Progressing: [] Met: [] Not Met: [] Adjusted     Therapist goals for Patient:   Short Term Goals: To be achieved in: 2 weeks    2/2 MET    1. Independent in HEP and progression per patient tolerance, in order to prevent re-injury. [] Progressing: [x] Met: [] Not Met: [] Adjusted  2. Patient will have a decrease in pain to facilitate improvement in movement, function, and ADLs as indicated by Functional Deficits. [] Progressing: [x] Met: [] Not Met: [] Adjusted     Long Term Goals: To be achieved in: 12 weeks     6/6 MET     1. FOTO score to be equal or greater to the predicted score (65/100)  to assist with reaching prior level of function. [] Progressing: [x] Met: [] Not Met: [] Adjusted    2. Patient will demonstrate increased AROM to equal the opposite side bilaterally to allow for reaching into upper kitchen cabinets as indicated by patients Functional Deficits. [] Progressing: [x] Met: [] Not Met: [] Adjusted       3. Patient will demonstrate an increase in strength to 4+/5 to allow for lifting paint buckets less than 5 lbs  as indicated by patients Functional Deficits.    [] Progressing: [x] Met: [] Not Met: [] Adjusted       4. Patient will return to all transfers, work activities, and functional activities without increased symptoms or restriction, specifically stripping paint off siding   [] Progressing: [x] Met: [] Not Met: [] Adjusted       5. (Pt specific functional or activity goal) Pt will be able to sleep w/o sleep aids or waking from shoulder pain. [] Progressing: [x] Met: [] Not Met: [] Adjusted  6. Pt will demonstrate pain decreased by 50%  (0-1/10) with all ADLS. [] Progressing: [x] Met: [] Not Met: [] Adjusted          ASSESSMENT:     Pt reports he is careful with using his arm for more physical activities. All goals met. Pt was given my contact info shoulder questions arise. Overall Progression Towards Functional goals/ Treatment Progress Update:  [x] Patient is progressing as expected towards functional goals listed. [] Progression is slowed due to complexities/Impairments listed. [] Progression has been slowed due to co-morbidities. [] Plan just implemented, too soon to assess goals progression <30days   [] Goals require adjustment due to lack of progress  [] Patient is not progressing as expected and requires additional follow up with physician  [x] Other All goals met    Prognosis for POC: [x] Good [] Fair  [] Poor      Patient requires continued skilled intervention: [x] Yes  [] No    Treatment/Activity Tolerance:  [x] Patient able to complete treatment  [] Patient limited by fatigue  [] Patient limited by pain    [] Patient limited by other medical complications  [] Other:     PLAN: DC to HEP     [] Continue per plan of care [] Alter current plan (see comments above)  [] Plan of care initiated [] Hold pending MD visit [x] Discharge      Electronically signed by:  Verner Brazil, PT, [id number]     Note: If patient does not return for scheduled/ recommended follow up visits, this note will serve as a discharge from care along with most recent update on progress.

## 2023-02-21 NOTE — DISCHARGE SUMMARY
[facility], [address]  Phone: [phone]  Fax [phone]  :  Physical Therapy Discharge Report:           Date:  2023    Patient Name:  Eduin Ca    :  1955  MRN: [id number]    Referring Physician: Basim Almaraz MD/Pedro Lewis                                                    Evaluation Date: 2022                                         Date of Referral:22     Insurance: [facility] Medicare BMN no auth     Next MD appt: scheduled:  23     Medical Diagnosis:  S46.011A (ICD-10-CM) - Traumatic complete tear of right rotator cuff, initial encounter     Treatment Diagnosis:  R  massive RCR and bicep tenodesis DOSx 10/26/22  R shoulder pain M25.511  R shoulder stiffness M215.611        Precautions/ Contra-indications: see below        Has the plan of care been signed (Y/N):        [x]  Yes  []  No    Progress report will be due (10 Rx or 30 days ):   DUE visit 10 or 22 DONE visit 8 22  DUE visit 18         Recertification will be due (POC Duration  / 90 days ): 23     Is this a Progress Report:     []  Yes  [x]  No      If Yes:  Date Range for reporting period:  Beginnin23  Endin23              Visit # Date Range Insurance Allowable Requires auth   IN PERSON 2022 10 11/28/22-22 BMN    [x]no        []yes:   2023 12 1/3/23-23     TOTAL 22             CX/NS       Next PT appt:                 PT Practice Pattern:D  Co morbidities:1-2  Surgical:R RCR and bicep tenodesis massive repair  DOS 10/26/22    INITIAL VISIT 22 CURRENT VISIT  23   PAIN 0-10/10 2/10 1/10   FUNCTIONAL SCALE FOTO 40/100 75/100   PROM SFLX 30 170    SER  0 at 0 deg abd 50 ABD: 80    S IR: NT 50 ABD: 60         AROM SFLX     SSCAP     SER NT @ side 82    SIR NT Behind back T10   STRENGHT (MMT) FLX DNT 4+/5    IR DNT 5/5    ER DNT 5 SCAP  4+/5                         Latex Allergy/Pacemaker/Adhesive allergy:  [x]NO      []YES     RESTRICTIONS/PRECAUTIONS:           Weeks 11-14      Progress all exercises      Continue ROM and flexibility exercises      Stretch posterior capsule with cross body adduction stretching      Progress strengthening program (increase 0.5kg/10days if nonpainful)      No residual pain should be present following exercises     Criteria to advance to Phase III      Full AROM and PROM      Pain-free with all strengthening exercises      Dynamic shoulder stability        SUBJECTIVE:  Pt reports no pain. He moved a metal cabinet outside and built some steps wit no issues during or after. Pain level:  0-1/10    OBJECTIVE:       Pt has significantly poor positioning of B shoulders due to chronic postural issues. R>L. Exercises/Interventions:     HEP:  [facility]   Access Code: [email or web address]  URL: Georgina Goodman/  Date: 11/28/2022  Prepared by: Eileen Lynch updated HEP 2/14/23  Access Code: [email or web address]  URL: Georgina Goodman/  Date: 02/14/2023  Prepared by: Rafael Flaherty              GOALS:    Patient stated goal: to get mobility back  [x] Progressing: [] Met: [] Not Met: [] Adjusted     Therapist goals for Patient:   Short Term Goals: To be achieved in: 2 weeks    2/2 MET    1. Independent in HEP and progression per patient tolerance, in order to prevent re-injury. [] Progressing: [x] Met: [] Not Met: [] Adjusted  2. Patient will have a decrease in pain to facilitate improvement in movement, function, and ADLs as indicated by Functional Deficits. [] Progressing: [x] Met: [] Not Met: [] Adjusted     Long Term Goals: To be achieved in: 12 weeks     6/6 MET     1. FOTO score to be equal or greater to the predicted score (65/100)  to assist with reaching prior level of function. [] Progressing: [x] Met: [] Not Met: [] Adjusted    2.  Patient will demonstrate increased AROM to equal the opposite side bilaterally to allow for reaching into upper kitchen cabinets as indicated by patients Functional Deficits. [] Progressing: [x] Met: [] Not Met: [] Adjusted       3. Patient will demonstrate an increase in strength to 4+/5 to allow for lifting paint buckets less than 5 lbs  as indicated by patients Functional Deficits. [] Progressing: [x] Met: [] Not Met: [] Adjusted       4. Patient will return to all transfers, work activities, and functional activities without increased symptoms or restriction, specifically stripping paint off siding   [] Progressing: [x] Met: [] Not Met: [] Adjusted       5. (Pt specific functional or activity goal) Pt will be able to sleep w/o sleep aids or waking from shoulder pain. [] Progressing: [x] Met: [] Not Met: [] Adjusted  6. Pt will demonstrate pain decreased by 50%  (0-1/10) with all ADLS. [] Progressing: [x] Met: [] Not Met: [] Adjusted          ASSESSMENT:     Pt reports he is careful with using his arm for more physical activities. All goals met. Pt was given my contact info shoulder questions arise. Overall Progression Towards Functional goals/ Treatment Progress Update:  [x] Patient is progressing as expected towards functional goals listed. [] Progression is slowed due to complexities/Impairments listed. [] Progression has been slowed due to co-morbidities.   [] Plan just implemented, too soon to assess goals progression <30days   [] Goals require adjustment due to lack of progress  [] Patient is not progressing as expected and requires additional follow up with physician  [x] Other All goals met    Prognosis for POC: [x] Good [] Fair  [] Poor      Patient requires continued skilled intervention: [x] Yes  [] No    Treatment/Activity Tolerance:  [x] Patient able to complete treatment  [] Patient limited by fatigue  [] Patient limited by pain    [] Patient limited by other medical complications  [] Other:     PLAN: DC to HEP     [] Continue per plan of care [] Alter current plan (see comments above)  [] Plan of care initiated [] Hold pending MD visit [x] Discharge      Electronically signed by:  James Garcia, PT, [id number]     Note: If patient does not return for scheduled/ recommended follow up visits, this note will serve as a discharge from care along with most recent update on progress.

## 2023-05-11 ENCOUNTER — OFFICE VISIT (OUTPATIENT)
Dept: ORTHOPEDIC SURGERY | Age: 68
End: 2023-05-11
Payer: MEDICARE

## 2023-05-11 VITALS — HEIGHT: 69 IN | BODY MASS INDEX: 23.85 KG/M2 | WEIGHT: 161 LBS

## 2023-05-11 DIAGNOSIS — M19.012 OSTEOARTHRITIS OF LEFT AC (ACROMIOCLAVICULAR) JOINT: Primary | ICD-10-CM

## 2023-05-11 DIAGNOSIS — M75.82 ROTATOR CUFF TENDONITIS, LEFT: ICD-10-CM

## 2023-05-11 DIAGNOSIS — M25.512 LEFT SHOULDER PAIN, UNSPECIFIED CHRONICITY: ICD-10-CM

## 2023-05-11 PROCEDURE — 1123F ACP DISCUSS/DSCN MKR DOCD: CPT | Performed by: ORTHOPAEDIC SURGERY

## 2023-05-11 PROCEDURE — 99213 OFFICE O/P EST LOW 20 MIN: CPT | Performed by: ORTHOPAEDIC SURGERY

## 2023-05-11 RX ORDER — MELOXICAM 15 MG/1
15 TABLET ORAL DAILY PRN
Qty: 30 TABLET | Refills: 0 | Status: SHIPPED | OUTPATIENT
Start: 2023-05-11

## 2023-05-11 NOTE — PROGRESS NOTES
understanding and agreement with the above listed treatment plan  -Follow up in 4 weeks time for consideration of the  -Thank you for the clinical consultation and allowing me to participate in the patient's care. Electronically signed by Angella Cavazos MD on 5/11/23 at 3:18 PM EDT         Angella Cavazos MD       Orthopaedic Surgery-Sports Medicine    Disclaimer: This note was dictated with voice recognition software. Though review and correction are routinely performed, please contact the office/medical records for any errors requiring correction.

## 2023-07-06 ENCOUNTER — OFFICE VISIT (OUTPATIENT)
Dept: ORTHOPEDIC SURGERY | Age: 68
End: 2023-07-06

## 2023-07-06 VITALS — HEIGHT: 69 IN | BODY MASS INDEX: 23.7 KG/M2 | WEIGHT: 160 LBS

## 2023-07-06 DIAGNOSIS — Z98.890 S/P SHOULDER SURGERY: Primary | ICD-10-CM

## 2023-07-06 NOTE — PROGRESS NOTES
POST OPERATIVE ORTHOPAEDIC NOTE    DOS: 10/26/2022  PROCEDURES: Right shoulder arthroscopic rotator cuff repair, subacromial decompression and open subpectoral biceps tenodesis    The patient has been recovering. The patient states the pain is 0/10 pain. The patient has continued a home exercise program.  He denies pain as he had preoperatively. Focused pertinent physical examination of the operative extremity:  Wounds C/D/I, well healed surgical incisions  No significant scar tissue  180 degrees forward flexion abduction, 50 degrees external rotation, internal rotation to T12  Cosmesis on biceps firing  Skin intact throughout  5/5 D B T G IO EPL  SILT Ax, R, U, M  +2 radial pulse     Diagnosis Orders   1. S/P shoulder surgery          Assessment and plan: The patient is now 9 months status post the above listed procedure and is recovering    . --Greater than 50% of the time (11/20 minutes) was spent coordinating care and discussing postoperative recovery course  -I had a pleasant discussion with the patient today. He is done quite well with his overall functional recovery and he is pleased with the results of the procedures performed thus far as am I. He is returned to full ADLs and has been using his shoulder as he wishes  -He has been doing a home exercise program that he continues to say consistent with. He may continue to do so  -OTC Tylenol per bottle as needed discomfort  -At to be modification as symptoms require and returning to therapy HEP as needed  -All questions answered to the patient's satisfaction and the patient expressed understanding and agreement with the above listed treatment plan  -Follow up in as needed  -Thank you for the clinical consultation and allowing me to participate in the patient's care. Electronically signed by Braydon Page MD on 7/6/23 at 11:45 AM JAIR Page MD       Orthopaedic Surgery-Sports Medicine      Disclaimer:   This note was

## 2023-12-11 DIAGNOSIS — N40.0 BENIGN PROSTATIC HYPERPLASIA WITHOUT LOWER URINARY TRACT SYMPTOMS: ICD-10-CM

## 2023-12-11 DIAGNOSIS — R73.09 ABNORMAL GLUCOSE: ICD-10-CM

## 2023-12-11 DIAGNOSIS — E78.00 PURE HYPERCHOLESTEROLEMIA: ICD-10-CM

## 2023-12-12 LAB
ALBUMIN SERPL-MCNC: 4.7 G/DL (ref 3.4–5)
ALBUMIN/GLOB SERPL: 2.2 {RATIO} (ref 1.1–2.2)
ALP SERPL-CCNC: 115 U/L (ref 40–129)
ALT SERPL-CCNC: 16 U/L (ref 10–40)
ANION GAP SERPL CALCULATED.3IONS-SCNC: 11 MMOL/L (ref 3–16)
AST SERPL-CCNC: 24 U/L (ref 15–37)
BILIRUB SERPL-MCNC: 0.5 MG/DL (ref 0–1)
BUN SERPL-MCNC: 23 MG/DL (ref 7–20)
CALCIUM SERPL-MCNC: 9.6 MG/DL (ref 8.3–10.6)
CHLORIDE SERPL-SCNC: 103 MMOL/L (ref 99–110)
CHOLEST SERPL-MCNC: 171 MG/DL (ref 0–199)
CO2 SERPL-SCNC: 28 MMOL/L (ref 21–32)
CREAT SERPL-MCNC: 1.4 MG/DL (ref 0.8–1.3)
EST. AVERAGE GLUCOSE BLD GHB EST-MCNC: 114 MG/DL
GFR SERPLBLD CREATININE-BSD FMLA CKD-EPI: 55 ML/MIN/{1.73_M2}
GLUCOSE SERPL-MCNC: 94 MG/DL (ref 70–99)
HBA1C MFR BLD: 5.6 %
HDLC SERPL-MCNC: 51 MG/DL (ref 40–60)
LDLC SERPL CALC-MCNC: 96 MG/DL
POTASSIUM SERPL-SCNC: 4.8 MMOL/L (ref 3.5–5.1)
PROT SERPL-MCNC: 6.8 G/DL (ref 6.4–8.2)
PSA SERPL DL<=0.01 NG/ML-MCNC: 2.95 NG/ML (ref 0–4)
SODIUM SERPL-SCNC: 142 MMOL/L (ref 136–145)
TRIGL SERPL-MCNC: 121 MG/DL (ref 0–150)
VLDLC SERPL CALC-MCNC: 24 MG/DL

## 2024-02-16 PROBLEM — Z01.818 PRE-OP EVALUATION: Status: RESOLVED | Noted: 2022-10-14 | Resolved: 2024-02-16

## 2024-03-06 PROBLEM — R30.0 DYSURIA: Status: ACTIVE | Noted: 2024-03-06

## 2024-03-26 ENCOUNTER — TELEPHONE (OUTPATIENT)
Dept: PHARMACY | Facility: CLINIC | Age: 69
End: 2024-03-26

## 2024-03-26 NOTE — TELEPHONE ENCOUNTER
[facility] CLINICAL PHARMACY: ADHERENCE REVIEW  Identified care gap per [employer]: fills at [facility]: Statin adherence    ASSESSMENT  STATIN ADHERENCE    Insurance Records claims through 3/9/2024 (Prior Year PDC = PASSED; YTD PDC = 16%; Potential Fail Date: 3/30/24):   Atorvastatin 40mg last filled on 3/1/24 for 9 day supply. Next refill due: 3/10/24    Prescribed si tablet/capsule daily    Per Reconcile Dispense History: last filled on 3/1/24 for 9 day supply.  Also filled a 4 day suppl yin January.  Unclear what is going on per chart review    Per [facility]: Confirmed refill history above. will get 90 day supply ready to  since past due.    Lab Results   Component Value Date    CHOL 171 2023    TRIG 121 2023    HDL 51 2023    LDLCALC 96 2023     ALT   Date Value Ref Range Status   2023 16 10 - 40 U/L Final     AST   Date Value Ref Range Status   2023 24 15 - 37 U/L Final     The 10-year ASCVD risk score (Spencer WHEAT, et al., 2019) is: 13.2%    Values used to calculate the score:      Age: 68 years      Sex: Male      Is Non- : No      Diabetic: No      Tobacco smoker: No      Systolic Blood Pressure: 128 mmHg      Is BP treated: No      HDL Cholesterol: 51 mg/dL      Total Cholesterol: 171 mg/dL     PLAN  Per insurer report, LIS-0 - co-pays are based on tiers and patient is subject to coverage gap.    The following are interventions that have been identified:   Patient OVERDUE refilling Atorvastatin 40mg and active on home medication list.   [facility] has a 90ds refilled billed through [facility]    Attempting to reach patient to review.  Left message asking for return call.    Recent Visits  Date Type Provider Dept   24 Office Visit Bob Lopez MD [facility]   24 Office Visit Bob Lopez MD [facility]   23 Office Visit Bob Lopez MD [facility]   23 Office Visit Bob Lopez

## 2024-04-10 NOTE — TELEPHONE ENCOUNTER
For Pharmacy Admin Tracking Only    Program: [facility]  CPA in place:  No  Recommendation Provided To: Patient/Caregiver: 1 via Telephone and Pharmacy: 1  Intervention Detail: Adherence Monitorin and Refill(s) Provided  Intervention Accepted By: Patient/Caregiver: 0 and Pharmacy: 1  Gap Closed?: Yes   Time Spent (min): 15

## 2024-04-26 NOTE — FLOWSHEET NOTE
Subjective:     Patient ID: Ej Miller is a 87 y.o. male.    Chief Complaint: PAD  (Brooklyn Tomlinson MD at 4/17/2024  ), Nail Problem (TOE PAIN ), and Nail Care    Ej is a 87 y.o. male who presents to the clinic for evaluation and treatment of high risk feet. Ej has a past medical history of Cataract, Gastric ulcer; benign, Gout, joint, Hyperglycemia, Nuclear sclerosis of both eyes (9/21/2015), Osteoarthritis of knee, Peptic ulcer, Primary osteoarthritis of both knees (2/17/2017), and Status post total right knee replacement 2/17/2017 (3/3/2017). The patient's chief complaint is long, thick toenails. Difficult for him to trim. R 4th/5th toe bothering him with pain. No new concerns. This patient has documented high risk feet requiring routine maintenance secondary to peripheral vascular disease.    PCP: Brooklyn Tomlinson MD    Date Last Seen by PCP: 4/17/2024     Current shoe gear:   Casual shoes          Last encounter in this department: Visit date not found    Hemoglobin A1C   Date Value Ref Range Status   04/17/2024 5.5 4.0 - 5.6 % Final     Comment:     ADA Screening Guidelines:  5.7-6.4%  Consistent with prediabetes  >or=6.5%  Consistent with diabetes    High levels of fetal hemoglobin interfere with the HbA1C  assay. Heterozygous hemoglobin variants (HbS, HgC, etc)do  not significantly interfere with this assay.   However, presence of multiple variants may affect accuracy.     01/11/2017 5.6 4.5 - 6.2 % Final     Comment:     According to ADA guidelines, hemoglobin A1C <7.0% represents  optimal control in non-pregnant diabetic patients.  Different  metrics may apply to specific populations.   Standards of Medical Care in Diabetes - 2016.  For the purpose of screening for the presence of diabetes:  <5.7%     Consistent with the absence of diabetes  5.7-6.4%  Consistent with increasing risk for diabetes   (prediabetes)  >or=6.5%  Consistent with diabetes  Currently no consensus exists  [facility],  [address]        Physical Therapy  Cancellation/No-show Note  Patient Name:  Nika Contreras  :  1955   Date:  2023  Cancelled visits to date: 0  No-shows to date: 0    For today's appointment patient:  []  Cancelled  []  Rescheduled appointment  [x]  No-show     Reason given by patient:  []  Patient ill  []  Conflicting appointment  []  No transportation    []  Conflict with work  []  No reaswrong time    Comments:      Electronically signed by:  Terrence Ruano PT for use of hemoglobin A1C  for diagnosis of diabetes for children.     09/21/2015 5.7 4.5 - 6.2 % Final       Review of Systems   Constitutional: Negative for chills and fever.   Cardiovascular:  Negative for chest pain, claudication and leg swelling.   Respiratory:  Negative for cough and shortness of breath.    Skin:  Positive for dry skin and nail changes.   Musculoskeletal: Negative.    Gastrointestinal:  Negative for nausea and vomiting.   Neurological:  Negative for numbness and paresthesias.   Psychiatric/Behavioral:  Negative for altered mental status.         Objective:     Physical Exam  Vitals reviewed.   Constitutional:       Appearance: He is well-developed.   HENT:      Head: Normocephalic.   Cardiovascular:      Pulses:           Dorsalis pedis pulses are 1+ on the right side and 1+ on the left side.      Comments: PT pulses diminished b/l. CRT < 3 sec to tips of toes.    Pulmonary:      Effort: No respiratory distress.   Musculoskeletal:      Comments: Pain with palpation of toenails 1-5 b/l. Pain with palpation of R 4th/5th toes with no obvious or major deformity noted. Otherwise rectus foot and toe position b/l foot with no major deformities noted. Mild equinus noted b/l ankle with < 10 deg DF noted. MMT 5/5 in DF/PF/Inv/Ev resistance with no reproduction of pain in any direction b/l foot. Passive range of motion of ankle and pedal joints is painless b/l foot/ankle/toes.     Skin:     General: Skin is warm and dry.      Findings: No erythema.      Comments: No open lesions, lacerations or wounds noted. Nails are thickened, elongated, discolored yellow/brown with subungual debris and brittleness to R 1-5 and L 1-5. Interdigital spaces clean, dry and intact b/l. No erythema noted to b/l foot. Skin texture thin, atrophic, dry. Pedal hair diminished b/l. Toes b/l cool to touch. Hyperkeratotic lesion noted to distal tips of toes 2-4 b/l. Skin lines present to lesion/s. No signs of deep tissue injury.       Neurological:      Mental Status: He is alert and oriented to person, place, and time.      Sensory: No sensory deficit.      Comments: Light touch, proprioception, and sharp/dull sensation are all intact bilaterally.    Psychiatric:         Behavior: Behavior normal.         Thought Content: Thought content normal.         Judgment: Judgment normal.           Assessment:      Encounter Diagnoses   Name Primary?    PAD (peripheral artery disease) Yes    Onychodystrophy     Corns/callosities     Toe pain, right     Pain due to onychomycosis of toenails of both feet      Plan:     Ej was seen today for pad , nail problem and nail care.    Diagnoses and all orders for this visit:    PAD (peripheral artery disease)    Onychodystrophy    Corns/callosities    Toe pain, right    Pain due to onychomycosis of toenails of both feet    Other orders  -     diclofenac sodium (VOLTAREN) 1 % Gel; Apply 2 g topically 3 (three) times daily. Apply to the area of pain 2-3x per day or night as needed      I counseled the patient on his conditions, their implications and medical management.        - Shoe inspection. Patient instructed on proper foot hygeine. We discussed wearing proper shoe gear, daily foot inspections, never walking without protective shoe gear, never putting sharp instruments to feet, routine podiatric nail visits every 2-3 months.      - With patient's permission, nails were aggressively reduced and debrided x 10 to their soft tissue attachment mechanically and with electric , removing all offending nail and debris. Patient relates relief following the procedure. He will continue to monitor the areas daily, inspect his feet, wear protective shoe gear when ambulatory, moisturizer to maintain skin integrity and follow in this office in approximately 2-3 months, sooner p.r.n.    The affected area was cleansed with an alcohol prep pad. Next, utilizing a 5mm curette, the hyperkeratotic tissues were trimmed  from distal tips of toes 2-4 b/l, down to appropriate level of skin. Care was taken to remove any nucleated core from the center of the lesion. No pinpoint bleeding was encountered. The patient tolerated relief following this procedure.     Discussed regular and routine moisturizer to skin of both feet to help improve dry skin. Advised to apply twice daily until resolution of symptoms. Avoid between toes.     For R 4th/5th toe pain: Rx Voltaren gel to be applied to affected area up to 3-4 x daily as needed for pain.     RTC 3 months, sooner PRN

## 2024-05-29 PROBLEM — Z01.818 PRE-OP EVALUATION: Status: RESOLVED | Noted: 2022-10-14 | Resolved: 2024-05-29

## 2024-06-17 ENCOUNTER — TELEPHONE (OUTPATIENT)
Dept: PHARMACY | Facility: CLINIC | Age: 69
End: 2024-06-17

## 2024-06-17 NOTE — TELEPHONE ENCOUNTER
[facility] CLINICAL PHARMACY: ADHERENCE REVIEW  Identified care gap per [employer]: fills at [facility]: Statin adherence    ASSESSMENT  STATIN ADHERENCE    Insurance Records claims through 2024 (Prior Year PDC = not reported; YTD PDC = 55%; Potential Fail Date: 24):   Atorvastatin 40mg last filled on 3/26/24 for 90 day supply. Next refill due: 24    Prescribed si tablet/capsule daily    Early in , pt only filled a quantity of 9 tablets for some unknown reason    Lab Results   Component Value Date    CHOL 160 2024    TRIG 104 2024    HDL 56 2024     Lab Results   Component Value Date    LDL 83 2024      ALT   Date Value Ref Range Status   2024 24 10 - 40 U/L Final     AST   Date Value Ref Range Status   2024 32 15 - 37 U/L Final     The 10-year ASCVD risk score (Spencer WHEAT, et al., 2019) is: 12.1%    Values used to calculate the score:      Age: 68 years      Sex: Male      Is Non- : No      Diabetic: No      Tobacco smoker: No      Systolic Blood Pressure: 128 mmHg      Is BP treated: No      HDL Cholesterol: 56 mg/dL      Total Cholesterol: 160 mg/dL     PLAN  The following are interventions that have been identified:   Patient will need FUTURE REFILLS for Atorvastatin 40mg.  Due on .  PT has PV on 24, check in aftr     Recent Visits  Date Type Provider Dept   24 Office Visit Bob Lopez MD [facility]   24 Office Visit Bob Lopez MD [facility]   24 Office Visit Bob Lopez MD [facility]   23 Office Visit Bob Lopez MD [facility]   23 Office Visit Bob Lopez MD [facility]   23 Office Visit Judit Rasheed APRN - CNP [facility]   23 Office Visit Bob Lopez MD [facility]   Showing recent visits within past 540 days with a meds authorizing provider and meeting all other requirements  Future

## 2024-06-25 NOTE — TELEPHONE ENCOUNTER
Per Jose, Patient on in the \"Save a Trip\" program and that's why he last filled a 9ds.     There are only 77 pills remaining at this time.    A 77ds of Atorvastatin 40mg will be filled and ready for  later today with a $0.00 copay. Billed through [facility].    Routing back to pharmacist

## 2024-06-27 NOTE — TELEPHONE ENCOUNTER
Will followup in a couple months.    BRUCE Smith, PharmD, BCACP  Ambulatory Care Clinical Pharmacist- [facility] Clinical Pharmacy  Department, toll free: [phone]    For Pharmacy Admin Tracking Only    Program: [facility] [place]  CPA in place:  No  Recommendation Provided To: Pharmacy: 1  Intervention Detail: Refill(s) Provided  Intervention Accepted By: Pharmacy: 1  Gap Closed?: Yes   Time Spent (min): 15

## 2025-02-20 ENCOUNTER — TELEPHONE (OUTPATIENT)
Dept: FAMILY MEDICINE CLINIC | Age: 70
End: 2025-02-20

## 2025-02-20 NOTE — TELEPHONE ENCOUNTER
----- Message from Steven BECERRA sent at 2/19/2025  1:53 PM EST -----  Regarding: [facility] Appointment Request  E[facility] Appointment Request    Patient needs appointment for E[facility] Appointment Type: New to Provider.    Patient Requested Dates(s): Any available day   Patient Requested Time: Any available time   Provider Name:Holden Moon MD    Reason for Appointment Request: Established Patient - No appointments available during search/ Patient would like to book an appointment for New to provider establish care.  --------------------------------------------------------------------------------------------------------------------------    Relationship to Patient: Self     Call Back Information: OK to leave message on voicemail  Preferred Call Back Number: [phone]

## 2025-04-12 ENCOUNTER — APPOINTMENT (OUTPATIENT)
Dept: GENERAL RADIOLOGY | Age: 70
End: 2025-04-12
Payer: MEDICARE

## 2025-04-12 ENCOUNTER — HOSPITAL ENCOUNTER (EMERGENCY)
Age: 70
Discharge: HOME | End: 2025-04-12
Attending: EMERGENCY MEDICINE
Payer: MEDICARE

## 2025-04-12 VITALS
SYSTOLIC BLOOD PRESSURE: 122 MMHG | TEMPERATURE: 98.1 F | OXYGEN SATURATION: 100 % | BODY MASS INDEX: 23.46 KG/M2 | HEIGHT: 70 IN | DIASTOLIC BLOOD PRESSURE: 89 MMHG | WEIGHT: 163.9 LBS | RESPIRATION RATE: 14 BRPM | HEART RATE: 87 BPM

## 2025-04-12 DIAGNOSIS — S61.212A LACERATION OF RIGHT MIDDLE FINGER WITHOUT FOREIGN BODY WITHOUT DAMAGE TO NAIL, INITIAL ENCOUNTER: ICD-10-CM

## 2025-04-12 DIAGNOSIS — S61.451A DOG BITE, HAND, RIGHT, INITIAL ENCOUNTER: ICD-10-CM

## 2025-04-12 DIAGNOSIS — S61.452A DOG BITE, HAND, LEFT, INITIAL ENCOUNTER: Primary | ICD-10-CM
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
## 2025-04-12 PROCEDURE — 6370000000 HC RX 637 (ALT 250 FOR IP): Performed by: EMERGENCY MEDICINE

## 2025-04-12 PROCEDURE — 73130 X-RAY EXAM OF HAND: CPT

## 2025-04-12 PROCEDURE — 99283 EMERGENCY DEPT VISIT LOW MDM: CPT

## 2025-04-12 RX ORDER — LIDOCAINE HYDROCHLORIDE 10 MG/ML
30 INJECTION, SOLUTION INFILTRATION; PERINEURAL ONCE
Status: DISCONTINUED | OUTPATIENT
Start: 2025-04-12 | End: 2025-04-12 | Stop reason: HOSPADM

## 2025-04-12 RX ORDER — HYDROCODONE BITARTRATE AND ACETAMINOPHEN 5; 325 MG/1; MG/1
1 TABLET ORAL ONCE
Status: COMPLETED | OUTPATIENT
Start: 2025-04-12 | End: 2025-04-12

## 2025-04-12 RX ORDER — HYDROCODONE BITARTRATE AND ACETAMINOPHEN 5; 325 MG/1; MG/1
1 TABLET ORAL EVERY 6 HOURS PRN
Qty: 10 TABLET | Refills: 0 | Status: SHIPPED | OUTPATIENT
Start: 2025-04-12 | End: 2025-04-15

## 2025-04-12 RX ADMIN — AMOXICILLIN AND CLAVULANATE POTASSIUM 1 TABLET: 875; 125 TABLET, FILM COATED ORAL at 12:23

## 2025-04-12 RX ADMIN — HYDROCODONE BITARTRATE AND ACETAMINOPHEN 1 TABLET: 5; 325 TABLET ORAL at 12:23

## 2025-04-12 ASSESSMENT — PAIN SCALES - GENERAL: PAINLEVEL_OUTOF10: 7

## 2025-04-12 ASSESSMENT — PAIN DESCRIPTION - LOCATION: LOCATION: HAND

## 2025-04-12 ASSESSMENT — PAIN DESCRIPTION - ORIENTATION: ORIENTATION: RIGHT;LEFT

## 2025-04-12 NOTE — ED PROVIDER NOTES
[facility] EMERGENCY DEPARTMENT PROVIDER NOTE    Patient Identification  Pt Name: Real Lucas  MRN: [id number]  Birthdate 1955  Date of evaluation: 4/12/2025  Provider: Jorge Balderrama MD  PCP: Real Lopez MD    HPI  Real Lucas is a 69 y.o. male who presents to the ED for bilateral dog bite wounds.  Patient was walking his dog when another dog from house came outside and the 2 dogs began to fight.  He attempted to break them up, take dog bite wounds to both hands.  His most significant wound is localized over the finger pad of his right third digit.  It is deep with loss of tissue.  He also has a linear penetrating wound to the webspace between the 2nd and 3rd digits on the right hand as well as multiple superficial skin tears and abrasions to the left hand.    He has no other injuries..     No other complaints, modifying factors or associated symptoms.     Nursing notes reviewed.   Allergies: Patient has no known allergies.  Past medical history:   Past Medical History:   Diagnosis Date    Arthritis     Cervical disc disease 01/01/2010    Headache(784.0)     Hyperlipemia     Low back pain      Past surgical history:   Past Surgical History:   Procedure Laterality Date    ACHILLES TENDON SURGERY      APPENDECTOMY      COLONOSCOPY  06/30/2017    polyp,  diverticulosis    dr real caceres    5 years    COLONOSCOPY W/ BIOPSIES  10/02/2023    dr [email or web address]--[email or web address] 2028    SHOULDER ARTHROSCOPY Right 10/26/2022    VIDEO ARTHROSCOPY RIGHT SHOULDER, REVISION  ROTATOR CUFF REPAIR, SUBACROMIAL DECOMPRESSION AND SUBPECTORAL BICEPS TENODESIS performed by Colton Goff MD at [facility] OR    SHOULDER SURGERY  right shoulder     Home medications:   Previous Medications    ATORVASTATIN (LIPITOR) 40 MG TABLET    TAKE 1 TABLET BY MOUTH DAILY    CLOBETASOL (TEMOVATE) 0.05 % OINTMENT    Apply to affected area BID PRN flares    CYCLOBENZAPRINE (FLEXERIL) 10 MG TABLET    TAKE 1 TABLET BY MOUTH EVERY EVENING AS  size:  5-0    Suture material:  Nylon    Suture technique:  Simple interrupted    Number of sutures:  3  Approximation:     Approximation:  Loose  Repair type:     Repair type:  Simple  Post-procedure details:     Dressing:  Non-adherent dressing    Procedure completion:  Tolerated well, no immediate complications        RADIOLOGY  XR HAND RIGHT (MIN 3 VIEWS)   Final Result      Soft tissue edema and laceration of the middle finger. No acute osseous abnormality or radiopaque foreign body.      Electronically signed by Dragan Jordan MD      XR HAND LEFT (MIN 3 VIEWS)   Final Result      Soft tissue edema and gas in the fourth webspace. No acute osseous abnormality or radiopaque foreign body.      Electronically signed by Dragan Jordan MD           MEDICATIONS GIVEN IN ED  Medications   lidocaine 1 % injection 30 mL (has no administration in time range)   amoxicillin-clavulanate (AUGMENTIN) 875-125 MG per tablet 1 tablet (1 tablet Oral Given 4/12/25 1223)   HYDROcodone-acetaminophen (NORCO) 5-325 MG per tablet 1 tablet (1 tablet Oral Given 4/12/25 1223)       ED COURSE/MDM  Patient seen and evaluated.  Given the nature of the wound, it would have increased risk of infection, so we initiated empiric anabiotic treatment with Augmentin in the emergency department.  The patient had significant loss of tissue in the wound to the finger pad of his right third digit with exposure of deeper layers, including the distal flexor tendon.  Given this a partial closure was necessary, although I only used 3 widely spaced sutures with loosely approximated wound margins to allow continued drainage and proper healing.  Patient last had a tetanus shot in 2023, so he did not need an update today. I discussed findings, plan, and follow-up with patient.  Patient can be safely discharged to home.  Reasons to RT ED discussed. Patient expresses understanding and is in agreement with plan.     Patient Referrals:  [facility]

## 2025-04-20 ENCOUNTER — HOSPITAL ENCOUNTER (EMERGENCY)
Age: 70
Discharge: HOME | End: 2025-04-20

## 2025-04-20 PROCEDURE — 99282 EMERGENCY DEPT VISIT SF MDM: CPT

## (undated) DEVICE — SOLUTION IRRIG 5L LAC R BG

## (undated) DEVICE — PAD,ABDOMINAL,8"X10",ST,LF: Brand: MEDLINE

## (undated) DEVICE — TUBE IRRIG L8IN LNG PT W/ CONN FOR PMP SYS REDEUCE

## (undated) DEVICE — DYONICS 4.0 MM ELITE                                    ACROMIOBLASTER STRAIGHT DISPOSABLE                                    BURRS, SAGE GREEN, 10000 MAXIMUM                                    RPM, PACKAGED 6 PER BOX, STERILE

## (undated) DEVICE — SHOULDER STABILIZATION KIT,                                    DISPOSABLE 12 PER BOX

## (undated) DEVICE — GLOVE SURG SZ 8 L12IN FNGR THK79MIL GRN LTX FREE

## (undated) DEVICE — PENCIL ES CRD L10FT HND SWCHING ROCK SWCH W/ EDGE COAT BLDE

## (undated) DEVICE — SOLUTION IV IRRIG 500ML 0.9% SODIUM CHL 2F7123

## (undated) DEVICE — 3M™ STERI-DRAPE™ U-DRAPE, LONG 1019: Brand: STERI-DRAPE™

## (undated) DEVICE — COVER,MAYO STAND,STERILE: Brand: MEDLINE

## (undated) DEVICE — STERILE POLYISOPRENE POWDER-FREE SURGICAL GLOVES: Brand: PROTEXIS

## (undated) DEVICE — SYRINGE MED 10ML LUERLOCK TIP W/O SFTY DISP

## (undated) DEVICE — STANDARD HYPODERMIC NEEDLE,POLYPROPYLENE HUB: Brand: MONOJECT

## (undated) DEVICE — WEREWOLF FLOW 90 COBLATION WAND: Brand: COBLATION

## (undated) DEVICE — GLOVE SURG SZ 8 L12IN FNGR THK94MIL STD WHT LTX FREE

## (undated) DEVICE — TUBING PMP L8FT LNG W/ CONN FOR AR-6400 REDEUCE

## (undated) DEVICE — 40763 BEACH CHAIR HEAD RESTRAINT: Brand: 40763 BEACH CHAIR HEAD RESTRAINT

## (undated) DEVICE — SUTURE NONABSORBABLE MONOFILAMENT 3-0 PS-1 18 IN BLK ETHILON 1663H

## (undated) DEVICE — ELECTRODE PT RET AD L9FT HI MOIST COND ADH HYDRGEL CORDED

## (undated) DEVICE — APPLICATOR MEDICATED 10.5 CC SOLUTION HI LT ORNG CHLORAPREP

## (undated) DEVICE — SUTURE MCRYL SZ 3-0 L27IN ABSRB UD PS-2 3/8 CIR REV CUT NDL MCP427H

## (undated) DEVICE — INTENDED FOR TISSUE SEPARATION, AND OTHER PROCEDURES THAT REQUIRE A SHARP SURGICAL BLADE TO PUNCTURE OR CUT.: Brand: BARD-PARKER ® STAINLESS STEEL BLADES

## (undated) DEVICE — SUTURE PDS + SZ 0 L27IN ABSRB VLT L26MM CT 2 1 2 CIR PDP334H

## (undated) DEVICE — GOWN,SIRUS,POLYRNF,BRTHSLV,XLN/XL,20/CS: Brand: MEDLINE

## (undated) DEVICE — SYRINGE MED 50ML LUERLOCK TIP

## (undated) DEVICE — TOWEL,OR,DSP,ST,BLUE,STD,8/PK,10PK/CS: Brand: MEDLINE

## (undated) DEVICE — SUTURE ETHLN SZ 3-0 L18IN NONABSORBABLE BLK L24MM PS-1 3/8 1663G

## (undated) DEVICE — 1810 FOAM BLOCK NEEDLE COUNTER: Brand: DEVON

## (undated) DEVICE — GAUZE,SPONGE,4"X4",16PLY,STRL,LF,10/TRAY: Brand: MEDLINE

## (undated) DEVICE — SKIN MARKER,REGULAR TIP WITH RULER AND LABELS: Brand: DEVON

## (undated) DEVICE — DRAPE,U/ SHT,SPLIT,PLAS,STERIL: Brand: MEDLINE

## (undated) DEVICE — SWITCH DRAPE TENET 7633

## (undated) DEVICE — 4.5 MM FULL RADIUS STRAIGHT                                    BLADES, POWER/EP-1, YELLOW, PACKAGED                                    6 PER BOX, STERILE: Brand: DYONICS

## (undated) DEVICE — DRAPE BEACH CHAIR SHOULDER W/ POUCH 172X100 IN

## (undated) DEVICE — PACK PROCEDURE SURG ARTHSCP CUST

## (undated) DEVICE — DRESSING,GAUZE,XEROFORM,CURAD,1"X8",ST: Brand: CURAD

## (undated) DEVICE — CANNULA ARTHSCP L3CM ID8MM DBL DAM 1 PC MOLD LO PROF FLNG

## (undated) DEVICE — SOLUTION IRRIG 500ML 0.9% SOD CHL USP POUR PLAS BTL

## (undated) DEVICE — Device

## (undated) DEVICE — 3M™ IOBAN™ 2 ANTIMICROBIAL INCISE DRAPE 6650EZ: Brand: IOBAN™ 2